# Patient Record
Sex: MALE | Race: WHITE | NOT HISPANIC OR LATINO | Employment: FULL TIME | URBAN - METROPOLITAN AREA
[De-identification: names, ages, dates, MRNs, and addresses within clinical notes are randomized per-mention and may not be internally consistent; named-entity substitution may affect disease eponyms.]

---

## 2019-05-29 ENCOUNTER — HOSPITAL ENCOUNTER (OUTPATIENT)
Facility: CLINIC | Age: 39
End: 2019-05-29
Attending: ORTHOPAEDIC SURGERY | Admitting: ORTHOPAEDIC SURGERY
Payer: COMMERCIAL

## 2019-07-09 RX ORDER — INSULIN GLARGINE 100 [IU]/ML
45-50 INJECTION, SOLUTION SUBCUTANEOUS DAILY
COMMUNITY
End: 2020-07-07

## 2019-07-09 RX ORDER — METFORMIN HCL 500 MG
2000 TABLET, EXTENDED RELEASE 24 HR ORAL AT BEDTIME
COMMUNITY

## 2019-07-09 RX ORDER — LISINOPRIL 20 MG/1
20 TABLET ORAL DAILY
COMMUNITY
End: 2021-03-20

## 2019-07-09 RX ORDER — CHLORAL HYDRATE 500 MG
2 CAPSULE ORAL 2 TIMES DAILY
COMMUNITY
End: 2021-03-20

## 2019-07-09 RX ORDER — IBUPROFEN 200 MG
800 TABLET ORAL EVERY 4 HOURS PRN
COMMUNITY
End: 2020-07-07

## 2019-07-09 RX ORDER — NAPROXEN 500 MG/1
500 TABLET ORAL 2 TIMES DAILY WITH MEALS
COMMUNITY
End: 2021-03-20

## 2019-07-11 ENCOUNTER — ANESTHESIA (OUTPATIENT)
Dept: SURGERY | Facility: CLINIC | Age: 39
End: 2019-07-11
Payer: COMMERCIAL

## 2019-07-11 ENCOUNTER — HOSPITAL ENCOUNTER (OUTPATIENT)
Facility: CLINIC | Age: 39
Discharge: HOME | End: 2019-07-11
Attending: ORTHOPAEDIC SURGERY | Admitting: ORTHOPAEDIC SURGERY
Payer: COMMERCIAL

## 2019-07-11 ENCOUNTER — ANESTHESIA EVENT (OUTPATIENT)
Dept: SURGERY | Facility: CLINIC | Age: 39
End: 2019-07-11
Payer: COMMERCIAL

## 2019-07-11 VITALS
HEART RATE: 85 BPM | HEIGHT: 71 IN | RESPIRATION RATE: 16 BRPM | DIASTOLIC BLOOD PRESSURE: 74 MMHG | WEIGHT: 281.1 LBS | BODY MASS INDEX: 39.35 KG/M2 | SYSTOLIC BLOOD PRESSURE: 111 MMHG | TEMPERATURE: 97 F | OXYGEN SATURATION: 92 %

## 2019-07-11 DIAGNOSIS — Z98.890 POSTOPERATIVE STATE: Primary | ICD-10-CM

## 2019-07-11 LAB
GLUCOSE BLDC GLUCOMTR-MCNC: 138 MG/DL (ref 70–99)
GLUCOSE BLDC GLUCOMTR-MCNC: 159 MG/DL (ref 70–99)

## 2019-07-11 PROCEDURE — 71000012 ZZH RECOVERY PHASE 1 LEVEL 1 FIRST HR: Performed by: ORTHOPAEDIC SURGERY

## 2019-07-11 PROCEDURE — 25800030 ZZH RX IP 258 OP 636: Performed by: NURSE ANESTHETIST, CERTIFIED REGISTERED

## 2019-07-11 PROCEDURE — 25000125 ZZHC RX 250: Performed by: NURSE ANESTHETIST, CERTIFIED REGISTERED

## 2019-07-11 PROCEDURE — 25000125 ZZHC RX 250: Performed by: ORTHOPAEDIC SURGERY

## 2019-07-11 PROCEDURE — 27210794 ZZH OR GENERAL SUPPLY STERILE: Performed by: ORTHOPAEDIC SURGERY

## 2019-07-11 PROCEDURE — 25000128 H RX IP 250 OP 636: Performed by: NURSE ANESTHETIST, CERTIFIED REGISTERED

## 2019-07-11 PROCEDURE — 25000128 H RX IP 250 OP 636: Performed by: ANESTHESIOLOGY

## 2019-07-11 PROCEDURE — 82962 GLUCOSE BLOOD TEST: CPT

## 2019-07-11 PROCEDURE — 36000056 ZZH SURGERY LEVEL 3 1ST 30 MIN: Performed by: ORTHOPAEDIC SURGERY

## 2019-07-11 PROCEDURE — 25000128 H RX IP 250 OP 636: Performed by: PHYSICIAN ASSISTANT

## 2019-07-11 PROCEDURE — 25800030 ZZH RX IP 258 OP 636: Performed by: ANESTHESIOLOGY

## 2019-07-11 PROCEDURE — 36000058 ZZH SURGERY LEVEL 3 EA 15 ADDTL MIN: Performed by: ORTHOPAEDIC SURGERY

## 2019-07-11 PROCEDURE — 37000008 ZZH ANESTHESIA TECHNICAL FEE, 1ST 30 MIN: Performed by: ORTHOPAEDIC SURGERY

## 2019-07-11 PROCEDURE — 40000170 ZZH STATISTIC PRE-PROCEDURE ASSESSMENT II: Performed by: ORTHOPAEDIC SURGERY

## 2019-07-11 PROCEDURE — C1713 ANCHOR/SCREW BN/BN,TIS/BN: HCPCS | Performed by: ORTHOPAEDIC SURGERY

## 2019-07-11 PROCEDURE — 37000009 ZZH ANESTHESIA TECHNICAL FEE, EACH ADDTL 15 MIN: Performed by: ORTHOPAEDIC SURGERY

## 2019-07-11 DEVICE — IMPLANTABLE DEVICE: Type: IMPLANTABLE DEVICE | Site: ELBOW | Status: FUNCTIONAL

## 2019-07-11 RX ORDER — ONDANSETRON 2 MG/ML
INJECTION INTRAMUSCULAR; INTRAVENOUS PRN
Status: DISCONTINUED | OUTPATIENT
Start: 2019-07-11 | End: 2019-07-11

## 2019-07-11 RX ORDER — HYDROMORPHONE HYDROCHLORIDE 1 MG/ML
.3-.5 INJECTION, SOLUTION INTRAMUSCULAR; INTRAVENOUS; SUBCUTANEOUS EVERY 10 MIN PRN
Status: DISCONTINUED | OUTPATIENT
Start: 2019-07-11 | End: 2019-07-11 | Stop reason: HOSPADM

## 2019-07-11 RX ORDER — ONDANSETRON 2 MG/ML
4 INJECTION INTRAMUSCULAR; INTRAVENOUS EVERY 30 MIN PRN
Status: DISCONTINUED | OUTPATIENT
Start: 2019-07-11 | End: 2019-07-11 | Stop reason: HOSPADM

## 2019-07-11 RX ORDER — FENTANYL CITRATE 50 UG/ML
25-50 INJECTION, SOLUTION INTRAMUSCULAR; INTRAVENOUS
Status: DISCONTINUED | OUTPATIENT
Start: 2019-07-11 | End: 2019-07-11 | Stop reason: HOSPADM

## 2019-07-11 RX ORDER — ROPIVACAINE HYDROCHLORIDE 5 MG/ML
INJECTION, SOLUTION EPIDURAL; INFILTRATION; PERINEURAL PRN
Status: DISCONTINUED | OUTPATIENT
Start: 2019-07-11 | End: 2019-07-11

## 2019-07-11 RX ORDER — PROPOFOL 10 MG/ML
INJECTION, EMULSION INTRAVENOUS PRN
Status: DISCONTINUED | OUTPATIENT
Start: 2019-07-11 | End: 2019-07-11

## 2019-07-11 RX ORDER — LIDOCAINE HYDROCHLORIDE AND EPINEPHRINE 10; 10 MG/ML; UG/ML
INJECTION, SOLUTION INFILTRATION; PERINEURAL PRN
Status: DISCONTINUED | OUTPATIENT
Start: 2019-07-11 | End: 2019-07-11 | Stop reason: HOSPADM

## 2019-07-11 RX ORDER — PROPOFOL 10 MG/ML
INJECTION, EMULSION INTRAVENOUS CONTINUOUS PRN
Status: DISCONTINUED | OUTPATIENT
Start: 2019-07-11 | End: 2019-07-11

## 2019-07-11 RX ORDER — ONDANSETRON 4 MG/1
4 TABLET, ORALLY DISINTEGRATING ORAL EVERY 30 MIN PRN
Status: DISCONTINUED | OUTPATIENT
Start: 2019-07-11 | End: 2019-07-11 | Stop reason: HOSPADM

## 2019-07-11 RX ORDER — FENTANYL CITRATE 50 UG/ML
100 INJECTION, SOLUTION INTRAMUSCULAR; INTRAVENOUS
Status: COMPLETED | OUTPATIENT
Start: 2019-07-11 | End: 2019-07-11

## 2019-07-11 RX ORDER — ACETAMINOPHEN 325 MG/1
650 TABLET ORAL EVERY 4 HOURS PRN
Qty: 50 TABLET | Refills: 0 | Status: SHIPPED | OUTPATIENT
Start: 2019-07-11 | End: 2020-07-07

## 2019-07-11 RX ORDER — ACETAMINOPHEN 325 MG/1
650 TABLET ORAL
Status: DISCONTINUED | OUTPATIENT
Start: 2019-07-11 | End: 2019-07-11 | Stop reason: HOSPADM

## 2019-07-11 RX ORDER — OXYCODONE HYDROCHLORIDE 5 MG/1
5 TABLET ORAL
Status: DISCONTINUED | OUTPATIENT
Start: 2019-07-11 | End: 2019-07-11 | Stop reason: HOSPADM

## 2019-07-11 RX ORDER — NALOXONE HYDROCHLORIDE 0.4 MG/ML
.1-.4 INJECTION, SOLUTION INTRAMUSCULAR; INTRAVENOUS; SUBCUTANEOUS
Status: DISCONTINUED | OUTPATIENT
Start: 2019-07-11 | End: 2019-07-11 | Stop reason: HOSPADM

## 2019-07-11 RX ORDER — OXYCODONE HYDROCHLORIDE 5 MG/1
5-10 TABLET ORAL EVERY 4 HOURS PRN
Qty: 20 TABLET | Refills: 0 | Status: SHIPPED | OUTPATIENT
Start: 2019-07-11

## 2019-07-11 RX ORDER — CEFAZOLIN SODIUM IN 0.9 % NACL 3 G/100 ML
3 INTRAVENOUS SOLUTION, PIGGYBACK (ML) INTRAVENOUS
Status: COMPLETED | OUTPATIENT
Start: 2019-07-11 | End: 2019-07-11

## 2019-07-11 RX ORDER — SODIUM CHLORIDE, SODIUM LACTATE, POTASSIUM CHLORIDE, CALCIUM CHLORIDE 600; 310; 30; 20 MG/100ML; MG/100ML; MG/100ML; MG/100ML
INJECTION, SOLUTION INTRAVENOUS CONTINUOUS
Status: DISCONTINUED | OUTPATIENT
Start: 2019-07-11 | End: 2019-07-11 | Stop reason: HOSPADM

## 2019-07-11 RX ORDER — CEFAZOLIN SODIUM 1 G/3ML
1 INJECTION, POWDER, FOR SOLUTION INTRAMUSCULAR; INTRAVENOUS SEE ADMIN INSTRUCTIONS
Status: DISCONTINUED | OUTPATIENT
Start: 2019-07-11 | End: 2019-07-11 | Stop reason: HOSPADM

## 2019-07-11 RX ORDER — LIDOCAINE HYDROCHLORIDE 20 MG/ML
INJECTION, SOLUTION INFILTRATION; PERINEURAL PRN
Status: DISCONTINUED | OUTPATIENT
Start: 2019-07-11 | End: 2019-07-11

## 2019-07-11 RX ORDER — MEPERIDINE HYDROCHLORIDE 25 MG/ML
12.5 INJECTION INTRAMUSCULAR; INTRAVENOUS; SUBCUTANEOUS
Status: DISCONTINUED | OUTPATIENT
Start: 2019-07-11 | End: 2019-07-11 | Stop reason: HOSPADM

## 2019-07-11 RX ORDER — AMOXICILLIN 250 MG
1-2 CAPSULE ORAL 2 TIMES DAILY
Qty: 30 TABLET | Refills: 0 | Status: SHIPPED | OUTPATIENT
Start: 2019-07-11 | End: 2021-03-20

## 2019-07-11 RX ADMIN — PROPOFOL 30 MG: 10 INJECTION, EMULSION INTRAVENOUS at 16:21

## 2019-07-11 RX ADMIN — SODIUM CHLORIDE, POTASSIUM CHLORIDE, SODIUM LACTATE AND CALCIUM CHLORIDE: 600; 310; 30; 20 INJECTION, SOLUTION INTRAVENOUS at 15:46

## 2019-07-11 RX ADMIN — PROPOFOL 20 MG: 10 INJECTION, EMULSION INTRAVENOUS at 15:55

## 2019-07-11 RX ADMIN — PROPOFOL 20 MG: 10 INJECTION, EMULSION INTRAVENOUS at 15:51

## 2019-07-11 RX ADMIN — PHENYLEPHRINE HYDROCHLORIDE 100 MCG: 10 INJECTION INTRAVENOUS at 16:39

## 2019-07-11 RX ADMIN — PHENYLEPHRINE HYDROCHLORIDE 100 MCG: 10 INJECTION INTRAVENOUS at 16:28

## 2019-07-11 RX ADMIN — DEXMEDETOMIDINE HYDROCHLORIDE 12 MCG: 100 INJECTION, SOLUTION INTRAVENOUS at 15:51

## 2019-07-11 RX ADMIN — BUPIVACAINE HYDROCHLORIDE 40 ML GIVEN: 5 INJECTION, SOLUTION EPIDURAL; INTRACAUDAL; PERINEURAL at 15:08

## 2019-07-11 RX ADMIN — PROPOFOL 20 MG: 10 INJECTION, EMULSION INTRAVENOUS at 16:02

## 2019-07-11 RX ADMIN — Medication 3 G: at 15:55

## 2019-07-11 RX ADMIN — PROPOFOL 20 MG: 10 INJECTION, EMULSION INTRAVENOUS at 16:05

## 2019-07-11 RX ADMIN — LIDOCAINE HYDROCHLORIDE 60 MG: 20 INJECTION, SOLUTION INFILTRATION; PERINEURAL at 15:50

## 2019-07-11 RX ADMIN — PROPOFOL 20 MG: 10 INJECTION, EMULSION INTRAVENOUS at 15:59

## 2019-07-11 RX ADMIN — PROPOFOL 30 MG: 10 INJECTION, EMULSION INTRAVENOUS at 16:10

## 2019-07-11 RX ADMIN — ROPIVACAINE HYDROCHLORIDE 5 ML: 5 INJECTION, SOLUTION EPIDURAL; INFILTRATION; PERINEURAL at 15:08

## 2019-07-11 RX ADMIN — FENTANYL CITRATE 100 MCG: 50 INJECTION INTRAMUSCULAR; INTRAVENOUS at 15:18

## 2019-07-11 RX ADMIN — PROPOFOL 100 MCG/KG/MIN: 10 INJECTION, EMULSION INTRAVENOUS at 15:48

## 2019-07-11 RX ADMIN — MIDAZOLAM HYDROCHLORIDE 2 MG: 1 INJECTION, SOLUTION INTRAMUSCULAR; INTRAVENOUS at 15:18

## 2019-07-11 RX ADMIN — PROPOFOL 30 MG: 10 INJECTION, EMULSION INTRAVENOUS at 16:08

## 2019-07-11 RX ADMIN — DEXMEDETOMIDINE HYDROCHLORIDE 8 MCG: 100 INJECTION, SOLUTION INTRAVENOUS at 15:55

## 2019-07-11 RX ADMIN — ONDANSETRON 4 MG: 2 INJECTION INTRAMUSCULAR; INTRAVENOUS at 16:44

## 2019-07-11 RX ADMIN — DEXMEDETOMIDINE HYDROCHLORIDE 0.7 MCG/KG/HR: 100 INJECTION, SOLUTION INTRAVENOUS at 15:48

## 2019-07-11 RX ADMIN — PHENYLEPHRINE HYDROCHLORIDE 100 MCG: 10 INJECTION INTRAVENOUS at 16:18

## 2019-07-11 RX ADMIN — PROPOFOL 20 MG: 10 INJECTION, EMULSION INTRAVENOUS at 15:57

## 2019-07-11 RX ADMIN — PHENYLEPHRINE HYDROCHLORIDE 100 MCG: 10 INJECTION INTRAVENOUS at 16:20

## 2019-07-11 RX ADMIN — PHENYLEPHRINE HYDROCHLORIDE 50 MCG: 10 INJECTION INTRAVENOUS at 16:15

## 2019-07-11 ASSESSMENT — LIFESTYLE VARIABLES: TOBACCO_USE: 1

## 2019-07-11 ASSESSMENT — MIFFLIN-ST. JEOR: SCORE: 2227.19

## 2019-07-11 NOTE — ANESTHESIA CARE TRANSFER NOTE
Patient: Ozzie Olmedo    Procedure(s):  RIGHT ELBOW LATERAL COLLATERAL LIGAMENT REPAIR, RIGHT ELBOW EXTENSOR SUPINATOR MASS REPAIR    Diagnosis: right elbow lateral collateral ligament rupture and extenson supernator mass tear  Diagnosis Additional Information: No value filed.    Anesthesia Type:   General, ETT, Periph. Nerve Block for postop pain     Note:  Airway :Face Mask  Patient transferred to:PACU  Comments: At end of procedure, spontaneous respirations, patient alert to voice, able to follow commands. Oxygen via facemask at 8 liters per minute to PACU. Oxygen tubing connected to wall O2 in PACU, SpO2, NiBP, and EKG monitors and alarms on and functioning, Flynn Hugger warmer connected to patient gown, report on patient's clinical status given to PACU RN, RN questions answered.Handoff Report: Identifed the Patient, Identified the Reponsible Provider, Reviewed the pertinent medical history, Discussed the surgical course, Reviewed Intra-OP anesthesia mangement and issues during anesthesia, Set expectations for post-procedure period and Allowed opportunity for questions and acknowledgement of understanding      Vitals: (Last set prior to Anesthesia Care Transfer)    CRNA VITALS  7/11/2019 1624 - 7/11/2019 1657      7/11/2019             Resp Rate (set):  10                Electronically Signed By: RUSSELL Van CRNA  July 11, 2019  4:57 PM

## 2019-07-11 NOTE — ANESTHESIA POSTPROCEDURE EVALUATION
Patient: Ozzie Olmedo    Procedure(s):  RIGHT ELBOW LATERAL COLLATERAL LIGAMENT REPAIR, RIGHT ELBOW EXTENSOR SUPINATOR MASS REPAIR    Diagnosis:right elbow lateral collateral ligament rupture and extenson supernator mass tear  Diagnosis Additional Information: No value filed.    Anesthesia Type:  General, ETT, Periph. Nerve Block for postop pain    Note:  Anesthesia Post Evaluation    Patient location during evaluation: PACU  Patient participation: Able to fully participate in evaluation  Level of consciousness: awake and awake and alert  Pain management: adequate  Airway patency: patent  Cardiovascular status: acceptable  Respiratory status: acceptable  Hydration status: acceptable  PONV: none     Anesthetic complications: None          Last vitals:  Vitals:    07/11/19 1715 07/11/19 1720 07/11/19 1730   BP: (!) 84/55 97/64 111/74   Pulse:  89 85   Resp: 22 13 16   Temp: 36.3  C (97.3  F) 36.2  C (97.2  F) 36.1  C (97  F)   SpO2: 95% 96% 92%         Electronically Signed By: Edilia Aguilar  July 11, 2019  6:57 PM

## 2019-07-11 NOTE — DISCHARGE INSTRUCTIONS
Same Day Surgery Discharge Instructions for  Sedation and General Anesthesia       It's not unusual to feel dizzy, light-headed or faint for up to 24 hours after surgery or while taking pain medication.  If you have these symptoms: sit for a few minutes before standing and have someone assist you when you get up to walk or use the bathroom.      You should rest and relax for the next 24 hours. We recommend you make arrangements to have an adult stay with you for at least 24 hours after your discharge.  Avoid hazardous and strenuous activity.      DO NOT DRIVE any vehicle or operate mechanical equipment for 24 hours following the end of your surgery.  Even though you may feel normal, your reactions may be affected by the medication you have received.      Do not drink alcoholic beverages for 24 hours following surgery.       Slowly progress to your regular diet as you feel able. It's not unusual to feel nauseated and/or vomit after receiving anesthesia.  If you develop these symptoms, drink clear liquids (apple juice, ginger ale, broth, 7-up, etc. ) until you feel better.  If your nausea and vomiting persists for 24 hours, please notify your surgeon.        All narcotic pain medications, along with inactivity and anesthesia, can cause constipation. Drinking plenty of liquids and increasing fiber intake will help.      For any questions of a medical nature, call your surgeon.      Do not make important decisions for 24 hours.      If you had general anesthesia, you may have a sore throat for a couple of days related to the breathing tube used during surgery.  You may use Cepacol lozenges to help with this discomfort.  If it worsens or if you develop a fever, contact your surgeon.       If you feel your pain is not well managed with the pain medications prescribed by your surgeon, please contact your surgeon's office to let them know so they can address your concerns.           General:    Elevate extremity to prevent  swelling.  For 2-3 days post-operatively elevate the extremity above the level of your heart to reduce swelling.    Apply ice to the extremity 3-4 times a day (20 minutes on/20 minutes off) for 3-4 days or until the swelling subsides.  If you are in a splint, apply ice on the top of the splint .    If you are experiencing any of the following call our office or the doctor on call immediately.      Fever greater than 101 degrees Fahrenheit, chills    Excessive redness, swelling, or drainage from incisions    Bruising and discoloration is common following surgery.    Medications:    As a reminder, do not wait until you are almost out of medication to contact the clinic for a refill.  Additionally, narcotic prescriptions cannot be faxed or electronically sent to your pharmacy, these must be picked up from the clinic.     You will be discharged with a prescription for pain medication.  Let us know in advance if you are running low.  Refills are NOT handled on the weekend or after hours.      You may use Tylenol instead of the pain medication for milder pain, or you can supplement the prescribed pain medication with Ibuprofen 800 mg every 8 hours.    Pain medication can cause constipation.  You may use an over-the-counter laxative or stool softener (Colace, Metamucil, etc.)  As needed until your bowel function returns to normal.      You may resume your preoperative medications.    Wound Care:    Keep your incision dry in the shower.  You may bathe and keep your extremity out of water or shower.  If you shower, protect it with a plastic bag.  Place bag over cast and tape it closed, cover the tape with a towel, and cover the first bag with a towel or another bag and tape it closed above the towel.        DO NOT change your dressing.  This will be done at the office or you will have specific orders on your discharge paperwork you receive after surgery.  Keep your cast or dressing dry.       Do not use any ointments or  lotions on your incision.      Follow Up appointment:  Please make sure your post-op appointment is made according to your discharge orders from surgery.  If you have questions please call my office.    Joseph Fernandez MD  [phone]

## 2019-07-11 NOTE — BRIEF OP NOTE
[facility]    Brief Operative Note    Pre-operative diagnosis: right elbow lateral collateral ligament rupture and extenson supernator mass tear  Post-operative diagnosis Same  Procedure: Procedure(s):  RIGHT ELBOW LATERAL COLLATERAL LIGAMENT REPAIR, RIGHT ELBOW EXTENSOR SUPINATOR MASS REPAIR  Surgeon: Surgeon(s) and Role:     * Joseph Fernandez MD - Primary     * Светлана Rothman PA-C - Assisting  Anesthesia: Combined MAC with Interscalene Block   Estimated blood loss: Minimal  Drains: None  Specimens: * No specimens in log *  Findings:   None.  Complications: None.  Implants:    Implant Name Type Inv. Item Serial No.  Lot No. LRB No. Used   IMP JUGGERKNOT SOFT ANCHOR SHORT RIGID SIZE 2 1.45MM    BIOMET [id number] Right 1   IMP JUGGERKNOT SOFT ANCHOR SHORT RIGID SIZE 1 1.45MM    BIOMET [id number] Right 1
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
## 2019-07-11 NOTE — ANESTHESIA PROCEDURE NOTES
Peripheral nerve/Neuraxial procedure note : Brachial plexus (Axillary approach to the brachial plexus)  Pre-Procedure  Performed by Malcolm Alicia DO  Location: pre-op    Procedure Times:7/11/2019 3:01 PM and 7/11/2019 3:19 PM  Pre-Anesthestic Checklist: patient identified, IV checked, site marked, risks and benefits discussed, informed consent, monitors and equipment checked, pre-op evaluation, at physician/surgeon's request and post-op pain management    Timeout  Correct Patient: Yes   Correct Procedure: Yes   Correct Site: Yes   Correct Laterality: Yes   Correct Position: Yes   Site Marked: Yes   .   Procedure Documentation    .    Procedure:    Brachial plexus (Axillary approach to the brachial plexus).  Local skin infiltrated with 3 mL of 1% lidocaine.     Ultrasound used to identify targeted nerve, plexus, or vascular marker and placed a needle adjacent to it., Ultrasound was used to visualize the spread of the anesthetic in close proximity to the above stated nerve. A permanent image is entered into the patient's record.  Patient Prep;mask, sterile gloves, chlorhexidine gluconate and isopropyl alcohol, patient draped.  .  Needle: insulated, short bevel Needle Gauge: 21.    Needle Length (Inches) 4  Insertion Method: Single Shot.       Assessment/Narrative  Paresthesias: No.  .  The placement was negative for: blood aspirated, painful injection and site bleeding.  Bolus given via needle..   Secured via.   Complications: none. Test dose of mL at. Test dose negative for signs of intravascular, subdural or intrathecal injection. Comments:  The surgeon has given a verbal order transferring care of this patient to me for the performance of a regional analgesia block for post-op pain control. It is requested of me because I am uniquely trained and qualified to perform this block and the surgeon is neither trained nor qualified to perform this procedure    15:01-15:19

## 2019-07-12 NOTE — OP NOTE
Procedure Date: 07/11/2019      PREOPERATIVE DIAGNOSIS:     1.  Right elbow lateral collateral ligament rupture proximally.   2.  Right elbow lateral extensor supinator mass rupture.      PROCEDURE:     1.  Right elbow lateral collateral ligament repair.   2.  Right elbow lateral extensor supinator mass repair.        FIRST ASSISTANT:  SOHAIL Westfall.      INDICATION:  Persistent instability, pain, status post traumatic injury that has progressively gotten worse, decreased function and mobility with MRI showing a complete disruption of the left lateral side.      COMPLICATIONS:  None.      IMPLANTS:  There was 1.45 mm Juggernaught suture anchors x 2.      SPECIMENS:  None.      ANTIBIOTICS:  Ancef.      ANESTHESIA:  Regional block plus sedation.      BLOOD LOSS:  Less than 5 mL.      Consent was for right elbow lateral collateral ligament repair, lateral extensor supinator mass repair.  Risk factors have been discussed, infection, bleeding, nerve damage, heart attack, stroke, death, blood clots,  no relief of symptoms, worsening symptoms, or revision surgery.  Consent was signed voluntarily.  Surgical site has marked and a time out occurred for surgery.      DESCRIPTION OF PROCEDURE:  The patient was properly identified in preop and after a sterile prep and drape,  local anesthetic was injected into the field.  The tourniquet was inflated, given through a modified Kocher incision.  The joint was entered.  A significant amount of synovial fluid was encountered.  Prior to that he had palpable joint subluxation now it is reduced with elevation and a translation.  A drain was entered.  He had disruption, complete lateral collateral ligament and extensor mass.  The pseudotendon and fibers of the tear was debrided sharply with a #15 blade and then back down to healthy tissues.  The joint was evacuated of all the excess synovial fluid.  A rongeur was used to get bleeding bone.  The lateral extensor supinator mass was  debrided of fibrous material.      Wounds were irrigated and then Juggernaught 1.45 mm suture anchors were placed x 2  modified horizontal mattress was placed.  The lateral collateral ligament and supinator extensor mass was placed back to its home position.  The elbow is much more stable to varus stress and the joint was reduced.  The subluxation was corrected, 3-0 Vicryl was used to bury the nonabsorbable sutures within the mass.  Hemostasis achieved.  The wounds were irrigated, further and closed with 3-0 Vicryl and 4-0 nylon.  All sponge and instrument counts were correct.  Sterile dressings were then placed in a splint.         EDWIN GONZALEZ MD             D: 2019   T: 2019   MT: MINA      Name:     NIGEL HILARIO   MRN:      [id number]        Account:        [id number]   :      1980           Procedure Date: 2019      Document: [id number]

## 2019-07-22 NOTE — ANESTHESIA POSTPROCEDURE EVALUATION
Patient: Ozzie Olmedo    Procedure(s):  RIGHT ELBOW LATERAL COLLATERAL LIGAMENT REPAIR, RIGHT ELBOW EXTENSOR SUPINATOR MASS REPAIR    Diagnosis:right elbow lateral collateral ligament rupture and extenson supernator mass tear  Diagnosis Additional Information: No value filed.    Anesthesia Type:  Peripheral nerve block    Note:  Anesthesia Post Evaluation    Last vitals:  Vitals:    07/11/19 1715 07/11/19 1720 07/11/19 1730   BP: (!) 84/55 97/64 111/74   Pulse:  89 85   Resp: 22 13 16   Temp: 36.3  C (97.3  F) 36.2  C (97.2  F) 36.1  C (97  F)   SpO2: 95% 96% 92%         Electronically Signed By: Edilia Aguilar  July 22, 2019  8:17 AM

## 2019-07-22 NOTE — ADDENDUM NOTE
Addendum  created 07/22/19 0829 by Edilia Aguilar    Intraprocedure Event edited, Sign clinical note

## 2020-07-07 ENCOUNTER — HOSPITAL ENCOUNTER (INPATIENT)
Facility: CLINIC | Age: 40
LOS: 2 days | Discharge: HOME | DRG: 440 | End: 2020-07-09
Attending: EMERGENCY MEDICINE | Admitting: HOSPITALIST
Payer: COMMERCIAL

## 2020-07-07 ENCOUNTER — APPOINTMENT (OUTPATIENT)
Dept: CT IMAGING | Facility: CLINIC | Age: 40
DRG: 440 | End: 2020-07-07
Attending: EMERGENCY MEDICINE
Payer: COMMERCIAL

## 2020-07-07 DIAGNOSIS — K85.90 ACUTE PANCREATITIS, UNSPECIFIED COMPLICATION STATUS, UNSPECIFIED PANCREATITIS TYPE: ICD-10-CM

## 2020-07-07 LAB
ALBUMIN SERPL-MCNC: 3.9 G/DL (ref 3.4–5)
ALBUMIN UR-MCNC: NEGATIVE MG/DL
ALP SERPL-CCNC: 92 U/L (ref 40–150)
ALT SERPL W P-5'-P-CCNC: 44 U/L (ref 0–70)
ANION GAP SERPL CALCULATED.3IONS-SCNC: 7 MMOL/L (ref 3–14)
APPEARANCE UR: CLEAR
AST SERPL W P-5'-P-CCNC: 21 U/L (ref 0–45)
BASE EXCESS BLDV CALC-SCNC: 6.8 MMOL/L
BASOPHILS # BLD AUTO: 0 10E9/L (ref 0–0.2)
BASOPHILS NFR BLD AUTO: 0.2 %
BILIRUB SERPL-MCNC: 0.9 MG/DL (ref 0.2–1.3)
BILIRUB UR QL STRIP: NEGATIVE
BUN SERPL-MCNC: 14 MG/DL (ref 7–30)
CALCIUM SERPL-MCNC: 9 MG/DL (ref 8.5–10.1)
CHLORIDE SERPL-SCNC: 94 MMOL/L (ref 94–109)
CO2 SERPL-SCNC: 30 MMOL/L (ref 20–32)
COLOR UR AUTO: ABNORMAL
CREAT BLD-MCNC: 1 MG/DL (ref 0.66–1.25)
CREAT SERPL-MCNC: 0.91 MG/DL (ref 0.66–1.25)
DIFFERENTIAL METHOD BLD: ABNORMAL
EOSINOPHIL # BLD AUTO: 0.2 10E9/L (ref 0–0.7)
EOSINOPHIL NFR BLD AUTO: 1.3 %
ERYTHROCYTE [DISTWIDTH] IN BLOOD BY AUTOMATED COUNT: 11.9 % (ref 10–15)
GFR SERPL CREATININE-BSD FRML MDRD: 84 ML/MIN/{1.73_M2}
GFR SERPL CREATININE-BSD FRML MDRD: >90 ML/MIN/{1.73_M2}
GLUCOSE BLDC GLUCOMTR-MCNC: 135 MG/DL (ref 70–99)
GLUCOSE BLDC GLUCOMTR-MCNC: 135 MG/DL (ref 70–99)
GLUCOSE BLDC GLUCOMTR-MCNC: 163 MG/DL (ref 70–99)
GLUCOSE BLDC GLUCOMTR-MCNC: 182 MG/DL (ref 70–99)
GLUCOSE SERPL-MCNC: 172 MG/DL (ref 70–99)
GLUCOSE UR STRIP-MCNC: NEGATIVE MG/DL
HBA1C MFR BLD: 8.1 % (ref 0–5.6)
HBA1C MFR BLD: 8.3 % (ref 0–5.6)
HCO3 BLDV-SCNC: 32 MMOL/L (ref 21–28)
HCT VFR BLD AUTO: 45.2 % (ref 40–53)
HGB BLD-MCNC: 15.6 G/DL (ref 13.3–17.7)
HGB UR QL STRIP: NEGATIVE
IMM GRANULOCYTES # BLD: 0.1 10E9/L (ref 0–0.4)
IMM GRANULOCYTES NFR BLD: 0.5 %
INTERPRETATION ECG - MUSE: NORMAL
KETONES BLD-SCNC: 0.3 MMOL/L (ref 0–0.6)
KETONES UR STRIP-MCNC: NEGATIVE MG/DL
LACTATE BLD-SCNC: 0.9 MMOL/L (ref 0.7–2)
LEUKOCYTE ESTERASE UR QL STRIP: NEGATIVE
LIPASE SERPL-CCNC: 1145 U/L (ref 73–393)
LYMPHOCYTES # BLD AUTO: 1.7 10E9/L (ref 0.8–5.3)
LYMPHOCYTES NFR BLD AUTO: 10.2 %
MAGNESIUM SERPL-MCNC: 1.8 MG/DL (ref 1.6–2.3)
MCH RBC QN AUTO: 32.4 PG (ref 26.5–33)
MCHC RBC AUTO-ENTMCNC: 34.5 G/DL (ref 31.5–36.5)
MCV RBC AUTO: 94 FL (ref 78–100)
MONOCYTES # BLD AUTO: 1 10E9/L (ref 0–1.3)
MONOCYTES NFR BLD AUTO: 5.9 %
NEUTROPHILS # BLD AUTO: 13.3 10E9/L (ref 1.6–8.3)
NEUTROPHILS NFR BLD AUTO: 81.9 %
NITRATE UR QL: NEGATIVE
NRBC # BLD AUTO: 0 10*3/UL
NRBC BLD AUTO-RTO: 0 /100
O2/TOTAL GAS SETTING VFR VENT: ABNORMAL %
OXYHGB MFR BLDV: 83 %
PCO2 BLDV: 45 MM HG (ref 40–50)
PH BLDV: 7.46 PH (ref 7.32–7.43)
PH UR STRIP: 7.5 PH (ref 5–7)
PLATELET # BLD AUTO: 232 10E9/L (ref 150–450)
PO2 BLDV: 49 MM HG (ref 25–47)
POTASSIUM SERPL-SCNC: 3.6 MMOL/L (ref 3.4–5.3)
PROT SERPL-MCNC: 7 G/DL (ref 6.8–8.8)
RBC # BLD AUTO: 4.82 10E12/L (ref 4.4–5.9)
RBC #/AREA URNS AUTO: <1 /HPF (ref 0–2)
SODIUM SERPL-SCNC: 131 MMOL/L (ref 133–144)
SOURCE: ABNORMAL
SP GR UR STRIP: 1.02 (ref 1–1.03)
SQUAMOUS #/AREA URNS AUTO: <1 /HPF (ref 0–1)
TRIGL SERPL-MCNC: 131 MG/DL
TRIGL SERPL-MCNC: 172 MG/DL
TROPONIN I SERPL-MCNC: <0.015 UG/L (ref 0–0.04)
UROBILINOGEN UR STRIP-MCNC: NORMAL MG/DL (ref 0–2)
WBC # BLD AUTO: 16.2 10E9/L (ref 4–11)
WBC #/AREA URNS AUTO: 0 /HPF (ref 0–5)

## 2020-07-07 PROCEDURE — 80053 COMPREHEN METABOLIC PANEL: CPT | Performed by: EMERGENCY MEDICINE

## 2020-07-07 PROCEDURE — 25000132 ZZH RX MED GY IP 250 OP 250 PS 637: Performed by: HOSPITALIST

## 2020-07-07 PROCEDURE — 81001 URINALYSIS AUTO W/SCOPE: CPT | Performed by: EMERGENCY MEDICINE

## 2020-07-07 PROCEDURE — 83735 ASSAY OF MAGNESIUM: CPT | Performed by: EMERGENCY MEDICINE

## 2020-07-07 PROCEDURE — 36415 COLL VENOUS BLD VENIPUNCTURE: CPT | Performed by: PHYSICIAN ASSISTANT

## 2020-07-07 PROCEDURE — 25000128 H RX IP 250 OP 636: Performed by: EMERGENCY MEDICINE

## 2020-07-07 PROCEDURE — 83605 ASSAY OF LACTIC ACID: CPT | Performed by: HOSPITALIST

## 2020-07-07 PROCEDURE — 99207 ZZC CDG-CHARGE REQUIRED MANUAL ENTRY: CPT | Performed by: HOSPITALIST

## 2020-07-07 PROCEDURE — 83036 HEMOGLOBIN GLYCOSYLATED A1C: CPT | Performed by: PHYSICIAN ASSISTANT

## 2020-07-07 PROCEDURE — 96375 TX/PRO/DX INJ NEW DRUG ADDON: CPT

## 2020-07-07 PROCEDURE — 82565 ASSAY OF CREATININE: CPT

## 2020-07-07 PROCEDURE — 99285 EMERGENCY DEPT VISIT HI MDM: CPT | Mod: 25

## 2020-07-07 PROCEDURE — 93005 ELECTROCARDIOGRAM TRACING: CPT

## 2020-07-07 PROCEDURE — 00000146 ZZHCL STATISTIC GLUCOSE BY METER IP

## 2020-07-07 PROCEDURE — 74177 CT ABD & PELVIS W/CONTRAST: CPT

## 2020-07-07 PROCEDURE — 96374 THER/PROPH/DIAG INJ IV PUSH: CPT

## 2020-07-07 PROCEDURE — 99207 ZZC APP CREDIT; MD BILLING SHARED VISIT: CPT | Performed by: PHYSICIAN ASSISTANT

## 2020-07-07 PROCEDURE — U0003 INFECTIOUS AGENT DETECTION BY NUCLEIC ACID (DNA OR RNA); SEVERE ACUTE RESPIRATORY SYNDROME CORONAVIRUS 2 (SARS-COV-2) (CORONAVIRUS DISEASE [COVID-19]), AMPLIFIED PROBE TECHNIQUE, MAKING USE OF HIGH THROUGHPUT TECHNOLOGIES AS DESCRIBED BY CMS-2020-01-R: HCPCS | Performed by: EMERGENCY MEDICINE

## 2020-07-07 PROCEDURE — 25800030 ZZH RX IP 258 OP 636: Performed by: EMERGENCY MEDICINE

## 2020-07-07 PROCEDURE — 83605 ASSAY OF LACTIC ACID: CPT | Performed by: PHYSICIAN ASSISTANT

## 2020-07-07 PROCEDURE — 82805 BLOOD GASES W/O2 SATURATION: CPT | Performed by: EMERGENCY MEDICINE

## 2020-07-07 PROCEDURE — 84478 ASSAY OF TRIGLYCERIDES: CPT | Performed by: EMERGENCY MEDICINE

## 2020-07-07 PROCEDURE — 96376 TX/PRO/DX INJ SAME DRUG ADON: CPT

## 2020-07-07 PROCEDURE — 25000131 ZZH RX MED GY IP 250 OP 636 PS 637: Performed by: PHYSICIAN ASSISTANT

## 2020-07-07 PROCEDURE — 12000000 ZZH R&B MED SURG/OB

## 2020-07-07 PROCEDURE — 25000132 ZZH RX MED GY IP 250 OP 250 PS 637: Performed by: INTERNAL MEDICINE

## 2020-07-07 PROCEDURE — C9803 HOPD COVID-19 SPEC COLLECT: HCPCS

## 2020-07-07 PROCEDURE — 99223 1ST HOSP IP/OBS HIGH 75: CPT | Mod: AI | Performed by: HOSPITALIST

## 2020-07-07 PROCEDURE — 82010 KETONE BODYS QUAN: CPT | Performed by: EMERGENCY MEDICINE

## 2020-07-07 PROCEDURE — 25000125 ZZHC RX 250: Performed by: PHYSICIAN ASSISTANT

## 2020-07-07 PROCEDURE — 25000132 ZZH RX MED GY IP 250 OP 250 PS 637: Performed by: PHYSICIAN ASSISTANT

## 2020-07-07 PROCEDURE — 85025 COMPLETE CBC W/AUTO DIFF WBC: CPT | Performed by: EMERGENCY MEDICINE

## 2020-07-07 PROCEDURE — 25000125 ZZHC RX 250: Performed by: EMERGENCY MEDICINE

## 2020-07-07 PROCEDURE — 25800030 ZZH RX IP 258 OP 636: Performed by: HOSPITALIST

## 2020-07-07 PROCEDURE — 83036 HEMOGLOBIN GLYCOSYLATED A1C: CPT | Performed by: EMERGENCY MEDICINE

## 2020-07-07 PROCEDURE — 25000128 H RX IP 250 OP 636: Performed by: PHYSICIAN ASSISTANT

## 2020-07-07 PROCEDURE — 83690 ASSAY OF LIPASE: CPT | Performed by: EMERGENCY MEDICINE

## 2020-07-07 PROCEDURE — 25800030 ZZH RX IP 258 OP 636: Performed by: PHYSICIAN ASSISTANT

## 2020-07-07 PROCEDURE — 84484 ASSAY OF TROPONIN QUANT: CPT | Performed by: EMERGENCY MEDICINE

## 2020-07-07 PROCEDURE — 84478 ASSAY OF TRIGLYCERIDES: CPT | Performed by: PHYSICIAN ASSISTANT

## 2020-07-07 RX ORDER — ONDANSETRON 2 MG/ML
4 INJECTION INTRAMUSCULAR; INTRAVENOUS ONCE
Status: COMPLETED | OUTPATIENT
Start: 2020-07-07 | End: 2020-07-07

## 2020-07-07 RX ORDER — LORAZEPAM 1 MG/1
1-2 TABLET ORAL EVERY 30 MIN PRN
Status: DISCONTINUED | OUTPATIENT
Start: 2020-07-07 | End: 2020-07-08

## 2020-07-07 RX ORDER — ACETAMINOPHEN 325 MG/1
650 TABLET ORAL EVERY 4 HOURS PRN
Status: DISCONTINUED | OUTPATIENT
Start: 2020-07-07 | End: 2020-07-09 | Stop reason: HOSPADM

## 2020-07-07 RX ORDER — DEXTROSE MONOHYDRATE 25 G/50ML
25-50 INJECTION, SOLUTION INTRAVENOUS
Status: DISCONTINUED | OUTPATIENT
Start: 2020-07-07 | End: 2020-07-09 | Stop reason: HOSPADM

## 2020-07-07 RX ORDER — KETOROLAC TROMETHAMINE 15 MG/ML
15 INJECTION, SOLUTION INTRAMUSCULAR; INTRAVENOUS ONCE
Status: COMPLETED | OUTPATIENT
Start: 2020-07-07 | End: 2020-07-07

## 2020-07-07 RX ORDER — LISINOPRIL 40 MG/1
40 TABLET ORAL DAILY
Status: DISCONTINUED | OUTPATIENT
Start: 2020-07-08 | End: 2020-07-09 | Stop reason: HOSPADM

## 2020-07-07 RX ORDER — HYDROMORPHONE HYDROCHLORIDE 1 MG/ML
.3-.5 INJECTION, SOLUTION INTRAMUSCULAR; INTRAVENOUS; SUBCUTANEOUS
Status: DISCONTINUED | OUTPATIENT
Start: 2020-07-07 | End: 2020-07-08

## 2020-07-07 RX ORDER — LISINOPRIL AND HYDROCHLOROTHIAZIDE 20; 25 MG/1; MG/1
1 TABLET ORAL DAILY
COMMUNITY
End: 2021-03-20

## 2020-07-07 RX ORDER — HYDROMORPHONE HYDROCHLORIDE 1 MG/ML
0.5 INJECTION, SOLUTION INTRAMUSCULAR; INTRAVENOUS; SUBCUTANEOUS ONCE
Status: COMPLETED | OUTPATIENT
Start: 2020-07-07 | End: 2020-07-07

## 2020-07-07 RX ORDER — LIDOCAINE 40 MG/G
CREAM TOPICAL
Status: DISCONTINUED | OUTPATIENT
Start: 2020-07-07 | End: 2020-07-09 | Stop reason: HOSPADM

## 2020-07-07 RX ORDER — MAGNESIUM SULFATE HEPTAHYDRATE 40 MG/ML
4 INJECTION, SOLUTION INTRAVENOUS EVERY 4 HOURS PRN
Status: DISCONTINUED | OUTPATIENT
Start: 2020-07-07 | End: 2020-07-09 | Stop reason: HOSPADM

## 2020-07-07 RX ORDER — ONDANSETRON 2 MG/ML
4 INJECTION INTRAMUSCULAR; INTRAVENOUS EVERY 6 HOURS PRN
Status: DISCONTINUED | OUTPATIENT
Start: 2020-07-07 | End: 2020-07-09 | Stop reason: HOSPADM

## 2020-07-07 RX ORDER — POTASSIUM CHLORIDE 7.45 MG/ML
10 INJECTION INTRAVENOUS
Status: DISCONTINUED | OUTPATIENT
Start: 2020-07-07 | End: 2020-07-09 | Stop reason: HOSPADM

## 2020-07-07 RX ORDER — CYCLOBENZAPRINE HCL 10 MG
10 TABLET ORAL 3 TIMES DAILY PRN
COMMUNITY
End: 2020-07-07

## 2020-07-07 RX ORDER — LANOLIN ALCOHOL/MO/W.PET/CERES
100 CREAM (GRAM) TOPICAL DAILY
Status: DISCONTINUED | OUTPATIENT
Start: 2020-07-08 | End: 2020-07-09 | Stop reason: HOSPADM

## 2020-07-07 RX ORDER — POTASSIUM CHLORIDE 29.8 MG/ML
20 INJECTION INTRAVENOUS
Status: DISCONTINUED | OUTPATIENT
Start: 2020-07-07 | End: 2020-07-09 | Stop reason: HOSPADM

## 2020-07-07 RX ORDER — IOPAMIDOL 755 MG/ML
500 INJECTION, SOLUTION INTRAVASCULAR ONCE
Status: COMPLETED | OUTPATIENT
Start: 2020-07-07 | End: 2020-07-07

## 2020-07-07 RX ORDER — POTASSIUM CHLORIDE 1.5 G/1.58G
20-40 POWDER, FOR SOLUTION ORAL
Status: DISCONTINUED | OUTPATIENT
Start: 2020-07-07 | End: 2020-07-09 | Stop reason: HOSPADM

## 2020-07-07 RX ORDER — HYDROXYZINE HYDROCHLORIDE 25 MG/1
25 TABLET, FILM COATED ORAL EVERY 6 HOURS PRN
Status: DISCONTINUED | OUTPATIENT
Start: 2020-07-07 | End: 2020-07-09 | Stop reason: HOSPADM

## 2020-07-07 RX ORDER — CYCLOBENZAPRINE HCL 5 MG
10 TABLET ORAL 3 TIMES DAILY
Status: DISCONTINUED | OUTPATIENT
Start: 2020-07-07 | End: 2020-07-09 | Stop reason: HOSPADM

## 2020-07-07 RX ORDER — SODIUM CHLORIDE, SODIUM LACTATE, POTASSIUM CHLORIDE, CALCIUM CHLORIDE 600; 310; 30; 20 MG/100ML; MG/100ML; MG/100ML; MG/100ML
INJECTION, SOLUTION INTRAVENOUS CONTINUOUS
Status: DISCONTINUED | OUTPATIENT
Start: 2020-07-07 | End: 2020-07-09 | Stop reason: HOSPADM

## 2020-07-07 RX ORDER — ONDANSETRON 4 MG/1
4 TABLET, ORALLY DISINTEGRATING ORAL EVERY 6 HOURS PRN
Status: DISCONTINUED | OUTPATIENT
Start: 2020-07-07 | End: 2020-07-09 | Stop reason: HOSPADM

## 2020-07-07 RX ORDER — SEMAGLUTIDE 1.34 MG/ML
0.25 INJECTION, SOLUTION SUBCUTANEOUS
Status: ON HOLD | COMMUNITY
End: 2020-07-09

## 2020-07-07 RX ORDER — OXYCODONE HYDROCHLORIDE 5 MG/1
10 TABLET ORAL EVERY 4 HOURS PRN
Status: DISCONTINUED | OUTPATIENT
Start: 2020-07-07 | End: 2020-07-07

## 2020-07-07 RX ORDER — OXYCODONE HYDROCHLORIDE 5 MG/1
5-10 TABLET ORAL
Status: DISCONTINUED | OUTPATIENT
Start: 2020-07-07 | End: 2020-07-07

## 2020-07-07 RX ORDER — CYCLOBENZAPRINE HCL 10 MG
10 TABLET ORAL 3 TIMES DAILY PRN
COMMUNITY
End: 2021-03-20

## 2020-07-07 RX ORDER — POTASSIUM CHLORIDE 1500 MG/1
20-40 TABLET, EXTENDED RELEASE ORAL
Status: DISCONTINUED | OUTPATIENT
Start: 2020-07-07 | End: 2020-07-09 | Stop reason: HOSPADM

## 2020-07-07 RX ORDER — NALOXONE HYDROCHLORIDE 0.4 MG/ML
.1-.4 INJECTION, SOLUTION INTRAMUSCULAR; INTRAVENOUS; SUBCUTANEOUS
Status: DISCONTINUED | OUTPATIENT
Start: 2020-07-07 | End: 2020-07-09 | Stop reason: HOSPADM

## 2020-07-07 RX ORDER — FOLIC ACID 1 MG/1
1 TABLET ORAL DAILY
Status: DISCONTINUED | OUTPATIENT
Start: 2020-07-08 | End: 2020-07-09 | Stop reason: HOSPADM

## 2020-07-07 RX ORDER — MULTIPLE VITAMINS W/ MINERALS TAB 9MG-400MCG
1 TAB ORAL DAILY
Status: DISCONTINUED | OUTPATIENT
Start: 2020-07-08 | End: 2020-07-09 | Stop reason: HOSPADM

## 2020-07-07 RX ORDER — AMOXICILLIN 250 MG
1-2 CAPSULE ORAL 2 TIMES DAILY
Status: DISCONTINUED | OUTPATIENT
Start: 2020-07-07 | End: 2020-07-09 | Stop reason: HOSPADM

## 2020-07-07 RX ORDER — NICOTINE POLACRILEX 4 MG
15-30 LOZENGE BUCCAL
Status: DISCONTINUED | OUTPATIENT
Start: 2020-07-07 | End: 2020-07-09 | Stop reason: HOSPADM

## 2020-07-07 RX ORDER — POTASSIUM CL/LIDO/0.9 % NACL 10MEQ/0.1L
10 INTRAVENOUS SOLUTION, PIGGYBACK (ML) INTRAVENOUS
Status: DISCONTINUED | OUTPATIENT
Start: 2020-07-07 | End: 2020-07-09 | Stop reason: HOSPADM

## 2020-07-07 RX ORDER — ACETAMINOPHEN 500 MG
1000 TABLET ORAL 2 TIMES DAILY
COMMUNITY

## 2020-07-07 RX ORDER — OXYCODONE HYDROCHLORIDE 5 MG/1
5-10 TABLET ORAL EVERY 4 HOURS PRN
Status: DISCONTINUED | OUTPATIENT
Start: 2020-07-07 | End: 2020-07-09 | Stop reason: HOSPADM

## 2020-07-07 RX ORDER — LORAZEPAM 2 MG/ML
1-2 INJECTION INTRAMUSCULAR EVERY 30 MIN PRN
Status: DISCONTINUED | OUTPATIENT
Start: 2020-07-07 | End: 2020-07-08

## 2020-07-07 RX ADMIN — HYDROMORPHONE HYDROCHLORIDE 0.5 MG: 1 INJECTION, SOLUTION INTRAMUSCULAR; INTRAVENOUS; SUBCUTANEOUS at 13:08

## 2020-07-07 RX ADMIN — INSULIN GLARGINE 25 UNITS: 100 INJECTION, SOLUTION SUBCUTANEOUS at 21:29

## 2020-07-07 RX ADMIN — HYDROMORPHONE HYDROCHLORIDE 0.5 MG: 1 INJECTION, SOLUTION INTRAMUSCULAR; INTRAVENOUS; SUBCUTANEOUS at 19:38

## 2020-07-07 RX ADMIN — KETOROLAC TROMETHAMINE 15 MG: 15 INJECTION, SOLUTION INTRAMUSCULAR; INTRAVENOUS at 10:02

## 2020-07-07 RX ADMIN — OXYCODONE HYDROCHLORIDE 10 MG: 5 TABLET ORAL at 21:01

## 2020-07-07 RX ADMIN — HYDROMORPHONE HYDROCHLORIDE 0.5 MG: 1 INJECTION, SOLUTION INTRAMUSCULAR; INTRAVENOUS; SUBCUTANEOUS at 10:02

## 2020-07-07 RX ADMIN — ONDANSETRON 4 MG: 2 INJECTION INTRAMUSCULAR; INTRAVENOUS at 08:56

## 2020-07-07 RX ADMIN — SODIUM CHLORIDE 1000 ML: 9 INJECTION, SOLUTION INTRAVENOUS at 08:41

## 2020-07-07 RX ADMIN — SODIUM CHLORIDE, POTASSIUM CHLORIDE, SODIUM LACTATE AND CALCIUM CHLORIDE: 600; 310; 30; 20 INJECTION, SOLUTION INTRAVENOUS at 12:52

## 2020-07-07 RX ADMIN — CYCLOBENZAPRINE HYDROCHLORIDE 10 MG: 5 TABLET, FILM COATED ORAL at 18:11

## 2020-07-07 RX ADMIN — NICOTINE POLACRILEX 2 MG: 2 GUM, CHEWING ORAL at 18:25

## 2020-07-07 RX ADMIN — SODIUM CHLORIDE 1000 ML: 9 INJECTION, SOLUTION INTRAVENOUS at 10:07

## 2020-07-07 RX ADMIN — HYDROMORPHONE HYDROCHLORIDE 0.5 MG: 1 INJECTION, SOLUTION INTRAMUSCULAR; INTRAVENOUS; SUBCUTANEOUS at 23:57

## 2020-07-07 RX ADMIN — FAMOTIDINE 20 MG: 10 INJECTION, SOLUTION INTRAVENOUS at 23:57

## 2020-07-07 RX ADMIN — HYDROMORPHONE HYDROCHLORIDE 0.5 MG: 1 INJECTION, SOLUTION INTRAMUSCULAR; INTRAVENOUS; SUBCUTANEOUS at 15:29

## 2020-07-07 RX ADMIN — OXYCODONE HYDROCHLORIDE 10 MG: 5 TABLET ORAL at 18:10

## 2020-07-07 RX ADMIN — OXYCODONE HYDROCHLORIDE 5 MG: 5 TABLET ORAL at 14:03

## 2020-07-07 RX ADMIN — HYDROMORPHONE HYDROCHLORIDE 0.5 MG: 1 INJECTION, SOLUTION INTRAMUSCULAR; INTRAVENOUS; SUBCUTANEOUS at 21:52

## 2020-07-07 RX ADMIN — IOPAMIDOL 100 ML: 755 INJECTION, SOLUTION INTRAVENOUS at 09:10

## 2020-07-07 RX ADMIN — HYDROMORPHONE HYDROCHLORIDE 1 MG: 1 INJECTION, SOLUTION INTRAMUSCULAR; INTRAVENOUS; SUBCUTANEOUS at 08:56

## 2020-07-07 RX ADMIN — HYDROMORPHONE HYDROCHLORIDE 0.5 MG: 1 INJECTION, SOLUTION INTRAMUSCULAR; INTRAVENOUS; SUBCUTANEOUS at 17:30

## 2020-07-07 RX ADMIN — SODIUM CHLORIDE, POTASSIUM CHLORIDE, SODIUM LACTATE AND CALCIUM CHLORIDE: 600; 310; 30; 20 INJECTION, SOLUTION INTRAVENOUS at 19:44

## 2020-07-07 RX ADMIN — NICOTINE POLACRILEX 2 MG: 2 GUM, CHEWING ORAL at 17:26

## 2020-07-07 RX ADMIN — CYCLOBENZAPRINE HYDROCHLORIDE 10 MG: 5 TABLET, FILM COATED ORAL at 21:30

## 2020-07-07 RX ADMIN — FAMOTIDINE 20 MG: 10 INJECTION, SOLUTION INTRAVENOUS at 13:08

## 2020-07-07 RX ADMIN — SODIUM CHLORIDE 65 ML: 9 INJECTION, SOLUTION INTRAVENOUS at 09:10

## 2020-07-07 RX ADMIN — HYDROMORPHONE HYDROCHLORIDE 0.5 MG: 1 INJECTION, SOLUTION INTRAMUSCULAR; INTRAVENOUS; SUBCUTANEOUS at 12:09

## 2020-07-07 RX ADMIN — HYDROXYZINE HYDROCHLORIDE 25 MG: 25 TABLET ORAL at 23:57

## 2020-07-07 ASSESSMENT — ACTIVITIES OF DAILY LIVING (ADL)
ADLS_ACUITY_SCORE: 10
ADLS_ACUITY_SCORE: 10

## 2020-07-07 ASSESSMENT — ENCOUNTER SYMPTOMS
DIARRHEA: 0
ABDOMINAL PAIN: 1
CONSTIPATION: 1
COUGH: 0
RHINORRHEA: 0
NAUSEA: 0
DYSURIA: 0
FEVER: 0
VOMITING: 0
DIFFICULTY URINATING: 1

## 2020-07-07 ASSESSMENT — MIFFLIN-ST. JEOR: SCORE: 2282.06

## 2020-07-07 NOTE — ED TRIAGE NOTES
Patient presents with complaints of abdomen pain which started on Sunday and has been increasing. Patient states he is diabetic and his sugars have been running high as well. ABC intact without need for intervention at this time.

## 2020-07-07 NOTE — PHARMACY-ADMISSION MEDICATION HISTORY
Admission medication history interview status for this patient is complete. See [facility] admission navigator for allergy information, prior to admission medications and immunization status.     Medication history interview done via telephone during Covid-19 pandemic, indicate source(s): Patient  Medication history resources (including written lists, pill bottles, clinic record):epic list    Changes made to PTA medication list:  Added: ozempic, naproxen, lantus (insurance reasons)  Deleted: ibuprofen, basaglar  Changed: tylenol, atorvastatin    Actions taken by pharmacist (provider contacted, etc):None     Additional medication history information:Pt's wife mentioned that pt was started Ozempic 1 week ago and since then, pt experienced several episodes of hypoglycemia over last week. Lantus dose was not adjusted with the addition of ozempic by PCP    Medication reconciliation/reorder completed by provider prior to medication history?  N   (Y/N)     For patients on insulin therapy: Y  (Y/N)  Do you eat three meals a day:  Yes  How many times do you check your blood glucose per day:  Has DEX[employer] CGM  How many episodes of hypoglycemia do you have per week: 4-5 recently since addition of ozempic to diabetes regimen  Do you have a Continuous glucose monitor (CGM) : Y (remind pt that not approved for hospital use)  Any specific barriers to therapy? N  (cost, comfortable with injections, confident with current diabetes regimen?)      Prior to Admission medications    Medication Sig Last Dose Taking? Auth Provider   acetaminophen (TYLENOL) 500 MG tablet Take 1,000 mg by mouth 2 times daily 7/6/2020 at Unknown time Yes Unknown, Entered By History   ATORVASTATIN CALCIUM PO Take 80 mg by mouth At Bedtime  7/6/2020 at Unknown time Yes Reported, Patient   cyclobenzaprine (FLEXERIL) 10 MG tablet Take 10 mg by mouth 3 times daily as needed for muscle spasms Past Week at Unknown time Yes Unknown, Entered By History   insulin glargine  (LANTUS PEN) 100 UNIT/ML pen Inject 45-50 Units Subcutaneous At Bedtime lantus 45 if NPO  If eat, 50 7/6/2020 at Unknown time Yes Unknown, Entered By History   lisinopril (PRINIVIL/ZESTRIL) 20 MG tablet Take 20 mg by mouth daily 7/6/2020 at Unknown time Yes Reported, Patient   lisinopril-hydrochlorothiazide (ZESTORETIC) 20-25 MG tablet Take 1 tablet by mouth daily 7/6/2020 at Unknown time Yes Unknown, Entered By History       Reported, Patient   metFORMIN (GLUCOPHAGE-XR) 500 MG 24 hr tablet Take 2,000 mg by mouth daily (with dinner)  7/6/2020 at Unknown time Yes Reported, Patient   naloxone (NARCAN) 1 mg/mL for intranasal kit (2 syringes with 2 mucosal atomizer device) Apply 1 kit into one nostril as directed as needed In opioid overdose put cone in nostril and push 1/2 of contents into each nostril.  Repeat every 3 min if no response until help arrives.  Yes Reported, Patient   naproxen (NAPROSYN) 500 MG tablet Take 500 mg by mouth 2 times daily (with meals) 7/6/2020 at Unknown time Yes Reported, Patient   oxyCODONE (ROXICODONE) 5 MG tablet Take 1-2 tablets (5-10 mg) by mouth every 4 hours as needed for moderate to severe pain 7/6/2020 at Unknown time Yes Светлана Rothman PA-C   Semaglutide,0.25 or 0.5MG/DOS, (OZEMPIC, 0.25 OR 0.5 MG/DOSE,) 2 MG/1.5ML SOPN Inject 0.25 mg Subcutaneous every 7 days On Wednesdays 7/6/2020 at Unknown time Yes Unknown, Entered By History   senna-docusate (SENOKOT-S/PERICOLACE) 8.6-50 MG tablet Take 1-2 tablets by mouth 2 times daily 7/6/2020 at Unknown time Yes Светлана Rothman PA-C   fish oil-omega-3 fatty acids 1000 MG capsule Take 2 g by mouth 2 times daily  at held for surgery  Reported, Patient

## 2020-07-07 NOTE — PLAN OF CARE
BP (!) 153/91   Pulse 80   Temp 97.2  F (36.2  C) (Oral)   Resp 24   Ht 1.829 m (6')   Wt 131.9 kg (290 lb 12.8 oz)   SpO2 93%   BMI 39.44 kg/m      A&O. Up ad ash. NPO w/ ice chips. Pt c/o 8/10 abdominal pain. PRN Dilaudid given x2. Pt states he only gets minimal relief from Dilaudid. LR running at 150 ml/hr. Plan for abdominal ultra sound today. K 3.6. Mg 1.8, ok per protocols. Pt educated on plan of care.

## 2020-07-07 NOTE — ED PROVIDER NOTES
History     Chief Complaint:  Abdominal Pain       HPI   Ozzie Olmedo is a 39 year old male with a history of diabetes, hypertension, hyperlipidemia, and chronic pain who presents to the ED for evaluation of abdominal pain. The patient reports that he first developed the onset of abdominal pain 2 days ago while on a boat. This was initially mild but has been progressing since then, and yesterday he was unable to get out of bed secondary to the severity of his discomfort. The patient describes this as a generalized abdominal pain that has been moving around in location, although it is more severe in the RUQ and RLQ. His pain reportedly radiates into the back and is associated with constipation, with the patient noting his last bowel movement was 3 days ago. He tried taking Percocet, antacids, and senna last night without any relief, and thus he presented to the ED today for evaluation. Here in the ED, the patient additionally states that his sugars have been elevated despite no PO intake for the past 36 hours. He has continued to take his insulin and diabetic medications, although he did start a new medication, Ozempic, last Wednesday. The patient denies any nausea, vomiting, diarrhea, fevers, cough, rhinorrhea, scrotal changes, or dysuria, although he has been having difficulties maintaining a urine stream. He denies any recent sick exposures and he has no history of abdominal surgery.  He states he was drinking ETOH 2 days ago.     Allergies:  NKDA      Medications:    Atorvastatin  Cyclobenzaprine  Basaglar  Lisinopril-hydrochlorothiazide  Metformin  Narcan  Percocet  Senokot-S     Past Medical History:    Diabetes  Hypertension  Hyperlipidemia  Chronic pain  Overweight  Juvenile osteochondrosis of spine  Lumbago    Past Surgical History:    L1-L2, L4-5, and L5-S1 surgery  Cardiac ablation  Laminectomy lumbar 2 levels  Repair hammer toe  Repair ligament ulnar collateral, right    Family History:    No past  pertinent family history.    Social History:  Smoking Status: Current every day smoker   Alcohol Use: Yes  Drug Use: Yes (marijuana)  Presents to the ED with his wife.  Marital Status:   [2]     Review of Systems   Constitutional: Negative for fever.   HENT: Negative for rhinorrhea.    Respiratory: Negative for cough.    Gastrointestinal: Positive for abdominal pain and constipation. Negative for diarrhea, nausea and vomiting.   Genitourinary: Positive for difficulty urinating. Negative for dysuria and scrotal swelling.   All other systems reviewed and are negative.      Physical Exam     Patient Vitals for the past 24 hrs:   BP Temp Temp src Pulse Resp SpO2   07/07/20 0850 -- -- -- -- -- 96 %   07/07/20 0845 -- -- -- 80 -- 94 %   07/07/20 0804 (!) 144/104 98.1  F (36.7  C) Oral 115 18 93 %        Physical Exam  Nursing note and vitals reviewed.  Constitutional: Well nourished. Appears uncomfortable  Eyes: Conjunctiva normal.  Pupils are equal, round, and reactive to light.   ENT: Nose normal. Mucous membranes pink and moist.    Neck: Normal range of motion.  CVS: Sinus tachycardia.  Normal heart sounds.  No murmur.  Pulmonary: Lungs clear to auscultation bilaterally. No wheezes/rales/rhonchi.  GI: Abdomen soft. Generalized abdominal tenderness.  No rigidity or guarding.  No CVA tenderness. LLQ blood sugar monitor with mild ecchymosis surrounding though no induration/erythema/warmth  MSK: No calf tenderness or swelling.  No c/t/l bony tenderness  Neuro: Alert. Follows simple commands.   Skin: Skin is warm and dry. No rash noted.   Psychiatric: Normal affect.       Emergency Department Course   ECG:  Indication: Abdominal pain  Time: 0832  Vent. Rate 94 bpm. HI interval 112. QRS duration 104. QT/QTc 358/447. P-R-T axis 50 63 32.  Normal sinus rhythm. Normal ECG. Read time: 0832     Imaging:  Radiographic findings were communicated with the patient who voiced understanding of the findings.  CT Abdomen/Pelvis  with IV contrast TRAUMA/AAA:   1. Peripancreatic fat stranding most prominent around the pancreatic   head, worrisome for acute pancreatitis. Recommend correlation with   serum lipase. No significant pancreatic or peripancreatic loculated   collection.   2. Significant diffuse hepatic steatosis with areas of focal fatty   sparing.   3. Small amount of free fluid in the upper abdomen predominantly   around the pancreas and in the pelvis, likely reactive, as per radiology.     Laboratory:  CBC: WBC: 16.2 (H), HGB: 15.6, PLT: 232  CMP: Glucose 172 (H),  (L), o/w WNL (Creatinine: 0.91)    Creatinine POCT: Creatinine 1.0, GFR 84    0842 Troponin: <0.015     Lipase: 1,145 (H)    0823 Glucose by meter: 182 (H)    Ketone Beta-Hydroxybutyrate Quantitative: 0.3    VBG: pH 7.46 (H), pO2 49 (H), Bicarb 32 (H), o/w WNL    UA with Microscopic: pH 7.5 (H), o/w WNL     Interventions:  0841 NS 1L IV  0856 Zofran, 4 mg, IV injection   Dilaudid, 1 mg, IV injection  1002 Dilaudid, 0.5 mg, IV injection    Toradol 15 mg IV  1007 NS 1L IV     Emergency Department Course:  Past medical records, nursing notes, and vitals reviewed.     0823 I performed an exam of the patient as documented above.     IV inserted. Medicine administered as documented above. Blood drawn. This was sent to the lab for further testing, results above.    EKG obtained in the ED, see results above.     The patient was sent for a CT of the abdomen and pelvis while in the emergency department, findings above.     I rechecked the patient and discussed the results of his workup thus far. We discussed the likely plan for admission, and he felt comfortable with this.     1049  I consulted with Brittany Fenton PA-C of the hospitalist services. They are in agreement to accept the patient for admission to Dr. Hall.    Findings and plan explained to the Patient who consents to admission. Discussed the patient with Brittany Fenton for Dr. Hall, who will admit the patient to  an adult med/surg bed for further monitoring, evaluation, and treatment.    Impression & Plan      Medical Decision Making:  Ozzie Olmedo is a 39 year old male who presents with generalized abdominal pain.  The differential diagnosis would include GERD, GIB, esophageal spasm, atypical cardiac sx's, pancreatitis, biliary colic or gallstone disease, AAA, gastroenteritis, gastritis, large vs small bowel disease, etc.  Based on history, PE and labs, the most likely explanation is pancreatitis.  His symptoms are not consistent with cholecystitis. Nonspecific leukocytosis though I suspect this is more reactive in setting of vomiting.   No evidence to suggest sepsis.  No DKA today. Possible pancreatitis is medication induced from Ozempic.     Patient continued to have intractable pain during his time in the ED.  He already is on outpatient percocet.  Patient accepted for admission.  Patients questions answered.      Diagnosis:    ICD-10-CM    1. Acute pancreatitis, unspecified complication status, unspecified pancreatitis type  K85.90 Lipase     Lipase     CANCELED: Lipase       Disposition:  Admitted to Dr. Rafael Sandersibmariaa Disclosure:  I, Ruthann Gaines, am serving as a scribe on 7/7/2020 at 8:26 AM to personally document services performed by Minnie Tucker DO based on my observations and the provider's statements to me.      Ruthann Gaines  7/7/2020   [facility] EMERGENCY DEPARTMENT       Minnie Tucker DO  07/07/20 [id number]

## 2020-07-07 NOTE — PHARMACY-CONSULT NOTE
Consult for review of meds for pancreatitis:    Ozempic- from [facility]  Gastrointestinal: Acute and chronic pancreatitis has been reported; monitoring recommended and discontinue use if suspected [3]. If confirmed, do not restart therapy [2]    Naproxen has listed as possible  (less than 1%)    hydrochlorothiazide- has listed as possible serious adverse effect- does not list %

## 2020-07-07 NOTE — PLAN OF CARE
"16:30  Text page sent to MD, \"Pt is requesting nicotine replacement.  He prefers gum over patches if available.  Thanks\"    17:40 Text page sent to MD, \"Pt is requesting muscle relaxers.  (He usually takes 2 flexaril at night.) He also wants restoril for sleep.  (He says melatonin does not work for him) Thanks\"    21:10 Text page sent to MD, \"Pt wondering about prior to admission meds: lisinopril, lisinopril/hydrochlorothiazide and atvorstatin.  Can those be re-ordered? Thanks\"    21:20 Text page sent to MD, \"Pt showing signs of anxiety and possible ETOH W/D.  Do you think he could benefit from CIWA protocol? Also, the admit doctor note mentions obtaining an abdominal ultrasound, but no order found.  Could you write orders if you think its indicated? Thanks\"    21:55 Pt alert and oriented.  He is agitated / restless and can not get comfortable.  He is c/o back, abdomen and shoulder pain.  PRN dilaudid and PRN oxycodone and scheduled flexaril given without much relief.  This shift he urgently wanted to take a shower, then walk in the hallway.  He is very uncomfortable in his hospital bed.  The recliner chair is equally uncomfortable and unacceptable for him.  A specialty bed was ordered but has not arrived yet.  Pt decided to rest on the floor with his head on a pillow and his feet on the recliner chair while he waits for his new bed.  He did not want any mattress or blanket under him. Will continue to treat with IV fluids, pain meds, nicotine gum, per orders.  NPO except meds and ice chips.    23:00 Text page sent to MD, \" pt is not getting pain relief from current medications.  Could we try a larger dose of dilaudid or something else? (He is nearly 300 pounds)  Thank you\"  "

## 2020-07-07 NOTE — ED NOTES
[facility]  ED Nurse Handoff Report    Ozzie Olmedo is a 39 year old male   ED Chief complaint: Abdominal Pain and Hyperglycemia  . ED Diagnosis:   Final diagnoses:   Acute pancreatitis, unspecified complication status, unspecified pancreatitis type     Allergies:   Allergies   Allergen Reactions     No Known Drug Allergies        Code Status: Full Code  Activity level - Baseline/Home:  Independent. Activity Level - Current:   Stand by Assist. Lift room needed: No. Bariatric: No   Needed: No   Isolation: No. Infection: Not Applicable.     Vital Signs:   Vitals:    07/07/20 0850 07/07/20 0930 07/07/20 1001 07/07/20 1010   BP:  (!) 174/96  (!) 158/89   Pulse:       Resp:  21 11 18   Temp:       TempSrc:       SpO2: 96% 95% 97% 93%       Cardiac Rhythm:  ,      Pain level: 0-10 Pain Scale: 6  Patient confused: No. Patient Falls Risk: Yes.   Elimination Status: Has voided   Patient Report - Initial Complaint: Patient presents with complaints of abdomen pain which started on Sunday and has been increasing. Patient states he is diabetic and his sugars have been running high as well. ABC intact without need for intervention at this time.    . Focused Assessment: Generalized abd pain   Tests Performed: labs, CT. Abnormal Results:   Labs Ordered and Resulted from Time of ED Arrival Up to the Time of Departure from the ED   CBC WITH PLATELETS DIFFERENTIAL - Abnormal; Notable for the following components:       Result Value    WBC 16.2 (*)     Absolute Neutrophil 13.3 (*)     All other components within normal limits   COMPREHENSIVE METABOLIC PANEL - Abnormal; Notable for the following components:    Sodium 131 (*)     Glucose 172 (*)     All other components within normal limits   ROUTINE UA WITH MICROSCOPIC - Abnormal; Notable for the following components:    pH Urine 7.5 (*)     All other components within normal limits   BLOOD GAS VENOUS AND OXYHGB - Abnormal; Notable for the following components:     Ph Venous 7.46 (*)     PO2 Venous 49 (*)     Bicarbonate Venous 32 (*)     All other components within normal limits   GLUCOSE BY METER - Abnormal; Notable for the following components:    Glucose 182 (*)     All other components within normal limits   GLUCOSE MONITOR NURSING POCT   KETONE BETA-HYDROXYBUTYRATE QUANTITATIVE   CREATININE POCT   TROPONIN I   LIPASE   ISTAT CREATININE NURSING POCT     Abd/pelvis CT,  IV  contrast only TRAUMA / AAA   Preliminary Result   IMPRESSION:   1. Peripancreatic fat stranding most prominent around the pancreatic   head, worrisome for acute pancreatitis. Recommend correlation with   serum lipase. No significant pancreatic or peripancreatic loculated   collection.   2. Significant diffuse hepatic steatosis with areas of focal fatty   sparing.   3. Small amount of free fluid in the upper abdomen predominantly   around the pancreas and in the pelvis, likely reactive.              .   Treatments provided: See MAR  Family Comments: Spouse  OBS brochure/video discussed/provided to patient:  N/A  ED Medications:   Medications   0.9% sodium chloride BOLUS (1,000 mLs Intravenous New Bag 7/7/20 1007)   0.9% sodium chloride BOLUS (0 mLs Intravenous Stopped 7/7/20 1001)   ondansetron (ZOFRAN) injection 4 mg (4 mg Intravenous Given 7/7/20 0856)   HYDROmorphone (DILAUDID) injection 1 mg (1 mg Intravenous Given 7/7/20 0856)   iopamidol (ISOVUE-370) solution 500 mL (100 mLs Intravenous Given 7/7/20 0910)   sodium chloride 0.9 % bag 500mL for CT scan flush use (65 mLs Intravenous Given 7/7/20 0910)   HYDROmorphone (PF) (DILAUDID) injection 0.5 mg (0.5 mg Intravenous Given 7/7/20 1002)   ketorolac (TORADOL) injection 15 mg (15 mg Intravenous Given 7/7/20 1002)     Drips infusing:  No  For the majority of the shift, the patient's behavior Green. Interventions performed were none.    Sepsis treatment initiated: No       ED Nurse Name/Phone Number: Alejandra Bowen RN,   10:11 AM    RECEIVING  UNIT ED HANDOFF REVIEW    Above ED Nurse Handoff Report was reviewed: Yes  Reviewed by: Nell Hardin RN on July 7, 2020 at 12:18 PM
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
## 2020-07-08 ENCOUNTER — APPOINTMENT (OUTPATIENT)
Dept: ULTRASOUND IMAGING | Facility: CLINIC | Age: 40
DRG: 440 | End: 2020-07-08
Attending: PHYSICIAN ASSISTANT
Payer: COMMERCIAL

## 2020-07-08 LAB
ALBUMIN SERPL-MCNC: 2.8 G/DL (ref 3.4–5)
ALP SERPL-CCNC: 71 U/L (ref 40–150)
ALT SERPL W P-5'-P-CCNC: 29 U/L (ref 0–70)
ANION GAP SERPL CALCULATED.3IONS-SCNC: 5 MMOL/L (ref 3–14)
AST SERPL W P-5'-P-CCNC: 22 U/L (ref 0–45)
BILIRUB SERPL-MCNC: 0.6 MG/DL (ref 0.2–1.3)
BUN SERPL-MCNC: 12 MG/DL (ref 7–30)
CALCIUM SERPL-MCNC: 8.3 MG/DL (ref 8.5–10.1)
CHLORIDE SERPL-SCNC: 100 MMOL/L (ref 94–109)
CO2 SERPL-SCNC: 29 MMOL/L (ref 20–32)
CREAT SERPL-MCNC: 0.81 MG/DL (ref 0.66–1.25)
ERYTHROCYTE [DISTWIDTH] IN BLOOD BY AUTOMATED COUNT: 11.9 % (ref 10–15)
GFR SERPL CREATININE-BSD FRML MDRD: >90 ML/MIN/{1.73_M2}
GLUCOSE BLDC GLUCOMTR-MCNC: 103 MG/DL (ref 70–99)
GLUCOSE BLDC GLUCOMTR-MCNC: 113 MG/DL (ref 70–99)
GLUCOSE BLDC GLUCOMTR-MCNC: 122 MG/DL (ref 70–99)
GLUCOSE BLDC GLUCOMTR-MCNC: 123 MG/DL (ref 70–99)
GLUCOSE BLDC GLUCOMTR-MCNC: 139 MG/DL (ref 70–99)
GLUCOSE BLDC GLUCOMTR-MCNC: 140 MG/DL (ref 70–99)
GLUCOSE SERPL-MCNC: 123 MG/DL (ref 70–99)
HCT VFR BLD AUTO: 38.6 % (ref 40–53)
HGB BLD-MCNC: 12.8 G/DL (ref 13.3–17.7)
LIPASE SERPL-CCNC: 318 U/L (ref 73–393)
MCH RBC QN AUTO: 31.8 PG (ref 26.5–33)
MCHC RBC AUTO-ENTMCNC: 33.2 G/DL (ref 31.5–36.5)
MCV RBC AUTO: 96 FL (ref 78–100)
PLATELET # BLD AUTO: 178 10E9/L (ref 150–450)
POTASSIUM SERPL-SCNC: 3.7 MMOL/L (ref 3.4–5.3)
PROT SERPL-MCNC: 5.9 G/DL (ref 6.8–8.8)
RBC # BLD AUTO: 4.03 10E12/L (ref 4.4–5.9)
SARS-COV-2 PCR COMMENT: NORMAL
SARS-COV-2 RNA SPEC QL NAA+PROBE: NEGATIVE
SARS-COV-2 RNA SPEC QL NAA+PROBE: NORMAL
SODIUM SERPL-SCNC: 134 MMOL/L (ref 133–144)
SPECIMEN SOURCE: NORMAL
SPECIMEN SOURCE: NORMAL
WBC # BLD AUTO: 13.8 10E9/L (ref 4–11)

## 2020-07-08 PROCEDURE — 25000125 ZZHC RX 250: Performed by: PHYSICIAN ASSISTANT

## 2020-07-08 PROCEDURE — 80053 COMPREHEN METABOLIC PANEL: CPT | Performed by: PHYSICIAN ASSISTANT

## 2020-07-08 PROCEDURE — 25000131 ZZH RX MED GY IP 250 OP 636 PS 637: Performed by: PHYSICIAN ASSISTANT

## 2020-07-08 PROCEDURE — 76705 ECHO EXAM OF ABDOMEN: CPT

## 2020-07-08 PROCEDURE — 83690 ASSAY OF LIPASE: CPT | Performed by: PHYSICIAN ASSISTANT

## 2020-07-08 PROCEDURE — 99232 SBSQ HOSP IP/OBS MODERATE 35: CPT | Performed by: HOSPITALIST

## 2020-07-08 PROCEDURE — 25000128 H RX IP 250 OP 636: Performed by: HOSPITALIST

## 2020-07-08 PROCEDURE — 36415 COLL VENOUS BLD VENIPUNCTURE: CPT | Performed by: PHYSICIAN ASSISTANT

## 2020-07-08 PROCEDURE — 25000132 ZZH RX MED GY IP 250 OP 250 PS 637: Performed by: INTERNAL MEDICINE

## 2020-07-08 PROCEDURE — 25000132 ZZH RX MED GY IP 250 OP 250 PS 637: Performed by: PHYSICIAN ASSISTANT

## 2020-07-08 PROCEDURE — 00000146 ZZHCL STATISTIC GLUCOSE BY METER IP

## 2020-07-08 PROCEDURE — 25000132 ZZH RX MED GY IP 250 OP 250 PS 637: Performed by: HOSPITALIST

## 2020-07-08 PROCEDURE — 25800030 ZZH RX IP 258 OP 636: Performed by: HOSPITALIST

## 2020-07-08 PROCEDURE — 12000000 ZZH R&B MED SURG/OB

## 2020-07-08 PROCEDURE — 25000128 H RX IP 250 OP 636: Performed by: PHYSICIAN ASSISTANT

## 2020-07-08 PROCEDURE — 85027 COMPLETE CBC AUTOMATED: CPT | Performed by: PHYSICIAN ASSISTANT

## 2020-07-08 RX ORDER — KETOROLAC TROMETHAMINE 15 MG/ML
15 INJECTION, SOLUTION INTRAMUSCULAR; INTRAVENOUS EVERY 6 HOURS PRN
Status: DISCONTINUED | OUTPATIENT
Start: 2020-07-08 | End: 2020-07-09 | Stop reason: HOSPADM

## 2020-07-08 RX ORDER — LIDOCAINE 4 G/G
1 PATCH TOPICAL
Status: DISCONTINUED | OUTPATIENT
Start: 2020-07-08 | End: 2020-07-08

## 2020-07-08 RX ORDER — HYDRALAZINE HYDROCHLORIDE 20 MG/ML
10 INJECTION INTRAMUSCULAR; INTRAVENOUS EVERY 4 HOURS PRN
Status: DISCONTINUED | OUTPATIENT
Start: 2020-07-08 | End: 2020-07-09 | Stop reason: HOSPADM

## 2020-07-08 RX ORDER — LIDOCAINE 4 G/G
2 PATCH TOPICAL
Status: DISCONTINUED | OUTPATIENT
Start: 2020-07-09 | End: 2020-07-08

## 2020-07-08 RX ORDER — MORPHINE SULFATE 2 MG/ML
2-4 INJECTION, SOLUTION INTRAMUSCULAR; INTRAVENOUS EVERY 4 HOURS PRN
Status: DISCONTINUED | OUTPATIENT
Start: 2020-07-08 | End: 2020-07-09 | Stop reason: HOSPADM

## 2020-07-08 RX ORDER — LIDOCAINE 4 G/G
2 PATCH TOPICAL
Status: DISCONTINUED | OUTPATIENT
Start: 2020-07-08 | End: 2020-07-09 | Stop reason: HOSPADM

## 2020-07-08 RX ADMIN — MORPHINE SULFATE 4 MG: 2 INJECTION, SOLUTION INTRAMUSCULAR; INTRAVENOUS at 21:14

## 2020-07-08 RX ADMIN — FAMOTIDINE 20 MG: 10 INJECTION, SOLUTION INTRAVENOUS at 12:15

## 2020-07-08 RX ADMIN — CYCLOBENZAPRINE HYDROCHLORIDE 10 MG: 5 TABLET, FILM COATED ORAL at 16:13

## 2020-07-08 RX ADMIN — OXYCODONE HYDROCHLORIDE 10 MG: 5 TABLET ORAL at 16:13

## 2020-07-08 RX ADMIN — CYCLOBENZAPRINE HYDROCHLORIDE 10 MG: 5 TABLET, FILM COATED ORAL at 09:05

## 2020-07-08 RX ADMIN — LORAZEPAM 1 MG: 1 TABLET ORAL at 00:49

## 2020-07-08 RX ADMIN — LIDOCAINE 1 PATCH: 560 PATCH PERCUTANEOUS; TOPICAL; TRANSDERMAL at 09:04

## 2020-07-08 RX ADMIN — HYDROMORPHONE HYDROCHLORIDE 0.5 MG: 1 INJECTION, SOLUTION INTRAMUSCULAR; INTRAVENOUS; SUBCUTANEOUS at 03:30

## 2020-07-08 RX ADMIN — OXYCODONE HYDROCHLORIDE 10 MG: 5 TABLET ORAL at 05:15

## 2020-07-08 RX ADMIN — HYDROMORPHONE HYDROCHLORIDE 0.5 MG: 1 INJECTION, SOLUTION INTRAMUSCULAR; INTRAVENOUS; SUBCUTANEOUS at 06:03

## 2020-07-08 RX ADMIN — MORPHINE SULFATE 4 MG: 2 INJECTION, SOLUTION INTRAMUSCULAR; INTRAVENOUS at 17:08

## 2020-07-08 RX ADMIN — HYDROXYZINE HYDROCHLORIDE 25 MG: 25 TABLET ORAL at 06:04

## 2020-07-08 RX ADMIN — KETOROLAC TROMETHAMINE 15 MG: 15 INJECTION, SOLUTION INTRAMUSCULAR; INTRAVENOUS at 14:39

## 2020-07-08 RX ADMIN — MORPHINE SULFATE 2 MG: 2 INJECTION, SOLUTION INTRAMUSCULAR; INTRAVENOUS at 12:27

## 2020-07-08 RX ADMIN — DOCUSATE SODIUM AND SENNOSIDES 1 TABLET: 8.6; 5 TABLET, FILM COATED ORAL at 09:05

## 2020-07-08 RX ADMIN — FOLIC ACID 1 MG: 1 TABLET ORAL at 09:05

## 2020-07-08 RX ADMIN — CYCLOBENZAPRINE HYDROCHLORIDE 10 MG: 5 TABLET, FILM COATED ORAL at 21:15

## 2020-07-08 RX ADMIN — OXYCODONE HYDROCHLORIDE 10 MG: 5 TABLET ORAL at 20:05

## 2020-07-08 RX ADMIN — LISINOPRIL 40 MG: 40 TABLET ORAL at 09:05

## 2020-07-08 RX ADMIN — ACETAMINOPHEN 650 MG: 325 TABLET, FILM COATED ORAL at 17:08

## 2020-07-08 RX ADMIN — MULTIPLE VITAMINS W/ MINERALS TAB 1 TABLET: TAB at 09:03

## 2020-07-08 RX ADMIN — INSULIN GLARGINE 25 UNITS: 100 INJECTION, SOLUTION SUBCUTANEOUS at 21:18

## 2020-07-08 RX ADMIN — OXYCODONE HYDROCHLORIDE 10 MG: 5 TABLET ORAL at 00:49

## 2020-07-08 RX ADMIN — HYDROMORPHONE HYDROCHLORIDE 0.5 MG: 1 INJECTION, SOLUTION INTRAMUSCULAR; INTRAVENOUS; SUBCUTANEOUS at 01:39

## 2020-07-08 RX ADMIN — SODIUM CHLORIDE, POTASSIUM CHLORIDE, SODIUM LACTATE AND CALCIUM CHLORIDE: 600; 310; 30; 20 INJECTION, SOLUTION INTRAVENOUS at 13:29

## 2020-07-08 RX ADMIN — THIAMINE HCL TAB 100 MG 100 MG: 100 TAB at 09:05

## 2020-07-08 RX ADMIN — LIDOCAINE 2 PATCH: 560 PATCH PERCUTANEOUS; TOPICAL; TRANSDERMAL at 12:12

## 2020-07-08 RX ADMIN — SODIUM CHLORIDE, POTASSIUM CHLORIDE, SODIUM LACTATE AND CALCIUM CHLORIDE: 600; 310; 30; 20 INJECTION, SOLUTION INTRAVENOUS at 03:05

## 2020-07-08 RX ADMIN — OXYCODONE HYDROCHLORIDE 10 MG: 5 TABLET ORAL at 11:38

## 2020-07-08 ASSESSMENT — ACTIVITIES OF DAILY LIVING (ADL)
ADLS_ACUITY_SCORE: 11
ADLS_ACUITY_SCORE: 10
ADLS_ACUITY_SCORE: 11
ADLS_ACUITY_SCORE: 11

## 2020-07-08 NOTE — PROVIDER NOTIFICATION
Pt and wife would like to speak with you about treatment plan. Pt is contemplating leaving AMA, wife is not in agreement. When you have a chance could you come speak with them? Thank you!
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
## 2020-07-08 NOTE — PLAN OF CARE
Vitals: VSS, BP elevated. C/o back pain. Lidocaine patch, oxycodone, dilaudid, toradol, and morphine given. Pt removed mattress from bed and has been resting on mattress on the floor.   Cardiac: WNL. Denies SOB.   Respiratory: Lungs diminished. Infrequent nonproductive cough.   Neuro: A&Ox4. CIWA of 0 and 2. Cooperative.   GI/: Denies GI s/sx currently.   Skin: [place] to BLE.   LDAs: PIV to right arm, infusing with LR at 150.  Diet: NPO except for ice chips  Activity: Ind  Teaching: POC reviewed with pt. Questions asked and answered.  Plan: Pain management. CIWAs. Continue wit POC.

## 2020-07-08 NOTE — PROGRESS NOTES
X-cover    Called for inadequately controlled pain.  Admit for pancreatitis with mildly elevated lipase 1150, has hydromorphone 0.3-0.5 q 2 hours prn.  Looks to be taking oral pills ok.      Will add in oral oxy prn with hydroxyzine to augment narcotic effect.

## 2020-07-08 NOTE — PROGRESS NOTES
Notified by RN that patient is exhibiting some signs of anxiety and possible EtOH withdrawal.  Will order CIWA protocol

## 2020-07-08 NOTE — PLAN OF CARE
VS stable. Diminished LS. Distended abdomen with hypoactive BS. CIWA scores of 9, 5, and 2 and ativan given as  prescribed. Complained of back pain and pain med's given. Blood sugar levels of 140 and 103. Slept on and off throughout the night.

## 2020-07-09 VITALS
OXYGEN SATURATION: 92 % | WEIGHT: 290.8 LBS | DIASTOLIC BLOOD PRESSURE: 103 MMHG | BODY MASS INDEX: 39.39 KG/M2 | HEART RATE: 80 BPM | SYSTOLIC BLOOD PRESSURE: 164 MMHG | TEMPERATURE: 98.2 F | RESPIRATION RATE: 16 BRPM | HEIGHT: 72 IN

## 2020-07-09 LAB
ALBUMIN SERPL-MCNC: 3 G/DL (ref 3.4–5)
ALP SERPL-CCNC: 79 U/L (ref 40–150)
ALT SERPL W P-5'-P-CCNC: 29 U/L (ref 0–70)
ANION GAP SERPL CALCULATED.3IONS-SCNC: 4 MMOL/L (ref 3–14)
AST SERPL W P-5'-P-CCNC: 19 U/L (ref 0–45)
BILIRUB SERPL-MCNC: 0.7 MG/DL (ref 0.2–1.3)
BUN SERPL-MCNC: 12 MG/DL (ref 7–30)
CALCIUM SERPL-MCNC: 8.4 MG/DL (ref 8.5–10.1)
CHLORIDE SERPL-SCNC: 102 MMOL/L (ref 94–109)
CO2 SERPL-SCNC: 29 MMOL/L (ref 20–32)
CREAT SERPL-MCNC: 0.82 MG/DL (ref 0.66–1.25)
ERYTHROCYTE [DISTWIDTH] IN BLOOD BY AUTOMATED COUNT: 12.1 % (ref 10–15)
GFR SERPL CREATININE-BSD FRML MDRD: >90 ML/MIN/{1.73_M2}
GLUCOSE BLDC GLUCOMTR-MCNC: 110 MG/DL (ref 70–99)
GLUCOSE BLDC GLUCOMTR-MCNC: 111 MG/DL (ref 70–99)
GLUCOSE BLDC GLUCOMTR-MCNC: 133 MG/DL (ref 70–99)
GLUCOSE BLDC GLUCOMTR-MCNC: 136 MG/DL (ref 70–99)
GLUCOSE SERPL-MCNC: 109 MG/DL (ref 70–99)
HCT VFR BLD AUTO: 38.7 % (ref 40–53)
HGB BLD-MCNC: 13 G/DL (ref 13.3–17.7)
MCH RBC QN AUTO: 32.1 PG (ref 26.5–33)
MCHC RBC AUTO-ENTMCNC: 33.6 G/DL (ref 31.5–36.5)
MCV RBC AUTO: 96 FL (ref 78–100)
PLATELET # BLD AUTO: 204 10E9/L (ref 150–450)
POTASSIUM SERPL-SCNC: 3.4 MMOL/L (ref 3.4–5.3)
PROT SERPL-MCNC: 6.6 G/DL (ref 6.8–8.8)
RBC # BLD AUTO: 4.05 10E12/L (ref 4.4–5.9)
SODIUM SERPL-SCNC: 135 MMOL/L (ref 133–144)
WBC # BLD AUTO: 13.2 10E9/L (ref 4–11)

## 2020-07-09 PROCEDURE — 25000125 ZZHC RX 250: Performed by: PHYSICIAN ASSISTANT

## 2020-07-09 PROCEDURE — 25000128 H RX IP 250 OP 636: Performed by: HOSPITALIST

## 2020-07-09 PROCEDURE — 25000132 ZZH RX MED GY IP 250 OP 250 PS 637: Performed by: HOSPITALIST

## 2020-07-09 PROCEDURE — 80053 COMPREHEN METABOLIC PANEL: CPT | Performed by: HOSPITALIST

## 2020-07-09 PROCEDURE — 25800030 ZZH RX IP 258 OP 636: Performed by: HOSPITALIST

## 2020-07-09 PROCEDURE — 36415 COLL VENOUS BLD VENIPUNCTURE: CPT | Performed by: HOSPITALIST

## 2020-07-09 PROCEDURE — 25000132 ZZH RX MED GY IP 250 OP 250 PS 637: Performed by: PHYSICIAN ASSISTANT

## 2020-07-09 PROCEDURE — 25000132 ZZH RX MED GY IP 250 OP 250 PS 637: Performed by: INTERNAL MEDICINE

## 2020-07-09 PROCEDURE — 85027 COMPLETE CBC AUTOMATED: CPT | Performed by: HOSPITALIST

## 2020-07-09 PROCEDURE — 99239 HOSP IP/OBS DSCHRG MGMT >30: CPT | Performed by: HOSPITALIST

## 2020-07-09 PROCEDURE — 00000146 ZZHCL STATISTIC GLUCOSE BY METER IP

## 2020-07-09 RX ADMIN — MULTIPLE VITAMINS W/ MINERALS TAB 1 TABLET: TAB at 08:41

## 2020-07-09 RX ADMIN — THIAMINE HCL TAB 100 MG 100 MG: 100 TAB at 08:41

## 2020-07-09 RX ADMIN — OXYCODONE HYDROCHLORIDE 10 MG: 5 TABLET ORAL at 08:41

## 2020-07-09 RX ADMIN — LIDOCAINE 2 PATCH: 560 PATCH PERCUTANEOUS; TOPICAL; TRANSDERMAL at 08:40

## 2020-07-09 RX ADMIN — CYCLOBENZAPRINE HYDROCHLORIDE 10 MG: 5 TABLET, FILM COATED ORAL at 08:41

## 2020-07-09 RX ADMIN — HYDROXYZINE HYDROCHLORIDE 25 MG: 25 TABLET ORAL at 02:00

## 2020-07-09 RX ADMIN — OXYCODONE HYDROCHLORIDE 10 MG: 5 TABLET ORAL at 00:09

## 2020-07-09 RX ADMIN — DOCUSATE SODIUM AND SENNOSIDES 1 TABLET: 8.6; 5 TABLET, FILM COATED ORAL at 08:42

## 2020-07-09 RX ADMIN — ACETAMINOPHEN 650 MG: 325 TABLET, FILM COATED ORAL at 00:09

## 2020-07-09 RX ADMIN — ACETAMINOPHEN 650 MG: 325 TABLET, FILM COATED ORAL at 12:08

## 2020-07-09 RX ADMIN — FOLIC ACID 1 MG: 1 TABLET ORAL at 08:42

## 2020-07-09 RX ADMIN — LISINOPRIL 40 MG: 40 TABLET ORAL at 08:42

## 2020-07-09 RX ADMIN — SODIUM CHLORIDE, POTASSIUM CHLORIDE, SODIUM LACTATE AND CALCIUM CHLORIDE: 600; 310; 30; 20 INJECTION, SOLUTION INTRAVENOUS at 01:56

## 2020-07-09 RX ADMIN — KETOROLAC TROMETHAMINE 15 MG: 15 INJECTION, SOLUTION INTRAMUSCULAR; INTRAVENOUS at 01:59

## 2020-07-09 RX ADMIN — FAMOTIDINE 20 MG: 10 INJECTION, SOLUTION INTRAVENOUS at 00:30

## 2020-07-09 RX ADMIN — OXYCODONE HYDROCHLORIDE 10 MG: 5 TABLET ORAL at 04:28

## 2020-07-09 RX ADMIN — OXYCODONE HYDROCHLORIDE 10 MG: 5 TABLET ORAL at 13:05

## 2020-07-09 RX ADMIN — FAMOTIDINE 20 MG: 10 INJECTION, SOLUTION INTRAVENOUS at 12:12

## 2020-07-09 RX ADMIN — SODIUM CHLORIDE, POTASSIUM CHLORIDE, SODIUM LACTATE AND CALCIUM CHLORIDE: 600; 310; 30; 20 INJECTION, SOLUTION INTRAVENOUS at 11:11

## 2020-07-09 RX ADMIN — MORPHINE SULFATE 4 MG: 2 INJECTION, SOLUTION INTRAMUSCULAR; INTRAVENOUS at 01:17

## 2020-07-09 RX ADMIN — MORPHINE SULFATE 4 MG: 2 INJECTION, SOLUTION INTRAMUSCULAR; INTRAVENOUS at 05:51

## 2020-07-09 ASSESSMENT — ACTIVITIES OF DAILY LIVING (ADL)
ADLS_ACUITY_SCORE: 10
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
## 2020-07-09 NOTE — PLAN OF CARE
VSS except HTN at 169/93, on RA, A/OX4, LS dim, hypoactive BS, no abd pain, severe back pain per pt, oxy, morphine, and tylenol given, CIWA-1,2, CLD tolerated well, discharge possible in 1-2 days.

## 2020-07-09 NOTE — PLAN OF CARE
"Vitals: VSS. BP elevated. Afebrile. C/o chronic back pain. PO oxycodone and tylenol given and effective in decreasing pain.   Cardiac: WNL. Denies SOB.   Respiratory: Lungs clear. Infrequent nonproductive cough.   Neuro: A&Ox4. CIWA of 1 and 1. Cooperative.   GI/: Denies GI s/sx currently.   Skin: [place] to BLE.   LDAs: PIV to left arm, infusing with LR at 100. Pt disconnected self from IV fluids to \"go for a walk.\" Keypad on IV pump is now locked.  Diet: Low fat  Activity: Ind  Teaching: POC reviewed with pt. Questions asked and answered.  Plan: Pain management. CIWAs. Continue wit POC.     "

## 2020-07-09 NOTE — PLAN OF CARE
VS stable. Diminished LS with non productive cough. Hypoactive BS. Complained of pain to back and pain med's given. CIWA scores of 2. Blood sugar levels of 136 and 111 .Slept on and off throughout the night.

## 2020-07-09 NOTE — DISCHARGE SUMMARY
Pt discharged to home. Transported via wife. IV removed. AVS reviewed with pt. Questions asked and answered. All belongings and AVS returned and sent with pt. Pt escorted to main entrance.

## 2021-03-19 ENCOUNTER — HOSPITAL ENCOUNTER (OUTPATIENT)
Facility: CLINIC | Age: 41
Setting detail: OBSERVATION
Discharge: HOME | End: 2021-03-20
Attending: EMERGENCY MEDICINE | Admitting: INTERNAL MEDICINE
Payer: COMMERCIAL

## 2021-03-19 DIAGNOSIS — K92.2 UPPER GI BLEED: ICD-10-CM

## 2021-03-19 DIAGNOSIS — K29.80 DUODENITIS: Primary | ICD-10-CM

## 2021-03-19 LAB
ALBUMIN SERPL-MCNC: 4.3 G/DL (ref 3.4–5)
ALP SERPL-CCNC: 101 U/L (ref 40–150)
ALT SERPL W P-5'-P-CCNC: 32 U/L (ref 0–70)
ANION GAP SERPL CALCULATED.3IONS-SCNC: 6 MMOL/L (ref 3–14)
AST SERPL W P-5'-P-CCNC: 25 U/L (ref 0–45)
BASOPHILS # BLD AUTO: 0.1 10E9/L (ref 0–0.2)
BASOPHILS NFR BLD AUTO: 0.6 %
BILIRUB DIRECT SERPL-MCNC: 0.2 MG/DL (ref 0–0.2)
BILIRUB SERPL-MCNC: 0.7 MG/DL (ref 0.2–1.3)
BUN SERPL-MCNC: 9 MG/DL (ref 7–30)
CALCIUM SERPL-MCNC: 9.3 MG/DL (ref 8.5–10.1)
CHLORIDE SERPL-SCNC: 104 MMOL/L (ref 94–109)
CO2 SERPL-SCNC: 30 MMOL/L (ref 20–32)
CREAT SERPL-MCNC: 1 MG/DL (ref 0.66–1.25)
DIFFERENTIAL METHOD BLD: NORMAL
EOSINOPHIL # BLD AUTO: 0.2 10E9/L (ref 0–0.7)
EOSINOPHIL NFR BLD AUTO: 2.1 %
ERYTHROCYTE [DISTWIDTH] IN BLOOD BY AUTOMATED COUNT: 13.8 % (ref 10–15)
GFR SERPL CREATININE-BSD FRML MDRD: >90 ML/MIN/{1.73_M2}
GLUCOSE SERPL-MCNC: 78 MG/DL (ref 70–99)
HCT VFR BLD AUTO: 47.6 % (ref 40–53)
HGB BLD-MCNC: 15.4 G/DL (ref 13.3–17.7)
IMM GRANULOCYTES # BLD: 0 10E9/L (ref 0–0.4)
IMM GRANULOCYTES NFR BLD: 0.1 %
INR PPP: 0.98 (ref 0.86–1.14)
LABORATORY COMMENT REPORT: NORMAL
LYMPHOCYTES # BLD AUTO: 4.2 10E9/L (ref 0.8–5.3)
LYMPHOCYTES NFR BLD AUTO: 42.9 %
MCH RBC QN AUTO: 30.7 PG (ref 26.5–33)
MCHC RBC AUTO-ENTMCNC: 32.4 G/DL (ref 31.5–36.5)
MCV RBC AUTO: 95 FL (ref 78–100)
MONOCYTES # BLD AUTO: 0.6 10E9/L (ref 0–1.3)
MONOCYTES NFR BLD AUTO: 6.3 %
NEUTROPHILS # BLD AUTO: 4.7 10E9/L (ref 1.6–8.3)
NEUTROPHILS NFR BLD AUTO: 48 %
NRBC # BLD AUTO: 0 10*3/UL
NRBC BLD AUTO-RTO: 0 /100
PLATELET # BLD AUTO: 296 10E9/L (ref 150–450)
POTASSIUM SERPL-SCNC: 4.1 MMOL/L (ref 3.4–5.3)
PROT SERPL-MCNC: 7.3 G/DL (ref 6.8–8.8)
RBC # BLD AUTO: 5.01 10E12/L (ref 4.4–5.9)
SARS-COV-2 RNA RESP QL NAA+PROBE: NEGATIVE
SODIUM SERPL-SCNC: 140 MMOL/L (ref 133–144)
SPECIMEN SOURCE: NORMAL
WBC # BLD AUTO: 9.7 10E9/L (ref 4–11)

## 2021-03-19 PROCEDURE — C9803 HOPD COVID-19 SPEC COLLECT: HCPCS

## 2021-03-19 PROCEDURE — 99285 EMERGENCY DEPT VISIT HI MDM: CPT | Mod: 25

## 2021-03-19 PROCEDURE — 85025 COMPLETE CBC W/AUTO DIFF WBC: CPT | Performed by: EMERGENCY MEDICINE

## 2021-03-19 PROCEDURE — 87635 SARS-COV-2 COVID-19 AMP PRB: CPT | Performed by: EMERGENCY MEDICINE

## 2021-03-19 PROCEDURE — 80048 BASIC METABOLIC PNL TOTAL CA: CPT | Performed by: EMERGENCY MEDICINE

## 2021-03-19 PROCEDURE — 96365 THER/PROPH/DIAG IV INF INIT: CPT

## 2021-03-19 PROCEDURE — 99220 PR INITIAL OBSERVATION CARE,LEVEL III: CPT | Performed by: PHYSICIAN ASSISTANT

## 2021-03-19 PROCEDURE — 258N000003 HC RX IP 258 OP 636: Performed by: EMERGENCY MEDICINE

## 2021-03-19 PROCEDURE — G0378 HOSPITAL OBSERVATION PER HR: HCPCS

## 2021-03-19 PROCEDURE — 80076 HEPATIC FUNCTION PANEL: CPT | Performed by: EMERGENCY MEDICINE

## 2021-03-19 PROCEDURE — 96366 THER/PROPH/DIAG IV INF ADDON: CPT

## 2021-03-19 PROCEDURE — 250N000011 HC RX IP 250 OP 636: Performed by: EMERGENCY MEDICINE

## 2021-03-19 PROCEDURE — 96375 TX/PRO/DX INJ NEW DRUG ADDON: CPT

## 2021-03-19 PROCEDURE — 250N000013 HC RX MED GY IP 250 OP 250 PS 637: Performed by: EMERGENCY MEDICINE

## 2021-03-19 PROCEDURE — 85610 PROTHROMBIN TIME: CPT | Performed by: EMERGENCY MEDICINE

## 2021-03-19 PROCEDURE — C9113 INJ PANTOPRAZOLE SODIUM, VIA: HCPCS | Performed by: EMERGENCY MEDICINE

## 2021-03-19 PROCEDURE — 96376 TX/PRO/DX INJ SAME DRUG ADON: CPT

## 2021-03-19 PROCEDURE — 250N000009 HC RX 250: Performed by: EMERGENCY MEDICINE

## 2021-03-19 RX ORDER — MORPHINE SULFATE 4 MG/ML
4 INJECTION, SOLUTION INTRAMUSCULAR; INTRAVENOUS ONCE
Status: COMPLETED | OUTPATIENT
Start: 2021-03-19 | End: 2021-03-19

## 2021-03-19 RX ADMIN — PANTOPRAZOLE SODIUM 80 MG: 40 INJECTION, POWDER, FOR SOLUTION INTRAVENOUS at 20:44

## 2021-03-19 RX ADMIN — LIDOCAINE HYDROCHLORIDE 30 ML: 20 SOLUTION ORAL; TOPICAL at 22:35

## 2021-03-19 RX ADMIN — MORPHINE SULFATE 4 MG: 4 INJECTION INTRAVENOUS at 22:32

## 2021-03-19 RX ADMIN — SODIUM CHLORIDE 8 MG/HR: 9 INJECTION, SOLUTION INTRAVENOUS at 21:17

## 2021-03-19 ASSESSMENT — MIFFLIN-ST. JEOR: SCORE: 2100.16

## 2021-03-19 ASSESSMENT — ENCOUNTER SYMPTOMS
BLOOD IN STOOL: 1
ABDOMINAL PAIN: 1

## 2021-03-19 NOTE — ED TRIAGE NOTES
Patient presents with tarry, black stools for the past 1 week with intermittent epigastric abdominal pain. Patient went to  and was positive for blood in stool so was sent here.

## 2021-03-20 VITALS
HEART RATE: 68 BPM | OXYGEN SATURATION: 96 % | TEMPERATURE: 97.2 F | SYSTOLIC BLOOD PRESSURE: 140 MMHG | RESPIRATION RATE: 16 BRPM | DIASTOLIC BLOOD PRESSURE: 82 MMHG | BODY MASS INDEX: 35.74 KG/M2 | HEIGHT: 71 IN | WEIGHT: 255.3 LBS

## 2021-03-20 LAB
ABO + RH BLD: NORMAL
ABO + RH BLD: NORMAL
BLD GP AB SCN SERPL QL: NORMAL
BLOOD BANK CMNT PATIENT-IMP: NORMAL
GLUCOSE BLDC GLUCOMTR-MCNC: 85 MG/DL (ref 70–99)
GLUCOSE BLDC GLUCOMTR-MCNC: 86 MG/DL (ref 70–99)
GLUCOSE BLDC GLUCOMTR-MCNC: 91 MG/DL (ref 70–99)
GLUCOSE BLDC GLUCOMTR-MCNC: 97 MG/DL (ref 70–99)
GLUCOSE SERPL-MCNC: 78 MG/DL (ref 70–99)
HGB BLD-MCNC: 15.1 G/DL (ref 13.3–17.7)
HGB BLD-MCNC: 15.6 G/DL (ref 13.3–17.7)
SPECIMEN EXP DATE BLD: NORMAL
UPPER GI ENDOSCOPY: NORMAL

## 2021-03-20 PROCEDURE — 43235 EGD DIAGNOSTIC BRUSH WASH: CPT | Performed by: INTERNAL MEDICINE

## 2021-03-20 PROCEDURE — 250N000011 HC RX IP 250 OP 636: Performed by: EMERGENCY MEDICINE

## 2021-03-20 PROCEDURE — 250N000009 HC RX 250: Performed by: INTERNAL MEDICINE

## 2021-03-20 PROCEDURE — 36415 COLL VENOUS BLD VENIPUNCTURE: CPT | Performed by: PHYSICIAN ASSISTANT

## 2021-03-20 PROCEDURE — 96376 TX/PRO/DX INJ SAME DRUG ADON: CPT | Mod: 59

## 2021-03-20 PROCEDURE — 86850 RBC ANTIBODY SCREEN: CPT | Performed by: PHYSICIAN ASSISTANT

## 2021-03-20 PROCEDURE — 86901 BLOOD TYPING SEROLOGIC RH(D): CPT | Performed by: PHYSICIAN ASSISTANT

## 2021-03-20 PROCEDURE — 82947 ASSAY GLUCOSE BLOOD QUANT: CPT | Performed by: HOSPITALIST

## 2021-03-20 PROCEDURE — 258N000003 HC RX IP 258 OP 636: Performed by: EMERGENCY MEDICINE

## 2021-03-20 PROCEDURE — C9113 INJ PANTOPRAZOLE SODIUM, VIA: HCPCS | Performed by: EMERGENCY MEDICINE

## 2021-03-20 PROCEDURE — G0500 MOD SEDAT ENDO SERVICE >5YRS: HCPCS | Performed by: INTERNAL MEDICINE

## 2021-03-20 PROCEDURE — G0378 HOSPITAL OBSERVATION PER HR: HCPCS

## 2021-03-20 PROCEDURE — 82947 ASSAY GLUCOSE BLOOD QUANT: CPT | Performed by: PHYSICIAN ASSISTANT

## 2021-03-20 PROCEDURE — 88305 TISSUE EXAM BY PATHOLOGIST: CPT | Mod: 26 | Performed by: PATHOLOGY

## 2021-03-20 PROCEDURE — 99153 MOD SED SAME PHYS/QHP EA: CPT | Performed by: INTERNAL MEDICINE

## 2021-03-20 PROCEDURE — 250N000013 HC RX MED GY IP 250 OP 250 PS 637: Performed by: PHYSICIAN ASSISTANT

## 2021-03-20 PROCEDURE — 99217 PR OBSERVATION CARE DISCHARGE: CPT | Performed by: PHYSICIAN ASSISTANT

## 2021-03-20 PROCEDURE — 258N000003 HC RX IP 258 OP 636: Performed by: PHYSICIAN ASSISTANT

## 2021-03-20 PROCEDURE — 86900 BLOOD TYPING SEROLOGIC ABO: CPT | Performed by: PHYSICIAN ASSISTANT

## 2021-03-20 PROCEDURE — 250N000011 HC RX IP 250 OP 636: Performed by: PHYSICIAN ASSISTANT

## 2021-03-20 PROCEDURE — 96375 TX/PRO/DX INJ NEW DRUG ADDON: CPT

## 2021-03-20 PROCEDURE — 88305 TISSUE EXAM BY PATHOLOGIST: CPT | Mod: TC | Performed by: INTERNAL MEDICINE

## 2021-03-20 PROCEDURE — 999N001017 HC STATISTIC GLUCOSE BY METER IP

## 2021-03-20 PROCEDURE — 250N000011 HC RX IP 250 OP 636: Performed by: INTERNAL MEDICINE

## 2021-03-20 PROCEDURE — 85018 HEMOGLOBIN: CPT | Performed by: PHYSICIAN ASSISTANT

## 2021-03-20 RX ORDER — FLUTICASONE PROPIONATE 110 UG/1
1 AEROSOL, METERED RESPIRATORY (INHALATION) 2 TIMES DAILY
COMMUNITY
End: 2024-09-25

## 2021-03-20 RX ORDER — OXYCODONE HYDROCHLORIDE 5 MG/1
5-10 TABLET ORAL EVERY 4 HOURS PRN
Status: DISCONTINUED | OUTPATIENT
Start: 2021-03-20 | End: 2021-03-20 | Stop reason: HOSPADM

## 2021-03-20 RX ORDER — NICOTINE POLACRILEX 4 MG
15-30 LOZENGE BUCCAL
Status: DISCONTINUED | OUTPATIENT
Start: 2021-03-20 | End: 2021-03-20 | Stop reason: HOSPADM

## 2021-03-20 RX ORDER — PROCHLORPERAZINE 25 MG
25 SUPPOSITORY, RECTAL RECTAL EVERY 12 HOURS PRN
Status: DISCONTINUED | OUTPATIENT
Start: 2021-03-20 | End: 2021-03-20 | Stop reason: HOSPADM

## 2021-03-20 RX ORDER — ONDANSETRON 2 MG/ML
4 INJECTION INTRAMUSCULAR; INTRAVENOUS EVERY 6 HOURS PRN
Status: DISCONTINUED | OUTPATIENT
Start: 2021-03-20 | End: 2021-03-20 | Stop reason: HOSPADM

## 2021-03-20 RX ORDER — LIDOCAINE 40 MG/G
CREAM TOPICAL
Status: DISCONTINUED | OUTPATIENT
Start: 2021-03-20 | End: 2021-03-20 | Stop reason: HOSPADM

## 2021-03-20 RX ORDER — NALOXONE HYDROCHLORIDE 0.4 MG/ML
0.2 INJECTION, SOLUTION INTRAMUSCULAR; INTRAVENOUS; SUBCUTANEOUS
Status: DISCONTINUED | OUTPATIENT
Start: 2021-03-20 | End: 2021-03-20 | Stop reason: HOSPADM

## 2021-03-20 RX ORDER — DIAZEPAM 5 MG
5 TABLET ORAL EVERY 8 HOURS PRN
COMMUNITY
End: 2024-09-25

## 2021-03-20 RX ORDER — TIZANIDINE 2 MG/1
4-6 TABLET ORAL AT BEDTIME
COMMUNITY

## 2021-03-20 RX ORDER — ALBUTEROL SULFATE 90 UG/1
1-2 AEROSOL, METERED RESPIRATORY (INHALATION) EVERY 6 HOURS PRN
COMMUNITY

## 2021-03-20 RX ORDER — ACETAMINOPHEN 325 MG/1
650 TABLET ORAL EVERY 4 HOURS PRN
Status: DISCONTINUED | OUTPATIENT
Start: 2021-03-20 | End: 2021-03-20 | Stop reason: HOSPADM

## 2021-03-20 RX ORDER — PROCHLORPERAZINE MALEATE 5 MG
10 TABLET ORAL EVERY 6 HOURS PRN
Status: DISCONTINUED | OUTPATIENT
Start: 2021-03-20 | End: 2021-03-20 | Stop reason: HOSPADM

## 2021-03-20 RX ORDER — HYDROMORPHONE HYDROCHLORIDE 1 MG/ML
.3-.5 INJECTION, SOLUTION INTRAMUSCULAR; INTRAVENOUS; SUBCUTANEOUS
Status: DISCONTINUED | OUTPATIENT
Start: 2021-03-20 | End: 2021-03-20 | Stop reason: HOSPADM

## 2021-03-20 RX ORDER — NALOXONE HYDROCHLORIDE 0.4 MG/ML
0.4 INJECTION, SOLUTION INTRAMUSCULAR; INTRAVENOUS; SUBCUTANEOUS
Status: DISCONTINUED | OUTPATIENT
Start: 2021-03-20 | End: 2021-03-20 | Stop reason: HOSPADM

## 2021-03-20 RX ORDER — SODIUM CHLORIDE 9 MG/ML
INJECTION, SOLUTION INTRAVENOUS CONTINUOUS
Status: DISCONTINUED | OUTPATIENT
Start: 2021-03-20 | End: 2021-03-20 | Stop reason: HOSPADM

## 2021-03-20 RX ORDER — PANTOPRAZOLE SODIUM 40 MG/1
40 TABLET, DELAYED RELEASE ORAL DAILY
Qty: 30 TABLET | Refills: 0 | Status: SHIPPED | OUTPATIENT
Start: 2021-03-20 | End: 2021-04-19

## 2021-03-20 RX ORDER — GABAPENTIN 300 MG/1
1200 CAPSULE ORAL 3 TIMES DAILY
COMMUNITY

## 2021-03-20 RX ORDER — DEXTROSE MONOHYDRATE 25 G/50ML
25-50 INJECTION, SOLUTION INTRAVENOUS
Status: DISCONTINUED | OUTPATIENT
Start: 2021-03-20 | End: 2021-03-20 | Stop reason: HOSPADM

## 2021-03-20 RX ORDER — ONDANSETRON 4 MG/1
4 TABLET, ORALLY DISINTEGRATING ORAL EVERY 6 HOURS PRN
Status: DISCONTINUED | OUTPATIENT
Start: 2021-03-20 | End: 2021-03-20 | Stop reason: HOSPADM

## 2021-03-20 RX ORDER — FENTANYL CITRATE 50 UG/ML
INJECTION, SOLUTION INTRAMUSCULAR; INTRAVENOUS PRN
Status: DISCONTINUED | OUTPATIENT
Start: 2021-03-20 | End: 2021-03-20 | Stop reason: HOSPADM

## 2021-03-20 RX ORDER — FLUMAZENIL 0.1 MG/ML
0.2 INJECTION, SOLUTION INTRAVENOUS
Status: DISCONTINUED | OUTPATIENT
Start: 2021-03-20 | End: 2021-03-20 | Stop reason: HOSPADM

## 2021-03-20 RX ADMIN — OXYCODONE HYDROCHLORIDE 10 MG: 5 TABLET ORAL at 00:43

## 2021-03-20 RX ADMIN — MIDAZOLAM 1 MG: 1 INJECTION INTRAMUSCULAR; INTRAVENOUS at 10:13

## 2021-03-20 RX ADMIN — SODIUM CHLORIDE: 9 INJECTION, SOLUTION INTRAVENOUS at 01:29

## 2021-03-20 RX ADMIN — TOPICAL ANESTHETIC 1 SPRAY: 200 SPRAY DENTAL; PERIODONTAL at 09:59

## 2021-03-20 RX ADMIN — FENTANYL CITRATE 50 MCG: 50 INJECTION, SOLUTION INTRAMUSCULAR; INTRAVENOUS at 10:06

## 2021-03-20 RX ADMIN — ACETAMINOPHEN 650 MG: 325 TABLET, FILM COATED ORAL at 00:43

## 2021-03-20 RX ADMIN — MIDAZOLAM 1 MG: 1 INJECTION INTRAMUSCULAR; INTRAVENOUS at 10:02

## 2021-03-20 RX ADMIN — FENTANYL CITRATE 50 MCG: 50 INJECTION, SOLUTION INTRAMUSCULAR; INTRAVENOUS at 10:10

## 2021-03-20 RX ADMIN — FENTANYL CITRATE 50 MCG: 50 INJECTION, SOLUTION INTRAMUSCULAR; INTRAVENOUS at 10:01

## 2021-03-20 RX ADMIN — FENTANYL CITRATE 50 MCG: 50 INJECTION, SOLUTION INTRAMUSCULAR; INTRAVENOUS at 09:59

## 2021-03-20 RX ADMIN — OXYCODONE HYDROCHLORIDE 10 MG: 5 TABLET ORAL at 06:13

## 2021-03-20 RX ADMIN — ACETAMINOPHEN 650 MG: 325 TABLET, FILM COATED ORAL at 06:13

## 2021-03-20 RX ADMIN — SIMETHICONE 2 ML: 63.3; 3.7 SOLUTION/ DROPS ORAL at 10:20

## 2021-03-20 RX ADMIN — MIDAZOLAM 1 MG: 1 INJECTION INTRAMUSCULAR; INTRAVENOUS at 10:05

## 2021-03-20 RX ADMIN — MIDAZOLAM 2 MG: 1 INJECTION INTRAMUSCULAR; INTRAVENOUS at 09:59

## 2021-03-20 RX ADMIN — ONDANSETRON 4 MG: 2 INJECTION INTRAMUSCULAR; INTRAVENOUS at 06:54

## 2021-03-20 RX ADMIN — HYDROMORPHONE HYDROCHLORIDE 0.3 MG: 1 INJECTION, SOLUTION INTRAMUSCULAR; INTRAVENOUS; SUBCUTANEOUS at 03:06

## 2021-03-20 RX ADMIN — SODIUM CHLORIDE 8 MG/HR: 9 INJECTION, SOLUTION INTRAVENOUS at 07:10
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
## 2021-03-20 NOTE — PLAN OF CARE
Patient's After Visit Summary was reviewed with patient.  Patient verbalized understanding of After Visit Summary, recommended follow up and was given an opportunity to ask questions.   Discharge medications sent home with family. Pt to  medications at preferred pharmacy.   Discharged with family, picking up at front door.
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
## 2021-03-20 NOTE — PLAN OF CARE
PRIMARY DIAGNOSIS: UPPER GI BLEED    OUTPATIENT/OBSERVATION GOALS TO BE MET BEFORE DISCHARGE  Orthostatic performed: No    Stable Hgb Yes.   Recent Labs   Lab Test 03/19/21 2001 07/09/20  0708 07/08/20  0645   HGB 15.4 13.0* 12.8*       Resolved or declined bleeding episodes: Yes Last episode: Urgent Care 3/19/21    Appropriate testing complete: Yes    Cleared for discharge by consultants (if involved): No    Safe discharge environment identified: Yes    Vitals are Temp: 97.7  F (36.5  C) Temp src: Oral BP: (!) 145/83 Pulse: 84   Resp: 16 SpO2: 98 %.  Patient is Alert and Oriented x4. He is independent with no assistive devices .  Pt is a NPO diet. He is complaining of 6/10 pain in his RUQ. Tylenol and Oxycodone given for pain. Medications decreased pain. Patient has Normal Saline 0.9% running at 100 mL per hour. Will Continue to monitor.    Discharge Planner Nurse   Safe discharge environment identified: Yes  Barriers to discharge: Yes       Entered by: Manuela Ruiz 03/20/2021 2:04 AM     Please review provider order for any additional goals.   Nurse to notify provider when observation goals have been met and patient is ready for discharge.

## 2021-03-20 NOTE — ED NOTES
[facility]  ED Nurse Handoff Report    Ozzie Olmedo is a 40 year old male   ED Chief complaint: Melena  . ED Diagnosis:   Final diagnoses:   Upper GI bleed     Allergies:   Allergies   Allergen Reactions     No Known Drug Allergies      Semaglutide Other (See Comments)     Caused Pancreatitis        Code Status: Full Code  Activity level - Baseline/Home:  Independent. Activity Level - Current:   Independent. Lift room needed: No. Bariatric: No   Needed: No   Isolation: No. Infection: Not Applicable.   Vital Signs:   Vitals:    03/19/21 1743   BP: 131/87   Pulse: 76   Resp: 18   Temp: 97.9  F (36.6  C)   TempSrc: Temporal   SpO2: 99%     Cardiac Rhythm:  ,    NONE  Pain level:    Patient confused: No. Patient Falls Risk: No.   Elimination Status: Has voided   Patient Report / Focused Assessment:    Gastrointestinal - Gastrointestinal Comment: PT COMES IN WITH + GUIAC FROM  AFTER 1 WEEK OF BLACK AND TARRY STOOL. BILAT SHOULDER PAIN AND RIGHT UPPER QUAD PAIN   Tests Performed / Abnormal Results:    No orders to display     Labs Ordered and Resulted from Time of ED Arrival Up to the Time of Departure from the ED   CBC WITH PLATELETS DIFFERENTIAL   BASIC METABOLIC PANEL   HEPATIC PANEL   INR   SARS-COV-2 (COVID-19) VIRUS RT-PCR   PERIPHERAL IV CATHETER   Treatments provided: piv, labs, MEDS, COVID, MEDS  Family Comments: none  OBS brochure/video discussed/provided to patient:  YES  ED Medications:   Medications   pantoprazole (PROTONIX) 80 mg in sodium chloride 0.9 % 100 mL infusion (0 mg/hr Intravenous ED Infusing on Admission/transfer 3/19/21 2208)   pantoprazole (PROTONIX) IV push injection 80 mg (80 mg Intravenous Given 3/19/21 2044)   morphine (PF) injection 4 mg (4 mg Intravenous Given 3/19/21 2232)   lidocaine (XYLOCAINE) 2 % 15 mL, alum & mag hydroxide-simethicone (MAALOX) 15 mL GI Cocktail (30 mLs Oral Given 3/19/21 2235)     Drips infusing:  Yes  For the majority of the shift, the  patient's behavior Green. Interventions performed were none.    Sepsis treatment initiated: No     Patient tested for COVID 19 prior to admission: YES    ED Nurse Name/Phone Number: Nancy Galvez RN, [phone]  10:03 PM    RECEIVING UNIT ED HANDOFF REVIEW    Above ED Nurse Handoff Report was reviewed: Yes  Reviewed by: Manuela Ruiz RN on March 19, 2021 at 10:35 PM

## 2021-03-20 NOTE — ED PROVIDER NOTES
History   Chief Complaint:  Melena     HPI   Ozzie Olmedo is a 40 year old male with history of diabetes mellitus, hyperlipidemia, and hypertension who presents with melena. Patient reports he has been having black, tarry stools for about a week with intermittent nonrad moderate epigastric abdominal pain. The abdominal pain occurred after eating. He also experienced shortness of breath with the abdominal pain episode. He has had about 1 episode of dark stool daily. He was seen at  and was positive for blood in stool along with workup below. Denies chest pain, cough or fever. Denies taking iron supplements or pepto bismol.     Laboratory (3/18/21):  Hgb A1C: 6.1 (H)  Lipase: 48  BMP: WNL (creatinine 0.98)  Liver panel: WNL  CBC: WBC 7.9, HGB 15.0,     Review of Systems   Gastrointestinal: Positive for abdominal pain and blood in stool.   All other systems reviewed and are negative.    Allergies:  Semaglutide    Medications:  Atorvastatin  Flexeril  Metformin  Naprosyn  Gabapentin  Voltaren  Roxicodone  Valium  Zanaflex    Past Medical History:    Diabetes mellitus  Hyperlipidemia   Hypertension   Osteochondrosis of spine  Lumbago    Past Surgical History:    Cardiac ablation  Laminectomy lumbar two levels  Hammer toe repair  Ulnar collateral ligament repair     Family History:    Father- heart disease    Social History:  Presents with wife  Smokes 1 pack a day      Physical Exam     Patient Vitals for the past 24 hrs:   BP Temp Temp src Pulse Resp SpO2   03/19/21 1743 131/87 97.9  F (36.6  C) Temporal 76 18 99 %       Physical Exam  Constitutional: Well developed, nontox appearance  Head: Atraumatic.   Neck:  no stridor  Eyes: no scleral icterus  Cardiovascular: RRR, 2+ bilat radial pulses  Pulmonary/Chest: nml resp effort  Abdominal: ND, soft, epigastric tenderness, no rebound or guarding   Ext: Warm, well perfused, no edema  Neurological: A&O, symmetric facies, moves ext x4  Skin: Skin is warm  and dry.   Psychiatric: Behavior is normal. Thought content normal.   Nursing note and vitals reviewed.    Emergency Department Course     Laboratory:   CBC: WBC 9.7, HGB 15.4,   BMP: WNL (Creatinine 1.00)       Hepatic panel: WNL    INR: 0.98    Asymptomatic COVID19 Virus PCR by nasopharyngeal swab pending    Emergency Department Course:    Reviewed:  I reviewed nursing notes, vitals, past medical history and care everywhere    Assessments:   I obtained history and examined the patient as noted above. Findings and plan were discussed with the patient at this time.     Consults:    I spoke with Brittany Fenton of the Hospitalist service for Dr. Hall.     Interventions:  : Protonix 80 mg IV Infusion   : Protonix 80 mg in NaCl 100 mL IV Infusion     Disposition:  The patient was admitted to the hospital under the care of Dr. Hall.     Impression & Plan       Medical Decision Makin year old male presenting w/ black stool     DDx includes gastritis, gastric ulcer, duodenal ulcer, acute anemia, coagulopathy,upper GI bleed NOS.  Doubt brisk upper GI bleed or lower GI bleed given history and physical exam.  Labs significant for no acute abnormality.  Imaging deferred given no vascular catastrophe or evidence of brisk GI bleed.  PPI bolus and infusion ordered as noted above for treatment.  Given persistent tarry stool and guaiac positive on outpatient labs, the patient was admitted to the hospital service under observation for further evaluation and management.  Pt counseled on all results, disposition and diagnosis.  They are understanding and agreeable to plan. Patient admitted in guarde condition.     Covid-19  Ozzie Olmedo was evaluated during a global COVID-19 pandemic, which necessitated consideration that the patient might be at risk for infection with the SARS-CoV-2 virus that causes COVID-19.   Applicable protocols for evaluation were followed during the patient's care.   COVID-19 was  considered as part of the patient's evaluation. The plan for testing is:  a test was obtained during this visit.    Diagnosis:    ICD-10-CM    1. Upper GI bleed  K92.2        Scribe Disclosure:  Rosalind GONZALES, am serving as a scribe at 8:14 PM on 3/19/2021 to document services personally performed by Jerome Randle MD based on my observations and the provider's statements to me.             Jerome Randle MD  03/20/21 1152

## 2021-03-20 NOTE — PLAN OF CARE
PRIMARY DIAGNOSIS: UPPER GI BLEED    OUTPATIENT/OBSERVATION GOALS TO BE MET BEFORE DISCHARGE  1. Orthostatic performed: No    2. Stable Hgb Yes.   Recent Labs   Lab Test 03/19/21 2001 07/09/20  0708 07/08/20  0645   HGB 15.4 13.0* 12.8*       3. Resolved or declined bleeding episodes: Yes Last episode: Urgent Care 3/19/21    4. Appropriate testing complete: Yes    5. Cleared for discharge by consultants (if involved): No    6. Safe discharge environment identified: Yes    Vitals are Temp: 97.6  F (36.4  C) Temp src: Oral BP: (!) 144/82 Pulse: 66   Resp: 16 SpO2: 97 %.    Patient is Alert and Oriented x4. He is independent with no assistive devices. Pt is a NPO diet. He is complaining of 6/10 pain in his RUQ. IV dilaudid given for pain. Medications decreased pain. Patient has Normal Saline 0.9% running at 100 mL per hour. Plan is for GI consult in the morning.     Discharge Planner Nurse   Safe discharge environment identified: Yes  Barriers to discharge: Yes       Entered by: Manuela Ruiz 03/20/2021 4:30 AM     Please review provider order for any additional goals.   Nurse to notify provider when observation goals have been met and patient is ready for discharge.

## 2021-03-20 NOTE — PROGRESS NOTES
ROOM # 220    Living Situation (if not independent, order SW consult): home with wife   Facility name:  : [facility] [phone]    Activity level at baseline: Indpt  Activity level on admit: Indpt.      Patient registered to observation; given Patient Bill of Rights; given the opportunity to ask questions about observation status and their plan of care.  Patient has been oriented to the observation room, bathroom and call light is in place.    Discussed discharge goals and expectations with patient/family.
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
## 2021-03-20 NOTE — DISCHARGE SUMMARY
[facility] Outpatient / Observation Unit  Discharge Summary        Ozzie Olmedo MRN# [id number]   YOB: 1980 Age: 40 year old     Date of Admission:  3/19/2021  Date of Discharge:  3/20/2021  Admitting Physician:  Terry Hall MD  Discharge Physician: Katie Turner PA-C, [place]  Discharging Service: Hospitalist      Primary Provider: Bridget Lange  Primary Care Physician Phone Number: [phone]         Primary Discharge Diagnoses:    Ozzie Olmedo was admitted on 3/19/2021 for concerns of nausea, vomiting and diarrhea. Ozzie Olmedo is a 40 year old male with a history of Pancreatitis thought due to Ozempic vs Etoh, HTN, HLD, DM Type 2, Tobacco Use who presented to the ED 3/19/21 due to melena and abdominal pain. He reports about 1 week for epigastric/RUQ abdominal pain and black/tarry stool. He had been seen by his PCP earlier this week and had labs and RUQ US that were both unremarkable. He then presented to  on the day of admission and was noted to have occult positive stool, so he was sent to the ER. He was monitored overnight with no signs of overt bleeding.      Abdominal Pain  GI Bleed - hgb has remained stable this admission (15.4 on day of discharge.   - GI was consulted and patient underwent an EGD that showed mild duodenitis and hiatal hernia. No ulceration noted.   - GI recommends PPI daily  - outpatient follow up with PCP and GI  - patient advised to avoid NSAIDs     HTN - patient to resume PTA Lisinopril on discharge.     HLD - resume PTA Lipitor on discharge      DM Type 2 - resume PTA metformin and lantus on discharge      Tobacco Abuse - smokes 1ppd.  No hx of COPD        Secondary Discharge Diagnoses:     Past Medical History:   Diagnosis Date     Diabetes (H)     type II     Dyslipidemia      Hyperlipemia      Hypertension      Juvenile osteochondrosis of spine 1997     Other chronic pain     recurrent low back pain     Overweight      Right shoulder pain             Code  Status:      Full Code        Brief Hospital Summary:        Reason for your hospital stay      You were registered to the observation unit for melena (black stools) and   abdominal discomfort for 1 week. Your lab evaluation was unremarkable and   your hemoglobin was stable. You were started on IV protonix and   gastroenterology was consulted. You underwent an upper endoscopy (EGD) and   were found to have mild duodenitis and a hiatal hernia. You will be   discharged on Protonix for a least the next 30 days.         Please refer to initial admission history and physical for further details.   Briefly, Ozzie Olmedo was admitted on 3/19/2021 for concerns of abdominal pain and melena. Work up in ED did not show any evidence of overt bleeding or severe anemia. Pt was registered to the Observation Unit for continued supportive therapy and further evaluation.     Pt was resuscitated with IV fluids and continued on supportive measures including anti-emetics and pain control as needed. Serial hgb was obtained with no change. GI was consulted and an EGD was performed. Labs were reviewed and significant results addressed.  On the day of discharge, symptoms were resolving and pt was able to tolerate PO intake. Vitals were stable, without evidence of orthostasis. Medications were reviewed and adjustments made as necessary. Pt is instructed to follow up as below.            Significant Lab During Hospitalization:      No results for input(s): PH, PHV, PO2, PO2V, SAT, PCO2, PCO2V, HCO3, HCO3V in the last 168 hours.  Recent Labs   Lab 03/20/21  1115 03/20/21  0603 03/19/21 2001   WBC  --   --  9.7   HGB 15.6 15.1 15.4   HCT  --   --  47.6   MCV  --   --  95   PLT  --   --  296          Lab Results   Component Value Date     03/19/2021     07/09/2020     07/08/2020    Lab Results   Component Value Date    CHLORIDE 104 03/19/2021    CHLORIDE 102 07/09/2020    CHLORIDE 100 07/08/2020    Lab Results   Component  Value Date    BUN 9 03/19/2021    BUN 12 07/09/2020    BUN 12 07/08/2020      Lab Results   Component Value Date    POTASSIUM 4.1 03/19/2021    POTASSIUM 3.4 07/09/2020    POTASSIUM 3.7 07/08/2020    Lab Results   Component Value Date    CO2 30 03/19/2021    CO2 29 07/09/2020    CO2 29 07/08/2020    Lab Results   Component Value Date    CR 1.00 03/19/2021    CR 0.82 07/09/2020    CR 0.81 07/08/2020        No results for input(s): CULT in the last 168 hours.  Recent Labs   Lab 03/20/21  0603 03/19/21 2001   NA  --  140   POTASSIUM  --  4.1   CHLORIDE  --  104   CO2  --  30   ANIONGAP  --  6   GLC 78 78   BUN  --  9   CR  --  1.00   GFRESTIMATED  --  >90   GFRESTBLACK  --  >90   SYLEVSTER  --  9.3   PROTTOTAL  --  7.3   ALBUMIN  --  4.3   BILITOTAL  --  0.7   ALKPHOS  --  101   AST  --  25   ALT  --  32     Recent Labs   Lab 03/19/21 2001   INR 0.98     No results for input(s): LACT in the last 168 hours.  No results for input(s): LIPASE in the last 168 hours.  No results for input(s): TSH in the last 168 hours.  No results for input(s): TROPONIN, TROPI, TROPR in the last 168 hours.    Invalid input(s): TROP, TROPONINIES  No results for input(s): COLOR, APPEARANCE, URINEGLC, URINEBILI, URINEKETONE, SG, UBLD, URINEPH, PROTEIN, UROBILINOGEN, NITRITE, LEUKEST, RBCU, WBCU in the last 168 hours.             Significant Imaging During Hospitalization:      No results found for this or any previous visit (from the past 48 hour(s)).           Pending Results:        Unresulted Labs Ordered in the Past 30 Days of this Admission     Date and Time Order Name Status Description    3/20/2021 1025 Surgical pathology exam In process               Consultations This Hospital Stay:      Consultation during this admission received from gastroenterology         Discharge Instructions and Follow-Up:      Follow-up Appointments     Follow-up and recommended labs and tests       -- Follow up with your family doctor in the next 1-2 weeks for  recheck.   -- Follow up with Gastroenterology in the next 2-4 weeks for recheck.  -- Avoid medications that can irritate your stomach - no aspirin or other   NSAIDs (like ibuprofen products), limit alcohol, limit caffeine, avoid   fried/fatty, spicy and acidic foods.         Pt instructed to follow up with PCP in 7 days and with Consultant if indicated.   Follow-up Labs CBC      Discharge Disposition:      Discharged to home         Discharge Medications:        Current Discharge Medication List      START taking these medications    Details   pantoprazole (PROTONIX) 40 MG EC tablet Take 1 tablet (40 mg) by mouth daily  Qty: 30 tablet, Refills: 0    Associated Diagnoses: Upper GI bleed; Duodenitis         CONTINUE these medications which have NOT CHANGED    Details   acetaminophen (TYLENOL) 500 MG tablet Take 1,000 mg by mouth 2 times daily      atorvastatin (LIPITOR) 80 MG tablet Take 80 mg by mouth At Bedtime       diazepam (VALIUM) 5 MG tablet Take 5 mg by mouth every 8 hours as needed for anxiety      fluticasone (FLOVENT HFA) 110 MCG/ACT inhaler Inhale 1 puff into the lungs 2 times daily      gabapentin (NEURONTIN) 300 MG capsule Take 1,200 mg by mouth 3 times daily      insulin glargine (LANTUS PEN) 100 UNIT/ML pen Inject 16 Units Subcutaneous At Bedtime Tapering down lantus and eventually discontinuing    Comments: If Lantus is not covered by insurance, may substitute Basaglar at same dose and frequency.        metFORMIN (GLUCOPHAGE-XR) 500 MG 24 hr tablet Take 2,000 mg by mouth daily (with dinner)       oxyCODONE (ROXICODONE) 5 MG tablet Take 1-2 tablets (5-10 mg) by mouth every 4 hours as needed for moderate to severe pain  Qty: 20 tablet, Refills: 0    Associated Diagnoses: Postoperative state      tiZANidine (ZANAFLEX) 2 MG tablet Take 4-6 mg by mouth nightly as needed for muscle spasms And left shoulder pain      albuterol (PROAIR HFA/PROVENTIL HFA/VENTOLIN HFA) 108 (90 Base) MCG/ACT inhaler Inhale 1-2  "puffs into the lungs every 6 hours as needed for shortness of breath / dyspnea or wheezing    Comments: Pharmacy may dispense brand covered by insurance (Proair, or proventil or ventolin or generic albuterol inhaler)                 Allergies:         Allergies   Allergen Reactions     No Known Drug Allergies      Semaglutide Other (See Comments)     Caused Pancreatitis            Condition and Physical on Discharge:      Discharge condition: Stable   Vitals: Blood pressure (!) 140/82, pulse 68, temperature 97.2  F (36.2  C), temperature source Oral, resp. rate 16, height 1.803 m (5' 11\"), weight 115.8 kg (255 lb 4.8 oz), SpO2 96 %.  255 lbs 4.8 oz      GENERAL:  Comfortable.  PSYCH: pleasant, oriented, No acute distress.  HEENT:  PERRLA. Normal conjunctiva, normal hearing, nasal mucosa and Oropharynx are normal.  NECK:  Supple, no neck vein distention, adenopathy or bruits, normal thyroid.  HEART:  Normal S1, S2 with no murmur, no pericardial rub, gallops or S3 or S4.  LUNGS:  Clear to auscultation, normal Respiratory effort. No wheezing, rales or ronchi.  ABDOMEN:  Soft, no hepatosplenomegaly, normal bowel sounds. Non-tender, non distended.   EXTREMITIES:  No pedal edema, +2 pulses bilateral and equal.  SKIN:  Dry to touch, No rash, wound or ulcerations.  NEUROLOGIC:  CN 2-12 intact, BL 5/5 symmetric upper and lower extremity strength, sensation is intact with no focal deficits.     "

## 2021-03-20 NOTE — PLAN OF CARE
PRIMARY DIAGNOSIS: UPPER GI BLEED    OUTPATIENT/OBSERVATION GOALS TO BE MET BEFORE DISCHARGE  1. Orthostatic performed: No    2. Stable Hgb Yes.   Recent Labs   Lab Test 03/20/21  0603 03/19/21 2001 07/09/20  0708   HGB 15.1 15.4 13.0*       3. Resolved or declined bleeding episodes: Yes Last episode: Reported dark/ tarry stools yesterday 3/19    4. Appropriate testing complete: No- GI consulted    5. Cleared for discharge by consultants (if involved): No    6. Safe discharge environment identified: Yes    Discharge Planner Nurse   Safe discharge environment identified: Yes  Barriers to discharge: Yes       Entered by: Allyson Shane 03/20/2021 0800     A&Ox4. VSS. Reports headache, see MAR. Ice provided for relief. Up independently w/ steady gait. Denies chest pain, SOB, nausea, numbness/ tingling. NPO. GI consulted. Care team to continue to monitor and provide cares.

## 2021-03-20 NOTE — ED NOTES
Gastrointestinal - Gastrointestinal Comment: PT COMES IN WITH + GUIAC FROM UC AFTER 1 WEEK OF BLACK AND TARRY STOOL. BILAT SHOULDER PAIN AND RIGHT UPPER QUAD PAIN

## 2021-03-20 NOTE — PHARMACY-ADMISSION MEDICATION HISTORY
Admission medication history interview status for this patient is complete. See [facility] admission navigator for allergy information, prior to admission medications and immunization status.     Medication history interview done, indicate source(s): Patient  Medication history resources (including written lists, pill bottles, clinic record): SureScripts and Care Everywhere  Pharmacy: [facility] Jasmina Medina    Changes made to PTA medication list:  Added: albuterol, Flovent, gabapentin, diazepam, tizanidine  Deleted: cyclobenzaprine, fish oil, lisinopril, lisinopril-hydrochlorothiazide, naloxone, naproxen, senna-docusate  Changed: Lantus 45-50 units at bedtime --> 16 units at bedtime and tapering down to discontinue soon.     Actions taken by pharmacist (provider contacted, etc): Spoke with patient about home med list     Additional medication history information: None    Medication reconciliation/reorder completed by provider prior to medication history?  N   (Y/N)     For patients on insulin therapy:   Do you typically eat three meals a day? Yes 3 meals + 2 snacks  How many times do you check your blood glucose per day? 10+ (has Dexcom)  How many episodes of hypoglycemia do you typically have per month? Last month had about 20 episodes of BG in the 60s, hence tapering down Lantus.   Do you have a Continuous Glucose Monitor (CGM)?  Yes    Prior to Admission medications    Medication Sig Last Dose Taking? Auth Provider   acetaminophen (TYLENOL) 500 MG tablet Take 1,000 mg by mouth 2 times daily 3/19/2021 at am Yes Unknown, Entered By History   atorvastatin (LIPITOR) 80 MG tablet Take 80 mg by mouth At Bedtime  3/18/2021 at pm Yes Reported, Patient   diazepam (VALIUM) 5 MG tablet Take 5 mg by mouth every 8 hours as needed for anxiety 3/19/2021 at am Yes Unknown, Entered By History   fluticasone (FLOVENT HFA) 110 MCG/ACT inhaler Inhale 1 puff into the lungs 2 times daily Past Month at Unknown time Yes  Unknown, Entered By History   gabapentin (NEURONTIN) 300 MG capsule Take 1,200 mg by mouth 3 times daily 3/19/2021 at am Yes Unknown, Entered By History   insulin glargine (LANTUS PEN) 100 UNIT/ML pen Inject 16 Units Subcutaneous At Bedtime Tapering down lantus and eventually discontinuing 3/17/2021 at pm Yes Unknown, Entered By History   metFORMIN (GLUCOPHAGE-XR) 500 MG 24 hr tablet Take 2,000 mg by mouth daily (with dinner)  3/18/2021 at pm Yes Reported, Patient   oxyCODONE (ROXICODONE) 5 MG tablet Take 1-2 tablets (5-10 mg) by mouth every 4 hours as needed for moderate to severe pain 3/18/2021 at am Yes Светлана Rothman PA-C   tiZANidine (ZANAFLEX) 2 MG tablet Take 4-6 mg by mouth nightly as needed for muscle spasms And left shoulder pain 3/18/2021 at pm Yes Unknown, Entered By History   albuterol (PROAIR HFA/PROVENTIL HFA/VENTOLIN HFA) 108 (90 Base) MCG/ACT inhaler Inhale 1-2 puffs into the lungs every 6 hours as needed for shortness of breath / dyspnea or wheezing More than a month at Unknown time  Unknown, Entered By History

## 2021-03-20 NOTE — PLAN OF CARE
PRIMARY DIAGNOSIS: UPPER GI BLEED    OUTPATIENT/OBSERVATION GOALS TO BE MET BEFORE DISCHARGE  1. Orthostatic performed: No    2. Stable Hgb Yes.   Recent Labs   Lab Test 03/20/21  0603 03/19/21 2001 07/09/20  0708   HGB 15.1 15.4 13.0*       3. Resolved or declined bleeding episodes: Yes Last episode: Reported dark/ tarry stools yesterday 3/19    4. Appropriate testing complete: Yes - EGD completed this afternoon.    5. Cleared for discharge by consultants (if involved): Yes    6. Safe discharge environment identified: Yes    Discharge Planner Nurse   Safe discharge environment identified: Yes  Barriers to discharge: No       Entered by: Allyson Shane 03/20/2021 1200     A&Ox4. VSS. Up independently w/ steady gait. Diet advanced to regular. EGD completed this afternoon, duodenitis  revealed during exam. Discharging on oral Protonix. Plan to discharge this afternoon.

## 2021-03-23 LAB — COPATH REPORT: NORMAL

## 2024-09-25 ENCOUNTER — APPOINTMENT (OUTPATIENT)
Dept: ULTRASOUND IMAGING | Facility: CLINIC | Age: 44
End: 2024-09-25
Attending: EMERGENCY MEDICINE
Payer: COMMERCIAL

## 2024-09-25 ENCOUNTER — APPOINTMENT (OUTPATIENT)
Dept: CT IMAGING | Facility: CLINIC | Age: 44
End: 2024-09-25
Attending: EMERGENCY MEDICINE
Payer: COMMERCIAL

## 2024-09-25 ENCOUNTER — HOSPITAL ENCOUNTER (INPATIENT)
Facility: CLINIC | Age: 44
LOS: 6 days | Discharge: HOME | End: 2024-10-01
Attending: EMERGENCY MEDICINE | Admitting: INTERNAL MEDICINE
Payer: COMMERCIAL

## 2024-09-25 DIAGNOSIS — K85.20 ALCOHOL-INDUCED ACUTE PANCREATITIS WITHOUT INFECTION OR NECROSIS: ICD-10-CM

## 2024-09-25 DIAGNOSIS — L25.8 CONTACT DERMATITIS DUE TO OTHER AGENT, UNSPECIFIED CONTACT DERMATITIS TYPE: Primary | ICD-10-CM

## 2024-09-25 LAB
ALBUMIN SERPL BCG-MCNC: 4.4 G/DL (ref 3.5–5.2)
ALP SERPL-CCNC: 98 U/L (ref 40–150)
ALT SERPL W P-5'-P-CCNC: 40 U/L (ref 0–70)
ANION GAP SERPL CALCULATED.3IONS-SCNC: 16 MMOL/L (ref 7–15)
AST SERPL W P-5'-P-CCNC: 26 U/L (ref 0–45)
BASOPHILS # BLD AUTO: 0 10E3/UL (ref 0–0.2)
BASOPHILS NFR BLD AUTO: 1 %
BILIRUB SERPL-MCNC: 0.8 MG/DL
BUN SERPL-MCNC: 11 MG/DL (ref 6–20)
CALCIUM SERPL-MCNC: 8.9 MG/DL (ref 8.8–10.4)
CHLORIDE SERPL-SCNC: 98 MMOL/L (ref 98–107)
CREAT SERPL-MCNC: 0.94 MG/DL (ref 0.67–1.17)
EGFRCR SERPLBLD CKD-EPI 2021: >90 ML/MIN/1.73M2
EOSINOPHIL # BLD AUTO: 0.1 10E3/UL (ref 0–0.7)
EOSINOPHIL NFR BLD AUTO: 1 %
ERYTHROCYTE [DISTWIDTH] IN BLOOD BY AUTOMATED COUNT: 12.4 % (ref 10–15)
GLUCOSE BLDC GLUCOMTR-MCNC: 125 MG/DL (ref 70–99)
GLUCOSE SERPL-MCNC: 138 MG/DL (ref 70–99)
HCO3 SERPL-SCNC: 23 MMOL/L (ref 22–29)
HCT VFR BLD AUTO: 43.7 % (ref 40–53)
HGB BLD-MCNC: 15.2 G/DL (ref 13.3–17.7)
HOLD SPECIMEN: NORMAL
HOLD SPECIMEN: NORMAL
IMM GRANULOCYTES # BLD: 0 10E3/UL
IMM GRANULOCYTES NFR BLD: 0 %
LIPASE SERPL-CCNC: 105 U/L (ref 13–60)
LYMPHOCYTES # BLD AUTO: 2.2 10E3/UL (ref 0.8–5.3)
LYMPHOCYTES NFR BLD AUTO: 28 %
MCH RBC QN AUTO: 32.3 PG (ref 26.5–33)
MCHC RBC AUTO-ENTMCNC: 34.8 G/DL (ref 31.5–36.5)
MCV RBC AUTO: 93 FL (ref 78–100)
MONOCYTES # BLD AUTO: 0.6 10E3/UL (ref 0–1.3)
MONOCYTES NFR BLD AUTO: 7 %
NEUTROPHILS # BLD AUTO: 5 10E3/UL (ref 1.6–8.3)
NEUTROPHILS NFR BLD AUTO: 63 %
NRBC # BLD AUTO: 0 10E3/UL
NRBC BLD AUTO-RTO: 0 /100
PLATELET # BLD AUTO: 253 10E3/UL (ref 150–450)
POTASSIUM SERPL-SCNC: 3.8 MMOL/L (ref 3.4–5.3)
PROT SERPL-MCNC: 6.8 G/DL (ref 6.4–8.3)
RBC # BLD AUTO: 4.7 10E6/UL (ref 4.4–5.9)
SODIUM SERPL-SCNC: 137 MMOL/L (ref 135–145)
WBC # BLD AUTO: 7.9 10E3/UL (ref 4–11)

## 2024-09-25 PROCEDURE — 250N000013 HC RX MED GY IP 250 OP 250 PS 637: Performed by: INTERNAL MEDICINE

## 2024-09-25 PROCEDURE — 99223 1ST HOSP IP/OBS HIGH 75: CPT | Performed by: INTERNAL MEDICINE

## 2024-09-25 PROCEDURE — 96374 THER/PROPH/DIAG INJ IV PUSH: CPT

## 2024-09-25 PROCEDURE — 99285 EMERGENCY DEPT VISIT HI MDM: CPT | Mod: 25

## 2024-09-25 PROCEDURE — 80053 COMPREHEN METABOLIC PANEL: CPT | Performed by: EMERGENCY MEDICINE

## 2024-09-25 PROCEDURE — 250N000011 HC RX IP 250 OP 636: Performed by: EMERGENCY MEDICINE

## 2024-09-25 PROCEDURE — 258N000003 HC RX IP 258 OP 636: Performed by: INTERNAL MEDICINE

## 2024-09-25 PROCEDURE — 120N000001 HC R&B MED SURG/OB

## 2024-09-25 PROCEDURE — 85025 COMPLETE CBC W/AUTO DIFF WBC: CPT | Performed by: EMERGENCY MEDICINE

## 2024-09-25 PROCEDURE — 258N000003 HC RX IP 258 OP 636: Performed by: EMERGENCY MEDICINE

## 2024-09-25 PROCEDURE — 96376 TX/PRO/DX INJ SAME DRUG ADON: CPT

## 2024-09-25 PROCEDURE — 76705 ECHO EXAM OF ABDOMEN: CPT

## 2024-09-25 PROCEDURE — 74177 CT ABD & PELVIS W/CONTRAST: CPT

## 2024-09-25 PROCEDURE — 250N000011 HC RX IP 250 OP 636: Performed by: INTERNAL MEDICINE

## 2024-09-25 PROCEDURE — 83690 ASSAY OF LIPASE: CPT | Performed by: EMERGENCY MEDICINE

## 2024-09-25 PROCEDURE — 36415 COLL VENOUS BLD VENIPUNCTURE: CPT | Performed by: EMERGENCY MEDICINE

## 2024-09-25 PROCEDURE — 96375 TX/PRO/DX INJ NEW DRUG ADDON: CPT

## 2024-09-25 PROCEDURE — 96361 HYDRATE IV INFUSION ADD-ON: CPT

## 2024-09-25 PROCEDURE — 250N000013 HC RX MED GY IP 250 OP 250 PS 637: Performed by: EMERGENCY MEDICINE

## 2024-09-25 RX ORDER — NALOXONE HYDROCHLORIDE 0.4 MG/ML
0.4 INJECTION, SOLUTION INTRAMUSCULAR; INTRAVENOUS; SUBCUTANEOUS
Status: DISCONTINUED | OUTPATIENT
Start: 2024-09-25 | End: 2024-10-01 | Stop reason: HOSPADM

## 2024-09-25 RX ORDER — AMOXICILLIN 250 MG
1 CAPSULE ORAL 2 TIMES DAILY PRN
Status: DISCONTINUED | OUTPATIENT
Start: 2024-09-25 | End: 2024-10-01 | Stop reason: HOSPADM

## 2024-09-25 RX ORDER — OXYCODONE HYDROCHLORIDE 5 MG/1
10 TABLET ORAL ONCE
Status: COMPLETED | OUTPATIENT
Start: 2024-09-25 | End: 2024-09-25

## 2024-09-25 RX ORDER — HYDROMORPHONE HCL IN WATER/PF 6 MG/30 ML
0.4 PATIENT CONTROLLED ANALGESIA SYRINGE INTRAVENOUS
Status: DISCONTINUED | OUTPATIENT
Start: 2024-09-25 | End: 2024-09-26

## 2024-09-25 RX ORDER — CYCLOBENZAPRINE HCL 10 MG
10 TABLET ORAL 3 TIMES DAILY PRN
COMMUNITY
Start: 2024-07-22

## 2024-09-25 RX ORDER — KETOROLAC TROMETHAMINE 30 MG/ML
30 INJECTION, SOLUTION INTRAMUSCULAR; INTRAVENOUS EVERY 6 HOURS PRN
Status: DISCONTINUED | OUTPATIENT
Start: 2024-09-25 | End: 2024-09-26

## 2024-09-25 RX ORDER — NALOXONE HYDROCHLORIDE 0.4 MG/ML
0.2 INJECTION, SOLUTION INTRAMUSCULAR; INTRAVENOUS; SUBCUTANEOUS
Status: DISCONTINUED | OUTPATIENT
Start: 2024-09-25 | End: 2024-10-01 | Stop reason: HOSPADM

## 2024-09-25 RX ORDER — LOSARTAN POTASSIUM 50 MG/1
50 TABLET ORAL DAILY
COMMUNITY
Start: 2023-06-26

## 2024-09-25 RX ORDER — INSULIN LISPRO 100 [IU]/ML
INJECTION, SOLUTION INTRAVENOUS; SUBCUTANEOUS
COMMUNITY
Start: 2024-06-26

## 2024-09-25 RX ORDER — NICOTINE 21 MG/24HR
1 PATCH, TRANSDERMAL 24 HOURS TRANSDERMAL DAILY
Status: DISCONTINUED | OUTPATIENT
Start: 2024-09-26 | End: 2024-09-26

## 2024-09-25 RX ORDER — IOPAMIDOL 755 MG/ML
500 INJECTION, SOLUTION INTRAVASCULAR ONCE
Status: COMPLETED | OUTPATIENT
Start: 2024-09-25 | End: 2024-09-25

## 2024-09-25 RX ORDER — LOSARTAN POTASSIUM 50 MG/1
50 TABLET ORAL DAILY
Status: DISCONTINUED | OUTPATIENT
Start: 2024-09-25 | End: 2024-10-01 | Stop reason: HOSPADM

## 2024-09-25 RX ORDER — ONDANSETRON 2 MG/ML
4 INJECTION INTRAMUSCULAR; INTRAVENOUS EVERY 6 HOURS PRN
Status: DISCONTINUED | OUTPATIENT
Start: 2024-09-25 | End: 2024-10-01 | Stop reason: HOSPADM

## 2024-09-25 RX ORDER — CALCIUM CARBONATE 500 MG/1
1000 TABLET, CHEWABLE ORAL 4 TIMES DAILY PRN
Status: DISCONTINUED | OUTPATIENT
Start: 2024-09-25 | End: 2024-10-01 | Stop reason: HOSPADM

## 2024-09-25 RX ORDER — HYDROMORPHONE HCL IN WATER/PF 6 MG/30 ML
0.2 PATIENT CONTROLLED ANALGESIA SYRINGE INTRAVENOUS
Status: DISCONTINUED | OUTPATIENT
Start: 2024-09-25 | End: 2024-09-26

## 2024-09-25 RX ORDER — HYDROXYZINE HYDROCHLORIDE 25 MG/1
50 TABLET, FILM COATED ORAL
COMMUNITY
Start: 2024-03-06

## 2024-09-25 RX ORDER — CELECOXIB 100 MG/1
100 CAPSULE ORAL 2 TIMES DAILY
COMMUNITY
Start: 2024-08-09

## 2024-09-25 RX ORDER — SODIUM CHLORIDE, SODIUM LACTATE, POTASSIUM CHLORIDE, CALCIUM CHLORIDE 600; 310; 30; 20 MG/100ML; MG/100ML; MG/100ML; MG/100ML
INJECTION, SOLUTION INTRAVENOUS CONTINUOUS
Status: DISCONTINUED | OUTPATIENT
Start: 2024-09-25 | End: 2024-09-25

## 2024-09-25 RX ORDER — LIDOCAINE 40 MG/G
CREAM TOPICAL
Status: DISCONTINUED | OUTPATIENT
Start: 2024-09-25 | End: 2024-10-01 | Stop reason: HOSPADM

## 2024-09-25 RX ORDER — GABAPENTIN 400 MG/1
1200 CAPSULE ORAL 3 TIMES DAILY
Status: DISCONTINUED | OUTPATIENT
Start: 2024-09-25 | End: 2024-10-01 | Stop reason: HOSPADM

## 2024-09-25 RX ORDER — ALBUTEROL SULFATE 90 UG/1
1-2 AEROSOL, METERED RESPIRATORY (INHALATION) EVERY 6 HOURS PRN
Status: DISCONTINUED | OUTPATIENT
Start: 2024-09-25 | End: 2024-10-01 | Stop reason: HOSPADM

## 2024-09-25 RX ORDER — SODIUM CHLORIDE 9 MG/ML
INJECTION, SOLUTION INTRAVENOUS CONTINUOUS
Status: DISCONTINUED | OUTPATIENT
Start: 2024-09-25 | End: 2024-09-26 | Stop reason: ALTCHOICE

## 2024-09-25 RX ORDER — AMOXICILLIN 250 MG
2 CAPSULE ORAL 2 TIMES DAILY PRN
Status: DISCONTINUED | OUTPATIENT
Start: 2024-09-25 | End: 2024-10-01 | Stop reason: HOSPADM

## 2024-09-25 RX ORDER — ROSUVASTATIN CALCIUM 40 MG/1
40 TABLET, COATED ORAL DAILY
COMMUNITY
Start: 2024-08-21

## 2024-09-25 RX ORDER — ONDANSETRON 4 MG/1
4 TABLET, ORALLY DISINTEGRATING ORAL EVERY 6 HOURS PRN
Status: DISCONTINUED | OUTPATIENT
Start: 2024-09-25 | End: 2024-10-01 | Stop reason: HOSPADM

## 2024-09-25 RX ORDER — ROSUVASTATIN CALCIUM 20 MG/1
40 TABLET, COATED ORAL DAILY
Status: DISCONTINUED | OUTPATIENT
Start: 2024-09-25 | End: 2024-10-01 | Stop reason: HOSPADM

## 2024-09-25 RX ORDER — OXYCODONE HYDROCHLORIDE 5 MG/1
5 TABLET ORAL EVERY 4 HOURS PRN
Status: DISCONTINUED | OUTPATIENT
Start: 2024-09-25 | End: 2024-09-26

## 2024-09-25 RX ADMIN — KETOROLAC TROMETHAMINE 30 MG: 30 INJECTION, SOLUTION INTRAMUSCULAR at 23:46

## 2024-09-25 RX ADMIN — GABAPENTIN 1200 MG: 400 CAPSULE ORAL at 22:37

## 2024-09-25 RX ADMIN — SODIUM CHLORIDE 1000 ML: 9 INJECTION, SOLUTION INTRAVENOUS at 17:26

## 2024-09-25 RX ADMIN — ROSUVASTATIN CALCIUM 40 MG: 20 TABLET, FILM COATED ORAL at 23:46

## 2024-09-25 RX ADMIN — LOSARTAN POTASSIUM 50 MG: 50 TABLET, FILM COATED ORAL at 22:36

## 2024-09-25 RX ADMIN — SODIUM CHLORIDE, POTASSIUM CHLORIDE, SODIUM LACTATE AND CALCIUM CHLORIDE: 600; 310; 30; 20 INJECTION, SOLUTION INTRAVENOUS at 20:36

## 2024-09-25 RX ADMIN — HYDROMORPHONE HYDROCHLORIDE 0.4 MG: 0.2 INJECTION, SOLUTION INTRAMUSCULAR; INTRAVENOUS; SUBCUTANEOUS at 22:37

## 2024-09-25 RX ADMIN — OXYCODONE HYDROCHLORIDE 10 MG: 5 TABLET ORAL at 17:21

## 2024-09-25 RX ADMIN — HYDROMORPHONE HYDROCHLORIDE 1 MG: 1 INJECTION, SOLUTION INTRAMUSCULAR; INTRAVENOUS; SUBCUTANEOUS at 20:37

## 2024-09-25 RX ADMIN — SODIUM CHLORIDE: 9 INJECTION, SOLUTION INTRAVENOUS at 21:53

## 2024-09-25 RX ADMIN — HYDROMORPHONE HYDROCHLORIDE 1 MG: 1 INJECTION, SOLUTION INTRAMUSCULAR; INTRAVENOUS; SUBCUTANEOUS at 19:06

## 2024-09-25 RX ADMIN — IOPAMIDOL 100 ML: 755 INJECTION, SOLUTION INTRAVENOUS at 19:22

## 2024-09-25 RX ADMIN — FAMOTIDINE 20 MG: 10 INJECTION, SOLUTION INTRAVENOUS at 17:26

## 2024-09-25 ASSESSMENT — ACTIVITIES OF DAILY LIVING (ADL)
ADLS_ACUITY_SCORE: 35
ADLS_ACUITY_SCORE: 21
ADLS_ACUITY_SCORE: 21
ADLS_ACUITY_SCORE: 33
ADLS_ACUITY_SCORE: 35

## 2024-09-25 ASSESSMENT — COLUMBIA-SUICIDE SEVERITY RATING SCALE - C-SSRS
1. IN THE PAST MONTH, HAVE YOU WISHED YOU WERE DEAD OR WISHED YOU COULD GO TO SLEEP AND NOT WAKE UP?: NO
2. HAVE YOU ACTUALLY HAD ANY THOUGHTS OF KILLING YOURSELF IN THE PAST MONTH?: NO
6. HAVE YOU EVER DONE ANYTHING, STARTED TO DO ANYTHING, OR PREPARED TO DO ANYTHING TO END YOUR LIFE?: NO

## 2024-09-25 NOTE — ED TRIAGE NOTES
Pt arrives with complaint of epigastric pain. History of pancreatitis, feels same. Pain began yesterday. Endorses N/V/D. A&Ox4.

## 2024-09-25 NOTE — ED PROVIDER NOTES
Emergency Department Note      History of Present Illness     Chief Complaint   Abdominal Pain    HPI   Ozzie Olmedo is a 43 year old male with a history of hypertension, hyperlipidemia, and type 2 diabetes who presents to the ER for upper intermittent 8/10 stabbing abdominal pain that radiates to his sides occasionally. Patient reports the pain starting yesterday morning with vomiting but denies vomiting since and has not eaten food or drank since. He states that he has had pancreatitis 4 years ago from weight loss medication but still has his gallbladder. Ozzie endorses vomiting yesterday, drinking alcohol a couple times a week, taking a tylenol today, and having more alcohol than usual last weekend. Patient denies new medication, weight loss, chest pain, trouble breathing, fever, chills, lower abdominal pain, or use of pain medication.     Independent Historian   None    Review of External Notes   No    Past Medical History     Medical History and Problem List   Diabetes type 2  Hyperlipemia  Hypertension  Juvenile osteochondrosis of spine  Curtis's esophagus   Bicepts tendinopathy  DJD of AC  Osteoarthritis  Hernia     Medications   Albuterol   Atorvastatin   Diazepam   Oxycodone   Tizanidine  Cyclobenzaprine   Hydroxyzine   Losartan   Ondansetron   Prednisone   Rosuvastatin     Surgical History   Cardiac ablation  EGD  Lumbar laminectomy   Repair hammer toe  Repair ulnar ligament  Physical Exam     Patient Vitals for the past 24 hrs:   BP Temp Pulse Resp SpO2 Height Weight   09/25/24 2105 (!) 158/94 -- 70 -- -- -- --   09/25/24 2017 (!) 152/106 -- 87 -- 95 % -- --   09/25/24 2012 -- -- -- -- 96 % -- --   09/25/24 1908 (!) 172/103 -- 62 -- 98 % -- --   09/25/24 1536 (!) 166/105 -- -- -- -- -- --   09/25/24 1535 -- 98.6  F (37  C) 85 18 98 % -- --   09/25/24 1534 -- -- -- -- -- 1.829 m (6') 117.1 kg (258 lb 2.5 oz)     Physical Exam  Eyes:               Sclera white; Pupils are equal and round  ENT:                 External ears and nares normal  CV:                  Rate as above with regular rhythm   Resp:               Breath sounds clear and equal bilaterally                          Non-labored, no retractions or accessory muscle use  GI:                   Abdomen is epigastric tenderness, no lower abdominal tenderness                          No rebound tenderness or peritoneal features  MS:                  Moves all extremities  Skin:                Warm and dry  Neuro:             Speech is normal and fluent. No apparent deficit.    Diagnostics     Lab Results   Labs Ordered and Resulted from Time of ED Arrival to Time of ED Departure   COMPREHENSIVE METABOLIC PANEL - Abnormal       Result Value    Sodium 137      Potassium 3.8      Carbon Dioxide (CO2) 23      Anion Gap 16 (*)     Urea Nitrogen 11.0      Creatinine 0.94      GFR Estimate >90      Calcium 8.9      Chloride 98      Glucose 138 (*)     Alkaline Phosphatase 98      AST 26      ALT 40      Protein Total 6.8      Albumin 4.4      Bilirubin Total 0.8     LIPASE - Abnormal    Lipase 105 (*)    CBC WITH PLATELETS AND DIFFERENTIAL    WBC Count 7.9      RBC Count 4.70      Hemoglobin 15.2      Hematocrit 43.7      MCV 93      MCH 32.3      MCHC 34.8      RDW 12.4      Platelet Count 253      % Neutrophils 63      % Lymphocytes 28      % Monocytes 7      % Eosinophils 1      % Basophils 1      % Immature Granulocytes 0      NRBCs per 100 WBC 0      Absolute Neutrophils 5.0      Absolute Lymphocytes 2.2      Absolute Monocytes 0.6      Absolute Eosinophils 0.1      Absolute Basophils 0.0      Absolute Immature Granulocytes 0.0      Absolute NRBCs 0.0         Imaging   CT Abdomen Pelvis w Contrast   Final Result   IMPRESSION:    1.  Acute interstitial edematous focal pancreatitis without fluid collections.      US Abdomen Limited   Final Result   IMPRESSION:   1.  Advanced fatty infiltration of the liver.      2.  Mild gallbladder wall thickening at 3.4 mm,  the gallbladder is otherwise normal in appearance.      3.  Normal right kidney and bile ducts.      4.  The majority of the pancreas is not well seen given overlying bowel gas.      5.  Since 7/8/2020, there has been no significant changes.              Independent Interpretation   None    ED Course      Medications Administered   Medications   lactated ringers infusion ( Intravenous $New Bag 9/25/24 2036)   lidocaine 1 % 0.1-1 mL (has no administration in time range)   lidocaine (LMX4) cream (has no administration in time range)   sodium chloride (PF) 0.9% PF flush 3 mL (has no administration in time range)   sodium chloride (PF) 0.9% PF flush 3 mL (has no administration in time range)   senna-docusate (SENOKOT-S/PERICOLACE) 8.6-50 MG per tablet 1 tablet (has no administration in time range)     Or   senna-docusate (SENOKOT-S/PERICOLACE) 8.6-50 MG per tablet 2 tablet (has no administration in time range)   calcium carbonate (TUMS) chewable tablet 1,000 mg (has no administration in time range)   sodium chloride 0.9 % infusion (has no administration in time range)   HYDROmorphone (DILAUDID) injection 0.2 mg (has no administration in time range)   HYDROmorphone (DILAUDID) injection 0.4 mg (has no administration in time range)   melatonin tablet 5 mg (has no administration in time range)   ondansetron (ZOFRAN ODT) ODT tab 4 mg (has no administration in time range)     Or   ondansetron (ZOFRAN) injection 4 mg (has no administration in time range)   naloxone (NARCAN) injection 0.2 mg (has no administration in time range)     Or   naloxone (NARCAN) injection 0.4 mg (has no administration in time range)     Or   naloxone (NARCAN) injection 0.2 mg (has no administration in time range)     Or   naloxone (NARCAN) injection 0.4 mg (has no administration in time range)   sodium chloride 0.9% BOLUS 1,000 mL (0 mLs Intravenous Stopped 9/25/24 0365)   oxyCODONE (ROXICODONE) tablet 10 mg (10 mg Oral $Given 9/25/24 1721)    famotidine (PEPCID) injection 20 mg (20 mg Intravenous $Given 9/25/24 1726)   HYDROmorphone (DILAUDID) injection 1 mg (1 mg Intravenous $Given 9/25/24 1906)   sodium chloride (PF) 0.9% PF flush 100 mL (65 mLs Intravenous $Given 9/25/24 1922)   iopamidol (ISOVUE-370) solution 500 mL (100 mLs Intravenous $Given 9/25/24 1922)   HYDROmorphone (DILAUDID) injection 1 mg (1 mg Intravenous $Given 9/25/24 2037)       Discussion of Management   Admitting Hospitalist, Dr. Michelle    ED Course   ED Course as of 09/25/24 2123   Wed Sep 25, 2024   1703 I obtained the history and examined the patient as noted above.    1852 I rechecked patient and explained findings and plan of care.     2044 I spoke with Dr. Ray, hospitalist PA, who accepts the patient for Dr. Michelle.        Additional Documentation  None    Medical Decision Making / Diagnosis     CMS Diagnoses: None    MIPS       None    MDM   Differential is broad and includes gastritis, peptic ulcer disease, biliary colic, cholecystitis, hepatitis, pancreatitis.  Lipase is elevated but not the typical 4-5 times normal frequently associated with pancreatitis.  US without biliary pathology as source of pain.  On recheck oral medications in the intake area did not result in much improvement and he is lying calmly on the ground rather than sitting because it feels better.  This is not compatible with an outpatient management plan.  CT obtained due to severe ongoing pain.  Pancreatitis, suspect alcohol induced given the history.  Admission arranged.    Disposition   The patient was admitted to the hospital.     Diagnosis     ICD-10-CM    1. Alcohol-induced acute pancreatitis without infection or necrosis  K85.20          Scribe Disclosure:  I, Tino Turcios, am serving as a scribe at 7:18 PM on 9/25/2024 to document services personally performed by Susanne Snow MD based on my observations and the provider's statements to me.        Susanne Snow MD  09/26/24  4116

## 2024-09-26 LAB
ALBUMIN SERPL BCG-MCNC: 4.1 G/DL (ref 3.5–5.2)
ALP SERPL-CCNC: 87 U/L (ref 40–150)
ALT SERPL W P-5'-P-CCNC: 32 U/L (ref 0–70)
ANION GAP SERPL CALCULATED.3IONS-SCNC: 14 MMOL/L (ref 7–15)
AST SERPL W P-5'-P-CCNC: 23 U/L (ref 0–45)
BASOPHILS # BLD AUTO: 0 10E3/UL (ref 0–0.2)
BASOPHILS NFR BLD AUTO: 0 %
BILIRUB SERPL-MCNC: 0.6 MG/DL
BUN SERPL-MCNC: 10.9 MG/DL (ref 6–20)
CALCIUM SERPL-MCNC: 8.3 MG/DL (ref 8.8–10.4)
CHLORIDE SERPL-SCNC: 101 MMOL/L (ref 98–107)
CREAT SERPL-MCNC: 0.93 MG/DL (ref 0.67–1.17)
EGFRCR SERPLBLD CKD-EPI 2021: >90 ML/MIN/1.73M2
EOSINOPHIL # BLD AUTO: 0.1 10E3/UL (ref 0–0.7)
EOSINOPHIL NFR BLD AUTO: 1 %
ERYTHROCYTE [DISTWIDTH] IN BLOOD BY AUTOMATED COUNT: 12.2 % (ref 10–15)
GLUCOSE BLDC GLUCOMTR-MCNC: 109 MG/DL (ref 70–99)
GLUCOSE BLDC GLUCOMTR-MCNC: 112 MG/DL (ref 70–99)
GLUCOSE BLDC GLUCOMTR-MCNC: 121 MG/DL (ref 70–99)
GLUCOSE BLDC GLUCOMTR-MCNC: 125 MG/DL (ref 70–99)
GLUCOSE SERPL-MCNC: 132 MG/DL (ref 70–99)
HCO3 SERPL-SCNC: 23 MMOL/L (ref 22–29)
HCT VFR BLD AUTO: 41.9 % (ref 40–53)
HGB BLD-MCNC: 14.3 G/DL (ref 13.3–17.7)
IMM GRANULOCYTES # BLD: 0 10E3/UL
IMM GRANULOCYTES NFR BLD: 0 %
LIPASE SERPL-CCNC: 109 U/L (ref 13–60)
LYMPHOCYTES # BLD AUTO: 2.7 10E3/UL (ref 0.8–5.3)
LYMPHOCYTES NFR BLD AUTO: 27 %
MCH RBC QN AUTO: 32.1 PG (ref 26.5–33)
MCHC RBC AUTO-ENTMCNC: 34.1 G/DL (ref 31.5–36.5)
MCV RBC AUTO: 94 FL (ref 78–100)
MONOCYTES # BLD AUTO: 0.6 10E3/UL (ref 0–1.3)
MONOCYTES NFR BLD AUTO: 6 %
NEUTROPHILS # BLD AUTO: 6.4 10E3/UL (ref 1.6–8.3)
NEUTROPHILS NFR BLD AUTO: 65 %
NRBC # BLD AUTO: 0 10E3/UL
NRBC BLD AUTO-RTO: 0 /100
PLATELET # BLD AUTO: 220 10E3/UL (ref 150–450)
POTASSIUM SERPL-SCNC: 4 MMOL/L (ref 3.4–5.3)
PROT SERPL-MCNC: 6.2 G/DL (ref 6.4–8.3)
RBC # BLD AUTO: 4.45 10E6/UL (ref 4.4–5.9)
SODIUM SERPL-SCNC: 138 MMOL/L (ref 135–145)
WBC # BLD AUTO: 9.8 10E3/UL (ref 4–11)

## 2024-09-26 PROCEDURE — 258N000003 HC RX IP 258 OP 636: Performed by: HOSPITALIST

## 2024-09-26 PROCEDURE — 250N000011 HC RX IP 250 OP 636: Performed by: HOSPITALIST

## 2024-09-26 PROCEDURE — 85025 COMPLETE CBC W/AUTO DIFF WBC: CPT | Performed by: INTERNAL MEDICINE

## 2024-09-26 PROCEDURE — 99233 SBSQ HOSP IP/OBS HIGH 50: CPT | Performed by: HOSPITALIST

## 2024-09-26 PROCEDURE — 120N000001 HC R&B MED SURG/OB

## 2024-09-26 PROCEDURE — 80053 COMPREHEN METABOLIC PANEL: CPT | Performed by: INTERNAL MEDICINE

## 2024-09-26 PROCEDURE — 250N000013 HC RX MED GY IP 250 OP 250 PS 637: Performed by: HOSPITALIST

## 2024-09-26 PROCEDURE — 258N000003 HC RX IP 258 OP 636: Performed by: INTERNAL MEDICINE

## 2024-09-26 PROCEDURE — 250N000011 HC RX IP 250 OP 636: Performed by: INTERNAL MEDICINE

## 2024-09-26 PROCEDURE — 36415 COLL VENOUS BLD VENIPUNCTURE: CPT | Performed by: INTERNAL MEDICINE

## 2024-09-26 PROCEDURE — 250N000009 HC RX 250: Performed by: INTERNAL MEDICINE

## 2024-09-26 PROCEDURE — 250N000013 HC RX MED GY IP 250 OP 250 PS 637: Performed by: INTERNAL MEDICINE

## 2024-09-26 PROCEDURE — 83690 ASSAY OF LIPASE: CPT | Performed by: INTERNAL MEDICINE

## 2024-09-26 RX ORDER — HYDROXYZINE HYDROCHLORIDE 50 MG/1
50 TABLET, FILM COATED ORAL EVERY 6 HOURS PRN
Status: DISCONTINUED | OUTPATIENT
Start: 2024-09-26 | End: 2024-10-01 | Stop reason: HOSPADM

## 2024-09-26 RX ORDER — MORPHINE SULFATE 2 MG/ML
2 INJECTION, SOLUTION INTRAMUSCULAR; INTRAVENOUS
Status: DISCONTINUED | OUTPATIENT
Start: 2024-09-26 | End: 2024-09-26

## 2024-09-26 RX ORDER — AMOXICILLIN 250 MG
2 CAPSULE ORAL 2 TIMES DAILY
Status: DISCONTINUED | OUTPATIENT
Start: 2024-09-26 | End: 2024-10-01 | Stop reason: HOSPADM

## 2024-09-26 RX ORDER — HYDROMORPHONE HYDROCHLORIDE 1 MG/ML
0.5 INJECTION, SOLUTION INTRAMUSCULAR; INTRAVENOUS; SUBCUTANEOUS
Status: DISCONTINUED | OUTPATIENT
Start: 2024-09-26 | End: 2024-09-26

## 2024-09-26 RX ORDER — OXYCODONE HYDROCHLORIDE 5 MG/1
5-10 TABLET ORAL EVERY 4 HOURS PRN
Status: DISCONTINUED | OUTPATIENT
Start: 2024-09-26 | End: 2024-10-01 | Stop reason: HOSPADM

## 2024-09-26 RX ORDER — SODIUM CHLORIDE, SODIUM LACTATE, POTASSIUM CHLORIDE, CALCIUM CHLORIDE 600; 310; 30; 20 MG/100ML; MG/100ML; MG/100ML; MG/100ML
INJECTION, SOLUTION INTRAVENOUS CONTINUOUS
Status: DISCONTINUED | OUTPATIENT
Start: 2024-09-26 | End: 2024-09-29

## 2024-09-26 RX ORDER — POLYETHYLENE GLYCOL 3350 17 G/17G
17 POWDER, FOR SOLUTION ORAL DAILY
Status: DISCONTINUED | OUTPATIENT
Start: 2024-09-26 | End: 2024-10-01 | Stop reason: HOSPADM

## 2024-09-26 RX ORDER — HYDROXYZINE HYDROCHLORIDE 25 MG/1
25 TABLET, FILM COATED ORAL EVERY 6 HOURS PRN
Status: DISCONTINUED | OUTPATIENT
Start: 2024-09-26 | End: 2024-10-01 | Stop reason: HOSPADM

## 2024-09-26 RX ORDER — AMOXICILLIN 250 MG
1 CAPSULE ORAL 2 TIMES DAILY
Status: DISCONTINUED | OUTPATIENT
Start: 2024-09-26 | End: 2024-10-01 | Stop reason: HOSPADM

## 2024-09-26 RX ORDER — HYDROMORPHONE HYDROCHLORIDE 1 MG/ML
0.3 INJECTION, SOLUTION INTRAMUSCULAR; INTRAVENOUS; SUBCUTANEOUS
Status: DISCONTINUED | OUTPATIENT
Start: 2024-09-26 | End: 2024-09-26

## 2024-09-26 RX ORDER — HYDROMORPHONE HYDROCHLORIDE 1 MG/ML
1 INJECTION, SOLUTION INTRAMUSCULAR; INTRAVENOUS; SUBCUTANEOUS
Status: DISCONTINUED | OUTPATIENT
Start: 2024-09-26 | End: 2024-09-26

## 2024-09-26 RX ORDER — MORPHINE SULFATE 2 MG/ML
4 INJECTION, SOLUTION INTRAMUSCULAR; INTRAVENOUS
Status: DISCONTINUED | OUTPATIENT
Start: 2024-09-26 | End: 2024-09-26

## 2024-09-26 RX ADMIN — HYDROMORPHONE HYDROCHLORIDE 0.4 MG: 0.2 INJECTION, SOLUTION INTRAMUSCULAR; INTRAVENOUS; SUBCUTANEOUS at 05:09

## 2024-09-26 RX ADMIN — MORPHINE SULFATE 2 MG: 2 INJECTION, SOLUTION INTRAMUSCULAR; INTRAVENOUS at 07:50

## 2024-09-26 RX ADMIN — NICOTINE POLACRILEX 2 MG: 2 GUM, CHEWING BUCCAL at 20:01

## 2024-09-26 RX ADMIN — NICOTINE POLACRILEX 2 MG: 2 GUM, CHEWING BUCCAL at 13:18

## 2024-09-26 RX ADMIN — GABAPENTIN 1200 MG: 400 CAPSULE ORAL at 13:17

## 2024-09-26 RX ADMIN — Medication: at 16:47

## 2024-09-26 RX ADMIN — SODIUM CHLORIDE, POTASSIUM CHLORIDE, SODIUM LACTATE AND CALCIUM CHLORIDE: 600; 310; 30; 20 INJECTION, SOLUTION INTRAVENOUS at 23:20

## 2024-09-26 RX ADMIN — SODIUM CHLORIDE, POTASSIUM CHLORIDE, SODIUM LACTATE AND CALCIUM CHLORIDE: 600; 310; 30; 20 INJECTION, SOLUTION INTRAVENOUS at 14:44

## 2024-09-26 RX ADMIN — POLYETHYLENE GLYCOL 3350 17 G: 17 POWDER, FOR SOLUTION ORAL at 16:59

## 2024-09-26 RX ADMIN — HYDROMORPHONE HYDROCHLORIDE 0.5 MG: 1 INJECTION, SOLUTION INTRAMUSCULAR; INTRAVENOUS; SUBCUTANEOUS at 10:42

## 2024-09-26 RX ADMIN — HYDROXYZINE HYDROCHLORIDE 50 MG: 50 TABLET, FILM COATED ORAL at 16:58

## 2024-09-26 RX ADMIN — NICOTINE POLACRILEX 2 MG: 2 GUM, CHEWING BUCCAL at 10:48

## 2024-09-26 RX ADMIN — GABAPENTIN 1200 MG: 400 CAPSULE ORAL at 19:58

## 2024-09-26 RX ADMIN — HYDROMORPHONE HYDROCHLORIDE 0.4 MG: 0.2 INJECTION, SOLUTION INTRAMUSCULAR; INTRAVENOUS; SUBCUTANEOUS at 03:04

## 2024-09-26 RX ADMIN — SENNOSIDES AND DOCUSATE SODIUM 1 TABLET: 8.6; 5 TABLET ORAL at 20:00

## 2024-09-26 RX ADMIN — PANTOPRAZOLE SODIUM 40 MG: 40 INJECTION, POWDER, FOR SOLUTION INTRAVENOUS at 07:56

## 2024-09-26 RX ADMIN — OXYCODONE HYDROCHLORIDE 5 MG: 5 TABLET ORAL at 01:14

## 2024-09-26 RX ADMIN — OXYCODONE HYDROCHLORIDE 10 MG: 5 TABLET ORAL at 13:17

## 2024-09-26 RX ADMIN — HYDROMORPHONE HYDROCHLORIDE 0.5 MG: 1 INJECTION, SOLUTION INTRAMUSCULAR; INTRAVENOUS; SUBCUTANEOUS at 14:44

## 2024-09-26 RX ADMIN — SENNOSIDES AND DOCUSATE SODIUM 1 TABLET: 8.6; 5 TABLET ORAL at 19:59

## 2024-09-26 RX ADMIN — NICOTINE POLACRILEX 2 MG: 2 GUM, CHEWING BUCCAL at 09:06

## 2024-09-26 RX ADMIN — HYDROMORPHONE HYDROCHLORIDE 0.5 MG: 1 INJECTION, SOLUTION INTRAMUSCULAR; INTRAVENOUS; SUBCUTANEOUS at 12:46

## 2024-09-26 RX ADMIN — LOSARTAN POTASSIUM 50 MG: 50 TABLET, FILM COATED ORAL at 07:55

## 2024-09-26 RX ADMIN — NICOTINE POLACRILEX 2 MG: 2 GUM, CHEWING BUCCAL at 18:04

## 2024-09-26 RX ADMIN — KETOROLAC TROMETHAMINE 30 MG: 30 INJECTION, SOLUTION INTRAMUSCULAR at 05:47

## 2024-09-26 RX ADMIN — OXYCODONE HYDROCHLORIDE 5 MG: 5 TABLET ORAL at 09:06

## 2024-09-26 RX ADMIN — HYDROMORPHONE HYDROCHLORIDE 0.4 MG: 0.2 INJECTION, SOLUTION INTRAMUSCULAR; INTRAVENOUS; SUBCUTANEOUS at 00:37

## 2024-09-26 RX ADMIN — Medication 5 MG: at 01:14

## 2024-09-26 RX ADMIN — SODIUM CHLORIDE: 9 INJECTION, SOLUTION INTRAVENOUS at 07:56

## 2024-09-26 RX ADMIN — ROSUVASTATIN CALCIUM 40 MG: 20 TABLET, FILM COATED ORAL at 19:57

## 2024-09-26 RX ADMIN — GABAPENTIN 1200 MG: 400 CAPSULE ORAL at 07:55

## 2024-09-26 ASSESSMENT — ACTIVITIES OF DAILY LIVING (ADL)
ADLS_ACUITY_SCORE: 21
ADLS_ACUITY_SCORE: 20
ADLS_ACUITY_SCORE: 21
ADLS_ACUITY_SCORE: 20
ADLS_ACUITY_SCORE: 21
ADLS_ACUITY_SCORE: 20
ADLS_ACUITY_SCORE: 21
ADLS_ACUITY_SCORE: 20
ADLS_ACUITY_SCORE: 20
ADLS_ACUITY_SCORE: 21
ADLS_ACUITY_SCORE: 20
ADLS_ACUITY_SCORE: 20
ADLS_ACUITY_SCORE: 21
ADLS_ACUITY_SCORE: 21

## 2024-09-26 NOTE — ED NOTES
[facility]  ED Nurse Handoff Report    ED Chief complaint: Abdominal Pain  . ED Diagnosis:   Final diagnoses:   Alcohol-induced acute pancreatitis without infection or necrosis       Allergies:   Allergies   Allergen Reactions    No Known Drug Allergy     Semaglutide(0.25 Or 0.5mg-Dos) Other (See Comments)     Caused Pancreatitis        Code Status: Full Code    Activity level - Baseline/Home:  independent.  Activity Level - Current:   standby.   Lift room needed: No.   Bariatric: No   Needed: No   Isolation: No.   Infection: Not Applicable.     Respiratory status: Room air    Vital Signs (within 30 minutes):   Vitals:    09/25/24 1536 09/25/24 1908 09/25/24 2012 09/25/24 2017   BP: (!) 166/105 (!) 172/103  (!) 152/106   Pulse:  62  87   Resp:       Temp:       SpO2:  98% 96% 95%   Weight:       Height:           Cardiac Rhythm:  ,      Pain level:    Patient confused: No.   Patient Falls Risk: assistive device/personal items within reach.   Elimination Status: Has voided     Patient Report - Initial Complaint: Pt arrives with complaint of epigastric pain. History of pancreatitis, feels same. Pain began yesterday. Endorses N/V/D. A&Ox4. .   Focused Assessment: . VSS, A&O X4, calm and cooperative at bedside. Pain 5/10 and improving.     Abnormal Results:   Labs Ordered and Resulted from Time of ED Arrival to Time of ED Departure   COMPREHENSIVE METABOLIC PANEL - Abnormal       Result Value    Sodium 137      Potassium 3.8      Carbon Dioxide (CO2) 23      Anion Gap 16 (*)     Urea Nitrogen 11.0      Creatinine 0.94      GFR Estimate >90      Calcium 8.9      Chloride 98      Glucose 138 (*)     Alkaline Phosphatase 98      AST 26      ALT 40      Protein Total 6.8      Albumin 4.4      Bilirubin Total 0.8     LIPASE - Abnormal    Lipase 105 (*)    CBC WITH PLATELETS AND DIFFERENTIAL    WBC Count 7.9      RBC Count 4.70      Hemoglobin 15.2      Hematocrit 43.7      MCV 93      MCH  32.3      MCHC 34.8      RDW 12.4      Platelet Count 253      % Neutrophils 63      % Lymphocytes 28      % Monocytes 7      % Eosinophils 1      % Basophils 1      % Immature Granulocytes 0      NRBCs per 100 WBC 0      Absolute Neutrophils 5.0      Absolute Lymphocytes 2.2      Absolute Monocytes 0.6      Absolute Eosinophils 0.1      Absolute Basophils 0.0      Absolute Immature Granulocytes 0.0      Absolute NRBCs 0.0          CT Abdomen Pelvis w Contrast   Final Result   IMPRESSION:    1.  Acute interstitial edematous focal pancreatitis without fluid collections.      US Abdomen Limited   Final Result   IMPRESSION:   1.  Advanced fatty infiltration of the liver.      2.  Mild gallbladder wall thickening at 3.4 mm, the gallbladder is otherwise normal in appearance.      3.  Normal right kidney and bile ducts.      4.  The majority of the pancreas is not well seen given overlying bowel gas.      5.  Since 7/8/2020, there has been no significant changes.                Treatments provided: See mAR/notes  Family Comments: N/A  OBS brochure/video discussed/provided to patient:  No  ED Medications:   Medications   lactated ringers infusion ( Intravenous $New Bag 9/25/24 2036)   sodium chloride 0.9% BOLUS 1,000 mL (0 mLs Intravenous Stopped 9/25/24 1855)   oxyCODONE (ROXICODONE) tablet 10 mg (10 mg Oral $Given 9/25/24 1721)   famotidine (PEPCID) injection 20 mg (20 mg Intravenous $Given 9/25/24 1726)   HYDROmorphone (DILAUDID) injection 1 mg (1 mg Intravenous $Given 9/25/24 1906)   sodium chloride (PF) 0.9% PF flush 100 mL (65 mLs Intravenous $Given 9/25/24 1922)   iopamidol (ISOVUE-370) solution 500 mL (100 mLs Intravenous $Given 9/25/24 1922)   HYDROmorphone (DILAUDID) injection 1 mg (1 mg Intravenous $Given 9/25/24 2037)       Drips infusing:  No  For the majority of the shift this patient was Green.   Interventions performed were N/a.    Sepsis treatment initiated: No    Cares/treatment/interventions/medications  to be completed following ED care: N/A    ED Nurse Name: Tom Cabrera RN  8:49 PM    RECEIVING UNIT ED HANDOFF REVIEW    Above ED Nurse Handoff Report was reviewed: Yes  Reviewed by: Aravind Barbosa RN on September 25, 2024 at 9:09 PM   I Allie called the ED to inform them the note was read: Yes

## 2024-09-26 NOTE — PHARMACY-ADMISSION MEDICATION HISTORY
Pharmacist Admission Medication History    Admission medication history is complete. The information provided in this note is only as accurate as the sources available at the time of the update.    Information Source(s): Patient and CareEverywhere/SureScripts via in-person    Pertinent Information: none    Changes made to PTA medication list:  Added: Celebrex, cyclobenzaprine, hydroxyzine, Humalog, losartan, omeprazole, rosuvastatin  Deleted: atorvastatin, diazepam, Flovent, Lantus  Changed: None    Medication History Completed By:   Ximena Roland, PharmD, [facility]   Emergency Medicine Pharmacist  [phone] or Allie  September 25, 2024      PTA Med List   Medication Sig Last Dose    acetaminophen (TYLENOL) 500 MG tablet Take 1,000 mg by mouth 2 times daily 9/24/2024 at pm    celecoxib (CELEBREX) 100 MG capsule Take 100 mg by mouth 2 times daily. 9/24/2024 at pm    cyclobenzaprine (FLEXERIL) 10 MG tablet Take 10 mg by mouth 3 times daily as needed for muscle spasms. prn at prn    gabapentin (NEURONTIN) 300 MG capsule Take 1,200 mg by mouth 3 times daily 9/24/2024 at pm    HUMALOG KWIKPEN 100 UNIT/ML soln Inject subcutaneously 3 times daily (before meals). SLIDING SCALE   <150: 0 UNITS,  150-200: 2 UNITS,   201-250: 4 UNITS,  >250: 6 UNITS.   MAX DOSE OF 20UNITS DAILY 9/24/2024 at -    hydrOXYzine HCl (ATARAX) 25 MG tablet Take 50 mg by mouth nightly as needed (sleep). Past Week at -    losartan (COZAAR) 50 MG tablet Take 50 mg by mouth daily. 9/24/2024 at -    metFORMIN (GLUCOPHAGE-XR) 500 MG 24 hr tablet Take 2,000 mg by mouth at bedtime. 9/24/2024 at hs    omeprazole (PRILOSEC) 20 MG DR capsule Take 20 mg by mouth daily. 9/24/2024 at -    oxyCODONE (ROXICODONE) 5 MG tablet Take 1-2 tablets (5-10 mg) by mouth every 4 hours as needed for moderate to severe pain More than a month at -    rosuvastatin (CRESTOR) 40 MG tablet Take 40 mg by mouth daily. 9/24/2024 at -    tiZANidine (ZANAFLEX) 2 MG tablet Take 4-6 mg  by mouth at bedtime. And left shoulder 9/24/2024 at hs

## 2024-09-26 NOTE — H&P
Cross Cover    Called for inadequate pain control  Here with recurrent alcohol induced pancreatitis    Ordered oxy prn and prn ketorolac       Cross Cover #2 12:50 am     Called for pt request for tizanidine (home med) and melatonin   -no on the tizanidine given the degree of narcs that he's requiring, can try heating pad  -melatonin ordered     Cross Cover #3 5:15 am    Called for ongoing issues with pain control. Did have some relief with ketorolac but still with pain levels in the 7 range.  Per conversation with RN patient stated that morphine worked better for him than hydromorphone  -stopped hydromorphone and will change to morphine

## 2024-09-26 NOTE — PLAN OF CARE
Pt A&O x4. Consistently rates pain 8/10-PO oxycodone and IV dilaudid given. Up independently. Voiding. Remains NPO.     Problem: Adult Inpatient Plan of Care  Goal: Plan of Care Review  Description: The Plan of Care Review/Shift note should be completed every shift.  The Outcome Evaluation is a brief statement about your assessment that the patient is improving, declining, or no change.  This information will be displayed automatically on your shift  note.  Outcome: Not Progressing  Flowsheets (Taken 9/26/2024 1420)  Plan of Care Reviewed With: patient  Overall Patient Progress: no change     Problem: Pain Acute  Goal: Optimal Pain Control and Function  Outcome: Not Progressing  Intervention: Develop Pain Management Plan  Recent Flowsheet Documentation  Taken 9/26/2024 1317 by Rocío Paz RN  Pain Management Interventions: medication (see MAR)  Taken 9/26/2024 1246 by Rocío Paz RN  Pain Management Interventions: medication (see MAR)  Taken 9/26/2024 1042 by Rocío Paz RN  Pain Management Interventions: medication (see MAR)  Taken 9/26/2024 0906 by Rocío Paz RN  Pain Management Interventions: medication (see MAR)  Taken 9/26/2024 0750 by Rocío Paz RN  Pain Management Interventions: medication (see MAR)  Intervention: Prevent or Manage Pain  Recent Flowsheet Documentation  Taken 9/26/2024 0802 by Rocío Paz RN  Medication Review/Management: medications reviewed     Problem: Pancreatitis  Goal: Optimal Nutrition Delivery  Outcome: Not Progressing  Goal: Optimal Pain Control and Function  Outcome: Not Progressing  Intervention: Monitor and Manage Pain  Recent Flowsheet Documentation  Taken 9/26/2024 1317 by Rocío Paz RN  Pain Management Interventions: medication (see MAR)  Taken 9/26/2024 1246 by Rocío Paz RN  Pain Management Interventions: medication (see MAR)  Taken 9/26/2024 1042 by Rocío Paz RN  Pain Management Interventions: medication (see  "MAR)  Taken 9/26/2024 0906 by Rocío Paz RN  Pain Management Interventions: medication (see MAR)  Taken 9/26/2024 0750 by Rocío Paz RN  Pain Management Interventions: medication (see MAR)     Problem: Adult Inpatient Plan of Care  Goal: Patient-Specific Goal (Individualized)  Description: You can add care plan individualizations to a care plan. Examples of Individualization might be:  \"Parent requests to be called daily at 9am for status\", \"I have a hard time hearing out of my right ear\", or \"Do not touch me to wake me up as it startles  me\".  Outcome: Progressing  Goal: Absence of Hospital-Acquired Illness or Injury  Outcome: Progressing  Intervention: Identify and Manage Fall Risk  Recent Flowsheet Documentation  Taken 9/26/2024 0802 by Rocío Paz RN  Safety Promotion/Fall Prevention:   nonskid shoes/slippers when out of bed   safety round/check completed  Intervention: Prevent Skin Injury  Recent Flowsheet Documentation  Taken 9/26/2024 0750 by Rocío Paz RN  Body Position: position changed independently  Intervention: Prevent and Manage VTE (Venous Thromboembolism) Risk  Recent Flowsheet Documentation  Taken 9/26/2024 0802 by Rocío Paz RN  VTE Prevention/Management: SCDs off (sequential compression devices)  Intervention: Prevent Infection  Recent Flowsheet Documentation  Taken 9/26/2024 0802 by Rocío Paz RN  Infection Prevention: rest/sleep promoted  Goal: Optimal Comfort and Wellbeing  Outcome: Progressing  Intervention: Monitor Pain and Promote Comfort  Recent Flowsheet Documentation  Taken 9/26/2024 1317 by Rocío Paz RN  Pain Management Interventions: medication (see MAR)  Taken 9/26/2024 1246 by Rocío Paz RN  Pain Management Interventions: medication (see MAR)  Taken 9/26/2024 1042 by Rocío Paz RN  Pain Management Interventions: medication (see MAR)  Taken 9/26/2024 0906 by Rocío Paz RN  Pain Management Interventions: medication " "(see MAR)  Taken 9/26/2024 0750 by Rocío Paz RN  Pain Management Interventions: medication (see MAR)  Goal: Readiness for Transition of Care  Outcome: Progressing     Problem: Pancreatitis  Goal: Fluid and Electrolyte Balance  Outcome: Progressing  Goal: Absence of Infection Signs and Symptoms  Outcome: Progressing  Intervention: Prevent or Manage Infection  Recent Flowsheet Documentation  Taken 9/26/2024 0802 by Rocío Paz RN  Infection Management: aseptic technique maintained  Goal: Effective Oxygenation and Ventilation  Outcome: Progressing  Intervention: Optimize Oxygenation and Ventilation  Recent Flowsheet Documentation  Taken 9/26/2024 0802 by Rocío Paz RN  Cough And Deep Breathing: done independently per patient  Taken 9/26/2024 0750 by Rocío Paz RN  Activity Management: up ad ash     Problem: Adult Inpatient Plan of Care  Goal: Plan of Care Review  Description: The Plan of Care Review/Shift note should be completed every shift.  The Outcome Evaluation is a brief statement about your assessment that the patient is improving, declining, or no change.  This information will be displayed automatically on your shift  note.  Recent Flowsheet Documentation  Taken 9/26/2024 1420 by Rocío Paz RN  Plan of Care Reviewed With: patient  Overall Patient Progress: no change  9/26/2024 1419 by Rocío Paz RN  Outcome: Adequate for Care Transition  Goal: Patient-Specific Goal (Individualized)  Description: You can add care plan individualizations to a care plan. Examples of Individualization might be:  \"Parent requests to be called daily at 9am for status\", \"I have a hard time hearing out of my right ear\", or \"Do not touch me to wake me up as it startles  me\".  Outcome: Adequate for Care Transition  Goal: Absence of Hospital-Acquired Illness or Injury  Outcome: Adequate for Care Transition  Intervention: Identify and Manage Fall Risk  Recent Flowsheet Documentation  Taken " 9/26/2024 0802 by Rocío Paz RN  Safety Promotion/Fall Prevention:   nonskid shoes/slippers when out of bed   safety round/check completed  Intervention: Prevent Skin Injury  Recent Flowsheet Documentation  Taken 9/26/2024 0750 by Rocío Paz RN  Body Position: position changed independently  Intervention: Prevent and Manage VTE (Venous Thromboembolism) Risk  Recent Flowsheet Documentation  Taken 9/26/2024 0802 by Rocío Paz RN  VTE Prevention/Management: SCDs off (sequential compression devices)  Intervention: Prevent Infection  Recent Flowsheet Documentation  Taken 9/26/2024 0802 by Rocío Paz RN  Infection Prevention: rest/sleep promoted  Goal: Optimal Comfort and Wellbeing  Outcome: Adequate for Care Transition  Intervention: Monitor Pain and Promote Comfort  Recent Flowsheet Documentation  Taken 9/26/2024 1317 by Rocío Paz RN  Pain Management Interventions: medication (see MAR)  Taken 9/26/2024 1246 by Rocío Paz RN  Pain Management Interventions: medication (see MAR)  Taken 9/26/2024 1042 by Rocío Paz RN  Pain Management Interventions: medication (see MAR)  Taken 9/26/2024 0906 by Rocío Paz RN  Pain Management Interventions: medication (see MAR)  Taken 9/26/2024 0750 by Rocío Paz RN  Pain Management Interventions: medication (see MAR)  Goal: Readiness for Transition of Care  Outcome: Adequate for Care Transition     Problem: Pain Acute  Goal: Optimal Pain Control and Function  Outcome: Adequate for Care Transition  Intervention: Develop Pain Management Plan  Recent Flowsheet Documentation  Taken 9/26/2024 1317 by Rocío Paz RN  Pain Management Interventions: medication (see MAR)  Taken 9/26/2024 1246 by Rocío Paz RN  Pain Management Interventions: medication (see MAR)  Taken 9/26/2024 1042 by Rocío Paz RN  Pain Management Interventions: medication (see MAR)  Taken 9/26/2024 0906 by Rocío Paz RN  Pain Management  Interventions: medication (see MAR)  Taken 9/26/2024 0750 by Rocío Paz RN  Pain Management Interventions: medication (see MAR)  Intervention: Prevent or Manage Pain  Recent Flowsheet Documentation  Taken 9/26/2024 0802 by Rocío Paz RN  Medication Review/Management: medications reviewed     Problem: Infection  Goal: Absence of Infection Signs and Symptoms  Outcome: Adequate for Care Transition  Intervention: Prevent or Manage Infection  Recent Flowsheet Documentation  Taken 9/26/2024 0802 by Rocío Paz RN  Infection Management: aseptic technique maintained   Goal Outcome Evaluation:      Plan of Care Reviewed With: patient    Overall Patient Progress: no changeOverall Patient Progress: no change
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
## 2024-09-26 NOTE — PLAN OF CARE
"Goal Outcome Evaluation:      Plan of Care Reviewed With: patient    Overall Patient Progress: no changeOverall Patient Progress: no change         -0700  Pt calm and cooperative. Independent in room. RA. High pain scores this shift. PRN Dilaudid at 2237, 0037, 0304 0509 PRN toradol at 2346, 0547 prn oxy 5 at 0114. Prn melatonin at 0114  Pt has remained NPO except meds this shift NS at 125 ml/hr bg monitored q4 hrs         Problem: Adult Inpatient Plan of Care  Goal: Plan of Care Review  Description: The Plan of Care Review/Shift note should be completed every shift.  The Outcome Evaluation is a brief statement about your assessment that the patient is improving, declining, or no change.  This information will be displayed automatically on your shift  note.  Outcome: Progressing  Flowsheets (Taken 9/26/2024 0417)  Plan of Care Reviewed With: patient  Overall Patient Progress: no change  Goal: Patient-Specific Goal (Individualized)  Description: You can add care plan individualizations to a care plan. Examples of Individualization might be:  \"Parent requests to be called daily at 9am for status\", \"I have a hard time hearing out of my right ear\", or \"Do not touch me to wake me up as it startles  me\".  Outcome: Progressing  Goal: Absence of Hospital-Acquired Illness or Injury  Outcome: Progressing  Intervention: Identify and Manage Fall Risk  Recent Flowsheet Documentation  Taken 9/25/2024 2200 by Aravind Barbosa RN  Safety Promotion/Fall Prevention:   activity supervised   assistive device/personal items within reach   clutter free environment maintained   increased rounding and observation   nonskid shoes/slippers when out of bed   safety round/check completed  Intervention: Prevent and Manage VTE (Venous Thromboembolism) Risk  Recent Flowsheet Documentation  Taken 9/25/2024 2200 by Aravind Barbosa RN  VTE Prevention/Management: SCDs on (sequential compression devices)  Intervention: Prevent Infection  Recent " Flowsheet Documentation  Taken 9/25/2024 2200 by Aravind Barbosa, RN  Infection Prevention: hand hygiene promoted  Goal: Optimal Comfort and Wellbeing  Outcome: Progressing  Intervention: Monitor Pain and Promote Comfort  Recent Flowsheet Documentation  Taken 9/25/2024 2237 by Aravind Barbosa, RN  Pain Management Interventions: medication (see MAR)  Goal: Readiness for Transition of Care  Outcome: Progressing  Intervention: Mutually Develop Transition Plan  Recent Flowsheet Documentation  Taken 9/25/2024 2100 by Aravind Barbosa, RN  Equipment Currently Used at Home: none

## 2024-09-26 NOTE — PROGRESS NOTES
[facility]    Hospitalist Progress Note      Assessment & Plan   Ozzie Olmedo is a 43 year old male patient with past medical history of diabetes mellitus type 2, hypertension, hyperlipidemia, history of pancreatitis, came to emergency room with a complaint of abdominal pain and vomiting.      #Recurrent alcohol-induced pancreatitis: He has prior history of pancreatitis in 2020 requiring hospitalization.  He states that he was drinking alcohol and his last alcohol intake was on Monday.  On arrival to emergency room, his initial vital signs showed temperature 98.6, pulse 85, blood pressure 166/105, oxygen saturation 98% on room air.  Laboratory workup showed normal BMP and LFTs.  Lipase 105.  CBC unremarkable.  CT of the abdomen/pelvis showed acute interstitial edematous focal pancreatitis without fluid collections.  -ER, he was given IV fluid bolus, IV Dilaudid.  He was admitted to the hospital for further management.  -Pt with ongoing abdominal pain/discomfort.  Will adjust pain meds to dilaudid 0.5 mg every 2 hours prn for severe pain, 0.3 mg for moderate pain.  Increase range of oxycodone to 5-10 mg every 4hours prn.  Pt is chronically taking opiates and likely has tolerance.  -Hold off on starting diet for now. As pain improves will start CLD. Hopefully tomorrow.  -Continue IVF.  Switch to LR after this bag of NS is completed.     Addendum: Pt not getting any relief from pain regimen.  Rated pain 8/10.  He is opioid tolerant.  Trialed switching to morphine without relief. Increased dilaudid to 0.5 mg every 2 hours prn and oxycodone as above without relief.  Will start dilaudid PCA.  No continuous rate. 0.2 mg PCA dose with 15 minute lockout period. Discussed with nursing.      #Diabetes mellitus type 2: On metformin.  He also stated that he uses insulin sliding scale as needed.  Start sliding scale insulin once he starts diet.   #Hypertension: Will resume losartan  #Hyperlipidemia: Will  resume rosuvastatin  #GERD, gastric ulcer: PPI  #Obesity: Complicates cares     DVT Prophylaxis: Pneumatic Compression Devices  Code Status: Full Code  Medically Ready for Discharge: Anticipated in 2-4 Days    I did offer to call patient's family but he prefers to update his wife himself.     Terry Hall MD    Interval History   No events. Still with epigastric abdominal pain. Tells me he was drinking Sunday and Monday with football games. No CP, SOB. No fevers/chills.     -Data reviewed today: I reviewed all new labs and imaging results over the last 24 hours. I personally reviewed     Physical Exam   Temp: 97.8  F (36.6  C) Temp src: Temporal BP: (!) 153/95 Pulse: 54   Resp: 16 SpO2: 98 % O2 Device: None (Room air)    Vitals:    09/25/24 1534   Weight: 117.1 kg (258 lb 2.5 oz)     Vital Signs with Ranges  Temp:  [97.8  F (36.6  C)-98.6  F (37  C)] 97.8  F (36.6  C)  Pulse:  [54-87] 54  Resp:  [16-18] 16  BP: (152-172)/() 153/95  SpO2:  [94 %-98 %] 98 %  No intake/output data recorded.    Constitutional: Nontoxic, NAD. Obese. Sleeping when I enter the room.   HEENT: Normocephalic. MMM, No elevation of JVD noted.   Respiratory: Nl WOB, Clear bilaterally, No wheezes or crackles  Cardiovascular: Regular, no murmur  GI: Obese, Tender in epigastric region. No rebound or guarding.   Skin/Integumen: WWP, no rash. No edema  Neuro: CNII-XII intact. Moves all extremities. No tremor. A&Ox3.    Medications   Current Facility-Administered Medications   Medication Dose Route Frequency Provider Last Rate Last Admin    sodium chloride 0.9 % infusion   Intravenous Continuous Nila Michelle  mL/hr at 09/26/24 0756 New Bag at 09/26/24 0756     Current Facility-Administered Medications   Medication Dose Route Frequency Provider Last Rate Last Admin    gabapentin (NEURONTIN) capsule 1,200 mg  1,200 mg Oral TID Nila Michelle MD   1,200 mg at 09/26/24 0755    losartan (COZAAR) tablet 50 mg  50 mg Oral  Daily Nila Michelle MD   50 mg at 09/26/24 0755    pantoprazole (PROTONIX) IV push injection 40 mg  40 mg Intravenous Daily with breakfast Nila Michelle MD   40 mg at 09/26/24 0756    rosuvastatin (CRESTOR) tablet 40 mg  40 mg Oral Daily Nila Michelle MD   40 mg at 09/25/24 2346    sodium chloride (PF) 0.9% PF flush 3 mL  3 mL Intracatheter Q8H Nila Michelle MD   3 mL at 09/26/24 0509       Data   Recent Labs   Lab 09/26/24  0956 09/26/24  0605 09/26/24  0157 09/25/24  2132 09/25/24  1549   WBC  --  9.8  --   --  7.9   HGB  --  14.3  --   --  15.2   MCV  --  94  --   --  93   PLT  --  220  --   --  253   NA  --  138  --   --  137   POTASSIUM  --  4.0  --   --  3.8   CHLORIDE  --  101  --   --  98   CO2  --  23  --   --  23   BUN  --  10.9  --   --  11.0   CR  --  0.93  --   --  0.94   ANIONGAP  --  14  --   --  16*   SYLVESTER  --  8.3*  --   --  8.9   * 132* 121*   < > 138*   ALBUMIN  --  4.1  --   --  4.4   PROTTOTAL  --  6.2*  --   --  6.8   BILITOTAL  --  0.6  --   --  0.8   ALKPHOS  --  87  --   --  98   ALT  --  32  --   --  40   AST  --  23  --   --  26   LIPASE  --  109*  --   --  105*    < > = values in this interval not displayed.       Recent Results (from the past 24 hour(s))   US Abdomen Limited    Narrative    EXAM: US ABDOMEN LIMITED  LOCATION: [facility]  DATE: 9/25/2024    INDICATION: Pain, tenderness, lipase elevation.  COMPARISON: Ultrasound 7/8/2020.  TECHNIQUE: Limited abdominal ultrasound.    FINDINGS:    GALLBLADDER: There is slight gallbladder wall thickening measuring up to 3.4 mm. The gallbladder is otherwise normal with no stones, sludge, pericholecystic fluid, or positive Ray sign.    BILE DUCTS: No biliary dilatation. The common duct measures 4.6 mm.    LIVER: Advanced increased echogenicity of the liver parenchyma most typical for fatty infiltration. No focal mass.    RIGHT KIDNEY: No hydronephrosis.    PANCREAS: The  majority of the pancreas is not well seen given overlying bowel gas.    No ascites.      Impression    IMPRESSION:  1.  Advanced fatty infiltration of the liver.    2.  Mild gallbladder wall thickening at 3.4 mm, the gallbladder is otherwise normal in appearance.    3.  Normal right kidney and bile ducts.    4.  The majority of the pancreas is not well seen given overlying bowel gas.    5.  Since 7/8/2020, there has been no significant changes.       CT Abdomen Pelvis w Contrast    Narrative    EXAM: CT ABDOMEN PELVIS W CONTRAST  LOCATION: [facility]  DATE: 9/25/2024    INDICATION: abdominal pain, mostly upper, had pancreatitis once but lipase is only a little elevated, not clear etiology on US  COMPARISON: 7/7/2020  TECHNIQUE: CT scan of the abdomen and pelvis was performed following injection of IV contrast. Multiplanar reformats were obtained. Dose reduction techniques were used.  CONTRAST: 100 mL Isovue 370    FINDINGS:   LOWER CHEST: Chronic lingular atelectasis.    HEPATOBILIARY: Diffuse hepatic steatosis.    PANCREAS: Peripancreatic inflammatory changes involving the head and uncinate process with relative sparing of the body and tail. No fluid collections. No CT evidence of necrosis.    SPLEEN: Unremarkable    ADRENAL GLANDS: Unremarkable    KIDNEYS/BLADDER: No hydronephrosis. Normal bladder contour.    BOWEL: Stranding and mild reactive wall thickening involving the duodenum. Normal caliber appendix    LYMPH NODES: No significant retroperitoneal adenopathy.    VASCULATURE: No abdominal aortic aneurysm    PELVIC ORGANS: No free fluid    MUSCULOSKELETAL: Bilateral L5 pars defects with grade 1 anterolisthesis.      Impression    IMPRESSION:   1.  Acute interstitial edematous focal pancreatitis without fluid collections.

## 2024-09-26 NOTE — PROVIDER NOTIFICATION
Allie page: Pt still consistently 8/10 pain-no relief with any of the changes so far, anything else we can try? thx

## 2024-09-27 LAB
ANION GAP SERPL CALCULATED.3IONS-SCNC: 15 MMOL/L (ref 7–15)
BUN SERPL-MCNC: 9.4 MG/DL (ref 6–20)
CALCIUM SERPL-MCNC: 8.8 MG/DL (ref 8.8–10.4)
CHLORIDE SERPL-SCNC: 98 MMOL/L (ref 98–107)
CREAT SERPL-MCNC: 0.85 MG/DL (ref 0.67–1.17)
EGFRCR SERPLBLD CKD-EPI 2021: >90 ML/MIN/1.73M2
GLUCOSE SERPL-MCNC: 108 MG/DL (ref 70–99)
HCO3 SERPL-SCNC: 22 MMOL/L (ref 22–29)
LIPASE SERPL-CCNC: 125 U/L (ref 13–60)
MAGNESIUM SERPL-MCNC: 1.4 MG/DL (ref 1.7–2.3)
POTASSIUM SERPL-SCNC: 3.9 MMOL/L (ref 3.4–5.3)
SODIUM SERPL-SCNC: 135 MMOL/L (ref 135–145)

## 2024-09-27 PROCEDURE — 250N000009 HC RX 250: Performed by: INTERNAL MEDICINE

## 2024-09-27 PROCEDURE — 250N000013 HC RX MED GY IP 250 OP 250 PS 637: Performed by: INTERNAL MEDICINE

## 2024-09-27 PROCEDURE — 83690 ASSAY OF LIPASE: CPT | Performed by: HOSPITALIST

## 2024-09-27 PROCEDURE — 36415 COLL VENOUS BLD VENIPUNCTURE: CPT | Performed by: HOSPITALIST

## 2024-09-27 PROCEDURE — 250N000013 HC RX MED GY IP 250 OP 250 PS 637: Performed by: HOSPITALIST

## 2024-09-27 PROCEDURE — 258N000003 HC RX IP 258 OP 636: Performed by: HOSPITALIST

## 2024-09-27 PROCEDURE — 99232 SBSQ HOSP IP/OBS MODERATE 35: CPT | Performed by: HOSPITALIST

## 2024-09-27 PROCEDURE — 120N000001 HC R&B MED SURG/OB

## 2024-09-27 PROCEDURE — 250N000011 HC RX IP 250 OP 636: Performed by: HOSPITALIST

## 2024-09-27 PROCEDURE — 82947 ASSAY GLUCOSE BLOOD QUANT: CPT | Performed by: HOSPITALIST

## 2024-09-27 PROCEDURE — 83735 ASSAY OF MAGNESIUM: CPT | Performed by: HOSPITALIST

## 2024-09-27 PROCEDURE — 80048 BASIC METABOLIC PNL TOTAL CA: CPT | Performed by: HOSPITALIST

## 2024-09-27 RX ORDER — MAGNESIUM SULFATE HEPTAHYDRATE 40 MG/ML
2 INJECTION, SOLUTION INTRAVENOUS ONCE
Status: COMPLETED | OUTPATIENT
Start: 2024-09-27 | End: 2024-09-27

## 2024-09-27 RX ORDER — CELECOXIB 100 MG/1
100 CAPSULE ORAL 2 TIMES DAILY
Status: DISCONTINUED | OUTPATIENT
Start: 2024-09-27 | End: 2024-10-01 | Stop reason: HOSPADM

## 2024-09-27 RX ORDER — HYDROMORPHONE HYDROCHLORIDE 1 MG/ML
0.3 INJECTION, SOLUTION INTRAMUSCULAR; INTRAVENOUS; SUBCUTANEOUS
Status: DISCONTINUED | OUTPATIENT
Start: 2024-09-27 | End: 2024-10-01

## 2024-09-27 RX ORDER — CYCLOBENZAPRINE HCL 10 MG
10 TABLET ORAL 3 TIMES DAILY PRN
Status: DISCONTINUED | OUTPATIENT
Start: 2024-09-27 | End: 2024-10-01 | Stop reason: HOSPADM

## 2024-09-27 RX ORDER — HYDROMORPHONE HYDROCHLORIDE 1 MG/ML
0.5 INJECTION, SOLUTION INTRAMUSCULAR; INTRAVENOUS; SUBCUTANEOUS
Status: DISCONTINUED | OUTPATIENT
Start: 2024-09-27 | End: 2024-10-01

## 2024-09-27 RX ADMIN — SODIUM CHLORIDE, POTASSIUM CHLORIDE, SODIUM LACTATE AND CALCIUM CHLORIDE: 600; 310; 30; 20 INJECTION, SOLUTION INTRAVENOUS at 07:55

## 2024-09-27 RX ADMIN — GABAPENTIN 1200 MG: 400 CAPSULE ORAL at 08:01

## 2024-09-27 RX ADMIN — NICOTINE POLACRILEX 2 MG: 2 GUM, CHEWING BUCCAL at 13:19

## 2024-09-27 RX ADMIN — GABAPENTIN 1200 MG: 400 CAPSULE ORAL at 14:07

## 2024-09-27 RX ADMIN — NICOTINE POLACRILEX 2 MG: 2 GUM, CHEWING BUCCAL at 18:47

## 2024-09-27 RX ADMIN — CYCLOBENZAPRINE 10 MG: 10 TABLET, FILM COATED ORAL at 18:47

## 2024-09-27 RX ADMIN — HYDROMORPHONE HYDROCHLORIDE 0.5 MG: 1 INJECTION, SOLUTION INTRAMUSCULAR; INTRAVENOUS; SUBCUTANEOUS at 15:32

## 2024-09-27 RX ADMIN — HYDROXYZINE HYDROCHLORIDE 50 MG: 50 TABLET, FILM COATED ORAL at 01:27

## 2024-09-27 RX ADMIN — GABAPENTIN 1200 MG: 400 CAPSULE ORAL at 20:17

## 2024-09-27 RX ADMIN — NICOTINE POLACRILEX 2 MG: 2 GUM, CHEWING BUCCAL at 11:07

## 2024-09-27 RX ADMIN — NICOTINE POLACRILEX 2 MG: 2 GUM, CHEWING BUCCAL at 05:36

## 2024-09-27 RX ADMIN — LOSARTAN POTASSIUM 50 MG: 50 TABLET, FILM COATED ORAL at 08:02

## 2024-09-27 RX ADMIN — OXYCODONE HYDROCHLORIDE 10 MG: 5 TABLET ORAL at 20:41

## 2024-09-27 RX ADMIN — CELECOXIB 100 MG: 100 CAPSULE ORAL at 20:17

## 2024-09-27 RX ADMIN — ROSUVASTATIN CALCIUM 40 MG: 20 TABLET, FILM COATED ORAL at 20:17

## 2024-09-27 RX ADMIN — PANTOPRAZOLE SODIUM 40 MG: 40 INJECTION, POWDER, FOR SOLUTION INTRAVENOUS at 08:02

## 2024-09-27 RX ADMIN — HYDROMORPHONE HYDROCHLORIDE 0.3 MG: 1 INJECTION, SOLUTION INTRAMUSCULAR; INTRAVENOUS; SUBCUTANEOUS at 13:16

## 2024-09-27 RX ADMIN — NICOTINE POLACRILEX 2 MG: 2 GUM, CHEWING BUCCAL at 20:43

## 2024-09-27 RX ADMIN — TIZANIDINE 4 MG: 4 TABLET ORAL at 22:44

## 2024-09-27 RX ADMIN — MAGNESIUM SULFATE HEPTAHYDRATE 2 G: 40 INJECTION, SOLUTION INTRAVENOUS at 18:16

## 2024-09-27 RX ADMIN — NICOTINE POLACRILEX 2 MG: 2 GUM, CHEWING BUCCAL at 09:33

## 2024-09-27 RX ADMIN — OXYCODONE HYDROCHLORIDE 5 MG: 5 TABLET ORAL at 16:35

## 2024-09-27 RX ADMIN — NICOTINE POLACRILEX 2 MG: 2 GUM, CHEWING BUCCAL at 18:15

## 2024-09-27 RX ADMIN — SODIUM CHLORIDE, POTASSIUM CHLORIDE, SODIUM LACTATE AND CALCIUM CHLORIDE: 600; 310; 30; 20 INJECTION, SOLUTION INTRAVENOUS at 20:46

## 2024-09-27 RX ADMIN — HYDROMORPHONE HYDROCHLORIDE 0.5 MG: 1 INJECTION, SOLUTION INTRAMUSCULAR; INTRAVENOUS; SUBCUTANEOUS at 18:13

## 2024-09-27 RX ADMIN — HYDROMORPHONE HYDROCHLORIDE 0.5 MG: 1 INJECTION, SOLUTION INTRAMUSCULAR; INTRAVENOUS; SUBCUTANEOUS at 22:44

## 2024-09-27 ASSESSMENT — ACTIVITIES OF DAILY LIVING (ADL)
ADLS_ACUITY_SCORE: 20

## 2024-09-27 NOTE — PLAN OF CARE
Goal Outcome Evaluation:      Plan of Care Reviewed With: patient    Overall Patient Progress: no changeOverall Patient Progress: no change         [id number]  RA. Capno in place. Independent. Npo. Pain less in abdomen. Pt reports pain decreases to a 6/10 after using PCA pump PRN atarax 0127. PCA pump.   Pt refused capno monitoring in the AM after his shower. VSS htn. Continuous pulse ox   Discharge home tbd          Problem: Adult Inpatient Plan of Care  Goal: Plan of Care Review  Description: The Plan of Care Review/Shift note should be completed every shift.  The Outcome Evaluation is a brief statement about your assessment that the patient is improving, declining, or no change.  This information will be displayed automatically on your shift  note.  Recent Flowsheet Documentation  Taken 9/27/2024 0307 by Aravind Barbosa RN  Plan of Care Reviewed With: patient  Overall Patient Progress: no change  Goal: Absence of Hospital-Acquired Illness or Injury  Intervention: Identify and Manage Fall Risk  Recent Flowsheet Documentation  Taken 9/27/2024 0127 by Aravind Barbosa RN  Safety Promotion/Fall Prevention: safety round/check completed  Intervention: Prevent Skin Injury  Recent Flowsheet Documentation  Taken 9/27/2024 0127 by Aravind Barbosa RN  Body Position: position changed independently  Intervention: Prevent and Manage VTE (Venous Thromboembolism) Risk  Recent Flowsheet Documentation  Taken 9/27/2024 0127 by Aravind Barbosa RN  VTE Prevention/Management: SCDs off (sequential compression devices)  Intervention: Prevent Infection  Recent Flowsheet Documentation  Taken 9/27/2024 0127 by Aravind Barbosa RN  Infection Prevention: single patient room provided  Goal: Optimal Comfort and Wellbeing  Intervention: Monitor Pain and Promote Comfort  Recent Flowsheet Documentation  Taken 9/27/2024 0127 by Aravind Barbosa RN  Pain Management Interventions: medication (see MAR)

## 2024-09-27 NOTE — PLAN OF CARE
"Goal Outcome Evaluation:      Plan of Care Reviewed With: patient    Overall Patient Progress: improvingOverall Patient Progress: improving    Outcome Evaluation: pt has PCA pump and his abdominal pain improved.pt is on RA. ind. voiding.cont NPO.IV CDI. LR 75 ml/hrs infusing.No SOB. No N/V. BS active X4.      Problem: Adult Inpatient Plan of Care  Goal: Plan of Care Review  Description: The Plan of Care Review/Shift note should be completed every shift.  The Outcome Evaluation is a brief statement about your assessment that the patient is improving, declining, or no change.  This information will be displayed automatically on your shift  note.  Outcome: Progressing  Flowsheets (Taken 9/26/2024 2228)  Outcome Evaluation: pt has PCA pump and his abdominal pain improved.pt is on RA. ind. voiding.cont NPO.IV CDI. LR 75 ml/hrs infusing.No SOB. No N/V. BS active X4.  Plan of Care Reviewed With: patient  Overall Patient Progress: improving  Goal: Patient-Specific Goal (Individualized)  Description: You can add care plan individualizations to a care plan. Examples of Individualization might be:  \"Parent requests to be called daily at 9am for status\", \"I have a hard time hearing out of my right ear\", or \"Do not touch me to wake me up as it startles  me\".  Outcome: Progressing  Goal: Absence of Hospital-Acquired Illness or Injury  Outcome: Progressing  Intervention: Identify and Manage Fall Risk  Recent Flowsheet Documentation  Taken 9/26/2024 2200 by Radha Tristan RN  Safety Promotion/Fall Prevention:   activity supervised   assistive device/personal items within reach   clutter free environment maintained   increased rounding and observation   nonskid shoes/slippers when out of bed   safety round/check completed  Intervention: Prevent and Manage VTE (Venous Thromboembolism) Risk  Recent Flowsheet Documentation  Taken 9/26/2024 2200 by Radha Tristan RN  VTE Prevention/Management: SCDs off (sequential compression " devices)  Intervention: Prevent Infection  Recent Flowsheet Documentation  Taken 9/26/2024 2200 by Radha Tristan RN  Infection Prevention: single patient room provided  Goal: Optimal Comfort and Wellbeing  Outcome: Progressing  Intervention: Monitor Pain and Promote Comfort  Recent Flowsheet Documentation  Taken 9/26/2024 2054 by Radha Tristan RN  Pain Management Interventions: medication (see MAR)  Goal: Readiness for Transition of Care  Outcome: Progressing     Problem: Pain Acute  Goal: Optimal Pain Control and Function  Outcome: Progressing  Intervention: Develop Pain Management Plan  Recent Flowsheet Documentation  Taken 9/26/2024 2054 by Radha Tristan RN  Pain Management Interventions: medication (see MAR)  Intervention: Prevent or Manage Pain  Recent Flowsheet Documentation  Taken 9/26/2024 2200 by Radha Tristan RN  Medication Review/Management: medications reviewed     Problem: Pancreatitis  Goal: Fluid and Electrolyte Balance  Outcome: Progressing  Goal: Absence of Infection Signs and Symptoms  Outcome: Progressing  Goal: Optimal Nutrition Delivery  Outcome: Progressing  Goal: Optimal Pain Control and Function  Outcome: Progressing  Intervention: Monitor and Manage Pain  Recent Flowsheet Documentation  Taken 9/26/2024 2054 by Radha Tristan RN  Pain Management Interventions: medication (see MAR)  Goal: Effective Oxygenation and Ventilation  Outcome: Progressing  Intervention: Optimize Oxygenation and Ventilation  Recent Flowsheet Documentation  Taken 9/26/2024 2200 by Radha Tristan RN  Cough And Deep Breathing: done independently per patient

## 2024-09-27 NOTE — PROGRESS NOTES
[facility]    Hospitalist Progress Note      Assessment & Plan   Ozzie Olmedo is a 43 year old male patient with past medical history of diabetes mellitus type 2, hypertension, hyperlipidemia, history of pancreatitis, came to emergency room with a complaint of abdominal pain and vomiting.       #Recurrent alcohol-induced pancreatitis: He has prior history of pancreatitis in 2020 requiring hospitalization.  He states that he was drinking alcohol and his last alcohol intake was on Monday.  On arrival to emergency room, his initial vital signs showed temperature 98.6, pulse 85, blood pressure 166/105, oxygen saturation 98% on room air.  Laboratory workup showed normal BMP and LFTs.  Lipase 105.  CBC unremarkable.  CT of the abdomen/pelvis showed acute interstitial edematous focal pancreatitis without fluid collections.  -ER, he was given IV fluid bolus, IV Dilaudid.  He was admitted to the hospital for further management.  -Pt with significant pain on 09/26.  He was trialed on morphine instead of dilaudid without improvement.  Increased dose of dilaudid with added atarax as adjuvant without significant improvement.  He was placed on dilaudid PCA and thinks he feels a bit better.  Still some abdominal discomfort in upper abdomen but improved. He is not tachycardic and HDS.  -Lipase rechecked and stable.  Not significantly elevated.  -Will trial CLD today. Transition off PCA today with 0.5 mg dilaudid every 2 hours prn for severe pain, 0.3 mg every 2 hours for moderate pain.  Oral oxycodone also available.  He is continuing on his home high dose gabepentin. Add prn atarax as adjuvant.    -Continue IVF.  Switch to LR after this bag of NS is completed.     #Chronic shoulder pain: Pt notes he has chronic pain that is managed by PCP. In review of PDMP, he has been getting 30 pills of 5 mg oxycodone the last 2 months from his PCP.  He is on chronic gabapentin 1200 mg TID.      #Diabetes mellitus type  2: On metformin.  He also stated that he uses insulin sliding scale as needed.  Start sliding scale insulin once he starts diet.   #Hypertension: Will resume losartan  #Hyperlipidemia: Will resume rosuvastatin  #GERD, gastric ulcer: PPI  #Obesity: Complicates cares     DVT Prophylaxis: Pneumatic Compression Devices  Code Status: Full Code  Medically Ready for Discharge: Anticipated in 2-4 Days    Terry Hall MD    Interval History   Pain is better today. No fevers/chills. Wants to try clears.  No CP, SOB, fevers/chills. Lipase stable.     -Data reviewed today: I reviewed all new labs and imaging results over the last 24 hours. I personally reviewed     Physical Exam   Temp: 97.1  F (36.2  C) Temp src: Temporal BP: (!) 156/93 Pulse: 63   Resp: 18 SpO2: 98 % O2 Device: None (Room air)    Vitals:    09/25/24 1534   Weight: 117.1 kg (258 lb 2.5 oz)     Vital Signs with Ranges  Temp:  [97.1  F (36.2  C)-98.6  F (37  C)] 97.1  F (36.2  C)  Pulse:  [58-77] 63  Resp:  [16-20] 18  BP: (156-177)/() 156/93  SpO2:  [91 %-98 %] 98 %  I/O last 3 completed shifts:  In: 31 [I.V.:31]  Out: -     Constitutional: Nontoxic, NAD. Sitting up in chair.   HEENT: Normocephalic. MMM, No elevation of JVD noted.   Respiratory: Nl WOB, Clear bilaterally, No wheezes or crackles  Cardiovascular: Regular, no murmur  GI: Soft. Obese. Improved tenderness to palpation in epigastric region. No rebound or guarding.   Skin/Integumen: WWP, no rash. No edema  Neuro: CNII-XII intact. Moves all extremities. No tremor. A&Ox3.    Medications   Current Facility-Administered Medications   Medication Dose Route Frequency Provider Last Rate Last Admin    lactated ringers infusion   Intravenous Continuous Terry Hall  mL/hr at 09/27/24 0755 New Bag at 09/27/24 0755     Current Facility-Administered Medications   Medication Dose Route Frequency Provider Last Rate Last Admin    gabapentin (NEURONTIN) capsule 1,200 mg  1,200 mg Oral TID Jaleta, Cherinet  MD Austyn   1,200 mg at 09/27/24 0801    losartan (COZAAR) tablet 50 mg  50 mg Oral Daily Nila Michelle MD   50 mg at 09/27/24 0802    pantoprazole (PROTONIX) IV push injection 40 mg  40 mg Intravenous Daily with breakfast Nila Michelle MD   40 mg at 09/27/24 0802    polyethylene glycol (MIRALAX) Packet 17 g  17 g Oral Daily Terry Hall MD   17 g at 09/26/24 1659    rosuvastatin (CRESTOR) tablet 40 mg  40 mg Oral Daily Nila Michelle MD   40 mg at 09/26/24 1957    senna-docusate (SENOKOT-S/PERICOLACE) 8.6-50 MG per tablet 1 tablet  1 tablet Oral BID Terry Hall MD        Or    senna-docusate (SENOKOT-S/PERICOLACE) 8.6-50 MG per tablet 2 tablet  2 tablet Oral BID Terry Hall MD        sodium chloride (PF) 0.9% PF flush 3 mL  3 mL Intracatheter Q8H Nila Michelle MD   3 mL at 09/26/24 0509       Data   Recent Labs   Lab 09/27/24  0612 09/26/24  2201 09/26/24  1704 09/26/24  0956 09/26/24  0605 09/25/24  2132 09/25/24  1549   WBC  --   --   --   --  9.8  --  7.9   HGB  --   --   --   --  14.3  --  15.2   MCV  --   --   --   --  94  --  93   PLT  --   --   --   --  220  --  253     --   --   --  138  --  137   POTASSIUM 3.9  --   --   --  4.0  --  3.8   CHLORIDE 98  --   --   --  101  --  98   CO2 22  --   --   --  23  --  23   BUN 9.4  --   --   --  10.9  --  11.0   CR 0.85  --   --   --  0.93  --  0.94   ANIONGAP 15  --   --   --  14  --  16*   SYLVESTER 8.8  --   --   --  8.3*  --  8.9   * 109* 112*   < > 132*   < > 138*   ALBUMIN  --   --   --   --  4.1  --  4.4   PROTTOTAL  --   --   --   --  6.2*  --  6.8   BILITOTAL  --   --   --   --  0.6  --  0.8   ALKPHOS  --   --   --   --  87  --  98   ALT  --   --   --   --  32  --  40   AST  --   --   --   --  23  --  26   LIPASE 125*  --   --   --  109*  --  105*    < > = values in this interval not displayed.       No results found for this or any previous visit (from the past 24 hour(s)).

## 2024-09-27 NOTE — PLAN OF CARE
"Goal Outcome Evaluation:      Plan of Care Reviewed With: patient    Overall Patient Progress: improvingOverall Patient Progress: improving    Outcome Evaluation: pt A/O. npo. IV infusing    Patient is A/O X4. VS, on RA. has PCA pump. Independent in room. Voiding well. IV infusing.      PCA pump stopped by 2 RN's per order. Pt is on CLD. will continue monitoring. Mg 1.4, MD updated.     Problem: Adult Inpatient Plan of Care  Goal: Plan of Care Review  Description: The Plan of Care Review/Shift note should be completed every shift.  The Outcome Evaluation is a brief statement about your assessment that the patient is improving, declining, or no change.  This information will be displayed automatically on your shift  note.  9/27/2024 1122 by Nona Hamm RN  Outcome: Progressing  Flowsheets (Taken 9/27/2024 1122)  Plan of Care Reviewed With: patient  Overall Patient Progress: improving  9/27/2024 1121 by Nona Hamm RN  Outcome: Progressing  Flowsheets (Taken 9/27/2024 1121)  Outcome Evaluation: pt A/O. npo. IV infusing  Plan of Care Reviewed With: patient  Overall Patient Progress: improving  Goal: Patient-Specific Goal (Individualized)  Description: You can add care plan individualizations to a care plan. Examples of Individualization might be:  \"Parent requests to be called daily at 9am for status\", \"I have a hard time hearing out of my right ear\", or \"Do not touch me to wake me up as it startles  me\".  9/27/2024 1122 by Nona Hamm RN  Outcome: Progressing  9/27/2024 1121 by Nona Hamm RN  Outcome: Progressing  Goal: Absence of Hospital-Acquired Illness or Injury  9/27/2024 1122 by Nona Hamm, RN  Outcome: Progressing  9/27/2024 1121 by Nona aHmm RN  Outcome: Progressing  Intervention: Identify and Manage Fall Risk  Recent Flowsheet Documentation  Taken 9/27/2024 1006 by Nona Hamm, RN  Safety Promotion/Fall Prevention:   safety round/check completed   activity " supervised  Intervention: Prevent Skin Injury  Recent Flowsheet Documentation  Taken 9/27/2024 1006 by Nona Hamm RN  Body Position: position changed independently  Intervention: Prevent and Manage VTE (Venous Thromboembolism) Risk  Recent Flowsheet Documentation  Taken 9/27/2024 1006 by Nona Hamm RN  VTE Prevention/Management: SCDs off (sequential compression devices)  Intervention: Prevent Infection  Recent Flowsheet Documentation  Taken 9/27/2024 1006 by Nona Hamm RN  Infection Prevention: single patient room provided  Goal: Optimal Comfort and Wellbeing  9/27/2024 1122 by Nona Hamm RN  Outcome: Progressing  9/27/2024 1121 by Nona Hamm RN  Outcome: Progressing  Intervention: Monitor Pain and Promote Comfort  Recent Flowsheet Documentation  Taken 9/27/2024 0801 by Nona Hamm RN  Pain Management Interventions: medication (see MAR)  Goal: Readiness for Transition of Care  9/27/2024 1122 by Nona Hamm RN  Outcome: Progressing  9/27/2024 1121 by Nona Hamm RN  Outcome: Progressing     Problem: Pain Acute  Goal: Optimal Pain Control and Function  9/27/2024 1122 by Nona Hamm RN  Outcome: Progressing  9/27/2024 1121 by Nona Hamm RN  Outcome: Progressing  Intervention: Develop Pain Management Plan  Recent Flowsheet Documentation  Taken 9/27/2024 0801 by Nona Hamm RN  Pain Management Interventions: medication (see MAR)  Intervention: Prevent or Manage Pain  Recent Flowsheet Documentation  Taken 9/27/2024 1006 by Nona Hamm RN  Medication Review/Management: medications reviewed     Problem: Pancreatitis  Goal: Fluid and Electrolyte Balance  9/27/2024 1122 by Nona Hamm RN  Outcome: Progressing  9/27/2024 1121 by Nona Hamm RN  Outcome: Progressing  Goal: Absence of Infection Signs and Symptoms  9/27/2024 1122 by Nona Hamm RN  Outcome: Progressing  9/27/2024 1121 by Nona Hamm RN  Outcome: Progressing  Intervention: Prevent or Manage  Infection  Recent Flowsheet Documentation  Taken 9/27/2024 1006 by Nona Hamm, RN  Infection Management: aseptic technique maintained  Goal: Optimal Nutrition Delivery  9/27/2024 1122 by Nona Hamm RN  Outcome: Progressing  9/27/2024 1121 by Nona Hamm RN  Outcome: Progressing  Goal: Optimal Pain Control and Function  9/27/2024 1122 by Nona Hamm RN  Outcome: Progressing  9/27/2024 1121 by Nona Hamm RN  Outcome: Progressing  Intervention: Monitor and Manage Pain  Recent Flowsheet Documentation  Taken 9/27/2024 0801 by Nona Hamm RN  Pain Management Interventions: medication (see MAR)  Goal: Effective Oxygenation and Ventilation  9/27/2024 1122 by Nona Hamm RN  Outcome: Progressing  9/27/2024 1121 by Nona Hamm RN  Outcome: Progressing  Intervention: Optimize Oxygenation and Ventilation  Recent Flowsheet Documentation  Taken 9/27/2024 1006 by Nona Hamm RN  Cough And Deep Breathing: done independently per patient  Activity Management: activity adjusted per tolerance

## 2024-09-28 LAB
ALBUMIN SERPL BCG-MCNC: 4.1 G/DL (ref 3.5–5.2)
ALP SERPL-CCNC: 86 U/L (ref 40–150)
ALT SERPL W P-5'-P-CCNC: 31 U/L (ref 0–70)
ANION GAP SERPL CALCULATED.3IONS-SCNC: 15 MMOL/L (ref 7–15)
AST SERPL W P-5'-P-CCNC: 32 U/L (ref 0–45)
BILIRUB SERPL-MCNC: 0.5 MG/DL
BUN SERPL-MCNC: 6.2 MG/DL (ref 6–20)
CALCIUM SERPL-MCNC: 8.8 MG/DL (ref 8.8–10.4)
CHLORIDE SERPL-SCNC: 98 MMOL/L (ref 98–107)
CREAT SERPL-MCNC: 0.84 MG/DL (ref 0.67–1.17)
EGFRCR SERPLBLD CKD-EPI 2021: >90 ML/MIN/1.73M2
ERYTHROCYTE [DISTWIDTH] IN BLOOD BY AUTOMATED COUNT: 12.6 % (ref 10–15)
GLUCOSE BLDC GLUCOMTR-MCNC: 112 MG/DL (ref 70–99)
GLUCOSE BLDC GLUCOMTR-MCNC: 139 MG/DL (ref 70–99)
GLUCOSE BLDC GLUCOMTR-MCNC: 94 MG/DL (ref 70–99)
GLUCOSE SERPL-MCNC: 95 MG/DL (ref 70–99)
GLUCOSE SERPL-MCNC: 95 MG/DL (ref 70–99)
HCO3 SERPL-SCNC: 25 MMOL/L (ref 22–29)
HCT VFR BLD AUTO: 40 % (ref 40–53)
HGB BLD-MCNC: 13.9 G/DL (ref 13.3–17.7)
MAGNESIUM SERPL-MCNC: 1.5 MG/DL (ref 1.7–2.3)
MAGNESIUM SERPL-MCNC: 2 MG/DL (ref 1.7–2.3)
MCH RBC QN AUTO: 32.5 PG (ref 26.5–33)
MCHC RBC AUTO-ENTMCNC: 34.8 G/DL (ref 31.5–36.5)
MCV RBC AUTO: 94 FL (ref 78–100)
PLATELET # BLD AUTO: 209 10E3/UL (ref 150–450)
POTASSIUM SERPL-SCNC: 3.9 MMOL/L (ref 3.4–5.3)
POTASSIUM SERPL-SCNC: 4.1 MMOL/L (ref 3.4–5.3)
PROT SERPL-MCNC: 6.4 G/DL (ref 6.4–8.3)
RBC # BLD AUTO: 4.28 10E6/UL (ref 4.4–5.9)
SODIUM SERPL-SCNC: 138 MMOL/L (ref 135–145)
WBC # BLD AUTO: 7.6 10E3/UL (ref 4–11)

## 2024-09-28 PROCEDURE — 250N000013 HC RX MED GY IP 250 OP 250 PS 637: Performed by: HOSPITALIST

## 2024-09-28 PROCEDURE — 250N000013 HC RX MED GY IP 250 OP 250 PS 637: Performed by: INTERNAL MEDICINE

## 2024-09-28 PROCEDURE — 36415 COLL VENOUS BLD VENIPUNCTURE: CPT | Performed by: HOSPITALIST

## 2024-09-28 PROCEDURE — 250N000009 HC RX 250: Performed by: INTERNAL MEDICINE

## 2024-09-28 PROCEDURE — 99232 SBSQ HOSP IP/OBS MODERATE 35: CPT | Performed by: HOSPITALIST

## 2024-09-28 PROCEDURE — 120N000001 HC R&B MED SURG/OB

## 2024-09-28 PROCEDURE — 258N000003 HC RX IP 258 OP 636: Performed by: HOSPITALIST

## 2024-09-28 PROCEDURE — 83735 ASSAY OF MAGNESIUM: CPT | Performed by: HOSPITALIST

## 2024-09-28 PROCEDURE — 85027 COMPLETE CBC AUTOMATED: CPT | Performed by: HOSPITALIST

## 2024-09-28 PROCEDURE — 84132 ASSAY OF SERUM POTASSIUM: CPT | Performed by: HOSPITALIST

## 2024-09-28 PROCEDURE — 250N000011 HC RX IP 250 OP 636: Performed by: HOSPITALIST

## 2024-09-28 RX ORDER — SIMETHICONE 80 MG
80 TABLET,CHEWABLE ORAL EVERY 6 HOURS PRN
Status: DISCONTINUED | OUTPATIENT
Start: 2024-09-28 | End: 2024-10-01 | Stop reason: HOSPADM

## 2024-09-28 RX ORDER — NICOTINE POLACRILEX 4 MG
15-30 LOZENGE BUCCAL
Status: DISCONTINUED | OUTPATIENT
Start: 2024-09-28 | End: 2024-10-01 | Stop reason: HOSPADM

## 2024-09-28 RX ORDER — MAGNESIUM SULFATE HEPTAHYDRATE 40 MG/ML
4 INJECTION, SOLUTION INTRAVENOUS ONCE
Status: COMPLETED | OUTPATIENT
Start: 2024-09-28 | End: 2024-09-28

## 2024-09-28 RX ORDER — DEXTROSE MONOHYDRATE 25 G/50ML
25-50 INJECTION, SOLUTION INTRAVENOUS
Status: DISCONTINUED | OUTPATIENT
Start: 2024-09-28 | End: 2024-10-01 | Stop reason: HOSPADM

## 2024-09-28 RX ADMIN — GABAPENTIN 1200 MG: 400 CAPSULE ORAL at 08:44

## 2024-09-28 RX ADMIN — CYCLOBENZAPRINE 10 MG: 10 TABLET, FILM COATED ORAL at 09:20

## 2024-09-28 RX ADMIN — NICOTINE POLACRILEX 2 MG: 2 GUM, CHEWING BUCCAL at 12:39

## 2024-09-28 RX ADMIN — NICOTINE POLACRILEX 2 MG: 2 GUM, CHEWING BUCCAL at 15:25

## 2024-09-28 RX ADMIN — HYDROMORPHONE HYDROCHLORIDE 0.5 MG: 1 INJECTION, SOLUTION INTRAMUSCULAR; INTRAVENOUS; SUBCUTANEOUS at 08:44

## 2024-09-28 RX ADMIN — ROSUVASTATIN CALCIUM 40 MG: 20 TABLET, FILM COATED ORAL at 19:40

## 2024-09-28 RX ADMIN — NICOTINE POLACRILEX 2 MG: 2 GUM, CHEWING BUCCAL at 02:04

## 2024-09-28 RX ADMIN — NICOTINE POLACRILEX 2 MG: 2 GUM, CHEWING BUCCAL at 10:56

## 2024-09-28 RX ADMIN — MAGNESIUM SULFATE HEPTAHYDRATE 4 G: 40 INJECTION, SOLUTION INTRAVENOUS at 15:25

## 2024-09-28 RX ADMIN — CELECOXIB 100 MG: 100 CAPSULE ORAL at 19:40

## 2024-09-28 RX ADMIN — SODIUM CHLORIDE, POTASSIUM CHLORIDE, SODIUM LACTATE AND CALCIUM CHLORIDE: 600; 310; 30; 20 INJECTION, SOLUTION INTRAVENOUS at 11:30

## 2024-09-28 RX ADMIN — PANTOPRAZOLE SODIUM 40 MG: 40 INJECTION, POWDER, FOR SOLUTION INTRAVENOUS at 08:45

## 2024-09-28 RX ADMIN — LOSARTAN POTASSIUM 50 MG: 50 TABLET, FILM COATED ORAL at 08:44

## 2024-09-28 RX ADMIN — OXYCODONE HYDROCHLORIDE 10 MG: 5 TABLET ORAL at 23:55

## 2024-09-28 RX ADMIN — NICOTINE POLACRILEX 2 MG: 2 GUM, CHEWING BUCCAL at 11:32

## 2024-09-28 RX ADMIN — HYDROXYZINE HYDROCHLORIDE 25 MG: 25 TABLET ORAL at 23:55

## 2024-09-28 RX ADMIN — OXYCODONE HYDROCHLORIDE 10 MG: 5 TABLET ORAL at 09:20

## 2024-09-28 RX ADMIN — CELECOXIB 100 MG: 100 CAPSULE ORAL at 08:44

## 2024-09-28 RX ADMIN — TIZANIDINE 4 MG: 4 TABLET ORAL at 22:15

## 2024-09-28 RX ADMIN — SENNOSIDES AND DOCUSATE SODIUM 1 TABLET: 8.6; 5 TABLET ORAL at 19:40

## 2024-09-28 RX ADMIN — NICOTINE POLACRILEX 2 MG: 2 GUM, CHEWING BUCCAL at 04:45

## 2024-09-28 RX ADMIN — OXYCODONE HYDROCHLORIDE 10 MG: 5 TABLET ORAL at 19:42

## 2024-09-28 RX ADMIN — SENNOSIDES AND DOCUSATE SODIUM 2 TABLET: 8.6; 5 TABLET ORAL at 08:44

## 2024-09-28 RX ADMIN — HYDROXYZINE HYDROCHLORIDE 25 MG: 25 TABLET ORAL at 15:24

## 2024-09-28 RX ADMIN — NICOTINE POLACRILEX 2 MG: 2 GUM, CHEWING BUCCAL at 14:16

## 2024-09-28 RX ADMIN — OXYCODONE HYDROCHLORIDE 10 MG: 5 TABLET ORAL at 02:11

## 2024-09-28 RX ADMIN — HYDROMORPHONE HYDROCHLORIDE 0.5 MG: 1 INJECTION, SOLUTION INTRAMUSCULAR; INTRAVENOUS; SUBCUTANEOUS at 04:43

## 2024-09-28 RX ADMIN — NICOTINE POLACRILEX 2 MG: 2 GUM, CHEWING BUCCAL at 16:52

## 2024-09-28 RX ADMIN — HYDROMORPHONE HYDROCHLORIDE 0.5 MG: 1 INJECTION, SOLUTION INTRAMUSCULAR; INTRAVENOUS; SUBCUTANEOUS at 16:51

## 2024-09-28 RX ADMIN — HYDROMORPHONE HYDROCHLORIDE 0.5 MG: 1 INJECTION, SOLUTION INTRAMUSCULAR; INTRAVENOUS; SUBCUTANEOUS at 10:54

## 2024-09-28 RX ADMIN — NICOTINE POLACRILEX 2 MG: 2 GUM, CHEWING BUCCAL at 19:41

## 2024-09-28 RX ADMIN — NICOTINE POLACRILEX 2 MG: 2 GUM, CHEWING BUCCAL at 09:24

## 2024-09-28 RX ADMIN — SODIUM CHLORIDE, POTASSIUM CHLORIDE, SODIUM LACTATE AND CALCIUM CHLORIDE: 600; 310; 30; 20 INJECTION, SOLUTION INTRAVENOUS at 04:41

## 2024-09-28 RX ADMIN — OXYCODONE HYDROCHLORIDE 10 MG: 5 TABLET ORAL at 15:24

## 2024-09-28 RX ADMIN — GABAPENTIN 1200 MG: 400 CAPSULE ORAL at 19:40

## 2024-09-28 RX ADMIN — GABAPENTIN 1200 MG: 400 CAPSULE ORAL at 14:14

## 2024-09-28 RX ADMIN — HYDROMORPHONE HYDROCHLORIDE 0.5 MG: 1 INJECTION, SOLUTION INTRAMUSCULAR; INTRAVENOUS; SUBCUTANEOUS at 14:02

## 2024-09-28 ASSESSMENT — ACTIVITIES OF DAILY LIVING (ADL)
ADLS_ACUITY_SCORE: 20

## 2024-09-28 NOTE — PLAN OF CARE
"Goal Outcome Evaluation:      Plan of Care Reviewed With: patient    Overall Patient Progress: improvingOverall Patient Progress: improving    Outcome Evaluation: pt is NPO. A&OX4, on RA, hypertensive. rates his  pain 8/10. takes PRN Oxycodne, IV dilaudid. LR  infusing, IV CDI. ind. voiding.      Problem: Adult Inpatient Plan of Care  Goal: Plan of Care Review  Description: The Plan of Care Review/Shift note should be completed every shift.  The Outcome Evaluation is a brief statement about your assessment that the patient is improving, declining, or no change.  This information will be displayed automatically on your shift  note.  Outcome: Progressing  Flowsheets (Taken 9/27/2024 8738)  Outcome Evaluation: pt is NPO. A&OX4, on RA, hypertensive. rates his  pain 8/10. takes PRN Oxycodne, IV dilaudid. LR  infusing, IV CDI. ind. voiding.  Plan of Care Reviewed With: patient  Overall Patient Progress: improving  Goal: Patient-Specific Goal (Individualized)  Description: You can add care plan individualizations to a care plan. Examples of Individualization might be:  \"Parent requests to be called daily at 9am for status\", \"I have a hard time hearing out of my right ear\", or \"Do not touch me to wake me up as it startles  me\".  Outcome: Progressing  Goal: Absence of Hospital-Acquired Illness or Injury  Outcome: Progressing  Intervention: Identify and Manage Fall Risk  Recent Flowsheet Documentation  Taken 9/27/2024 2000 by Radha Tristan RN  Safety Promotion/Fall Prevention:   safety round/check completed   activity supervised  Intervention: Prevent and Manage VTE (Venous Thromboembolism) Risk  Recent Flowsheet Documentation  Taken 9/27/2024 2000 by Radha Tristan RN  VTE Prevention/Management: SCDs off (sequential compression devices)  Intervention: Prevent Infection  Recent Flowsheet Documentation  Taken 9/27/2024 2000 by Radha Tristan RN  Infection Prevention: single patient room provided  Goal: Optimal Comfort and " Wellbeing  Outcome: Progressing  Intervention: Monitor Pain and Promote Comfort  Recent Flowsheet Documentation  Taken 9/27/2024 2103 by Radha Tristan RN  Pain Management Interventions: medication (see MAR)  Taken 9/27/2024 1859 by Radha Tristan RN  Pain Management Interventions: medication (see MAR)  Taken 9/27/2024 1825 by Radha Tristan RN  Pain Management Interventions: medication (see MAR)  Goal: Readiness for Transition of Care  Outcome: Progressing     Problem: Pain Acute  Goal: Optimal Pain Control and Function  Outcome: Progressing  Intervention: Develop Pain Management Plan  Recent Flowsheet Documentation  Taken 9/27/2024 2103 by Radha Tristan RN  Pain Management Interventions: medication (see MAR)  Taken 9/27/2024 1859 by Radha Tristan RN  Pain Management Interventions: medication (see MAR)  Taken 9/27/2024 1825 by Radha Tristan RN  Pain Management Interventions: medication (see MAR)  Intervention: Prevent or Manage Pain  Recent Flowsheet Documentation  Taken 9/27/2024 2000 by Radha Tristan RN  Medication Review/Management: medications reviewed     Problem: Pancreatitis  Goal: Fluid and Electrolyte Balance  Outcome: Progressing  Goal: Absence of Infection Signs and Symptoms  Outcome: Progressing  Goal: Optimal Nutrition Delivery  Outcome: Progressing  Goal: Optimal Pain Control and Function  Outcome: Progressing  Intervention: Monitor and Manage Pain  Recent Flowsheet Documentation  Taken 9/27/2024 2103 by Radha Tristan RN  Pain Management Interventions: medication (see MAR)  Taken 9/27/2024 1859 by Radha Tristan RN  Pain Management Interventions: medication (see MAR)  Taken 9/27/2024 1825 by Radha Tristan RN  Pain Management Interventions: medication (see MAR)  Goal: Effective Oxygenation and Ventilation  Outcome: Progressing  Intervention: Optimize Oxygenation and Ventilation  Recent Flowsheet Documentation  Taken 9/27/2024 2000 by Radha Tristan RN  Cough And Deep Breathing: done  independently per patient  Activity Management: activity adjusted per tolerance  Taken 9/27/2024 1859 by Radha Tristan, RN  Activity Management: activity adjusted per tolerance

## 2024-09-28 NOTE — PLAN OF CARE
GI Consult.  Low Mg 1.5, replaced per protocol, monitor recheck at 2100.  Pain managed with both IV/PO pain meds, encouraged pt to wait for PRN PO pain meds if possible.  No nausea, tolerating clears.  LS clear bilat., RA.  Up ad ash., ambulating hallways frequently.  Voiding.    Goal Outcome Evaluation:    Plan of Care Reviewed With: patient, spouse    Overall Patient Progress: improvingOverall Patient Progress: improving    Outcome Evaluation: Tolerating clears.  GI consulted.    Problem: Adult Inpatient Plan of Care  Goal: Plan of Care Review  Description: The Plan of Care Review/Shift note should be completed every shift.  The Outcome Evaluation is a brief statement about your assessment that the patient is improving, declining, or no change.  This information will be displayed automatically on your shift  note.  Recent Flowsheet Documentation  Taken 9/28/2024 1839 by Nancy Greenwood RN  Outcome Evaluation: Tolerating clears.  GI consulted.  Plan of Care Reviewed With:   patient   spouse  Overall Patient Progress: improving  Goal: Absence of Hospital-Acquired Illness or Injury  Intervention: Identify and Manage Fall Risk  Recent Flowsheet Documentation  Taken 9/28/2024 0840 by Nancy Greenwood RN  Safety Promotion/Fall Prevention:   room organization consistent   clutter free environment maintained   lighting adjusted  Intervention: Prevent Skin Injury  Recent Flowsheet Documentation  Taken 9/28/2024 0840 by Nancy Greenwood RN  Body Position: position changed independently  Intervention: Prevent and Manage VTE (Venous Thromboembolism) Risk  Recent Flowsheet Documentation  Taken 9/28/2024 0840 by Nancy Greenwood RN  VTE Prevention/Management:   patient refused intervention   SCDs off (sequential compression devices)  Intervention: Prevent Infection  Recent Flowsheet Documentation  Taken 9/28/2024 0840 by Nancy Greenwood RN  Infection Prevention:   hand hygiene promoted   equipment surfaces disinfected  Goal: Optimal  Comfort and Wellbeing  Intervention: Monitor Pain and Promote Comfort  Recent Flowsheet Documentation  Taken 9/28/2024 1745 by Nancy Greenwood RN  Pain Management Interventions: ambulation/increased activity  Taken 9/28/2024 1524 by Nancy Greenwood RN  Pain Management Interventions: rest  Taken 9/28/2024 1435 by Nancy Greenwood RN  Pain Management Interventions: ambulation/increased activity  Taken 9/28/2024 1414 by Nancy Greenwood RN  Pain Management Interventions: medication (see MAR)  Taken 9/28/2024 1135 by Nancy Greenwood RN  Pain Management Interventions: rest  Taken 9/28/2024 1054 by Nancy Greenwood RN  Pain Management Interventions: medication (see MAR)  Taken 9/28/2024 1015 by Nancy Greenwood RN  Pain Management Interventions: rest  Taken 9/28/2024 0920 by Nancy Greenwood RN  Pain Management Interventions: medication (see MAR)  Taken 9/28/2024 0844 by Nancy Greenwood RN  Pain Management Interventions: medication (see MAR)  Taken 9/28/2024 0840 by Nancy Greenwood RN  Pain Management Interventions: pain management plan reviewed with patient/caregiver     Problem: Pain Acute  Goal: Optimal Pain Control and Function  Intervention: Develop Pain Management Plan  Recent Flowsheet Documentation  Taken 9/28/2024 1745 by Nancy Greenwood RN  Pain Management Interventions: ambulation/increased activity  Taken 9/28/2024 1524 by Nancy Greenwood RN  Pain Management Interventions: rest  Taken 9/28/2024 1435 by Nancy Greenwood RN  Pain Management Interventions: ambulation/increased activity  Taken 9/28/2024 1414 by Nancy Greenwood RN  Pain Management Interventions: medication (see MAR)  Taken 9/28/2024 1135 by Nancy Greenwood RN  Pain Management Interventions: rest  Taken 9/28/2024 1054 by Nancy Greenwood RN  Pain Management Interventions: medication (see MAR)  Taken 9/28/2024 1015 by Vu, Onelia-Edwige H, RN  Pain Management Interventions: rest  Taken 9/28/2024 0920 by Nancy Greenwood RN  Pain Management Interventions: medication (see MAR)  Taken  9/28/2024 0844 by Nancy Greenwood RN  Pain Management Interventions: medication (see MAR)  Taken 9/28/2024 0840 by Nancy Greenwood RN  Pain Management Interventions: pain management plan reviewed with patient/caregiver  Intervention: Prevent or Manage Pain  Recent Flowsheet Documentation  Taken 9/28/2024 0840 by Nancy Greenwood RN  Bowel Elimination Promotion:   privacy promoted   adequate fluid intake promoted   ambulation promoted   diet adjusted  Medication Review/Management: medications reviewed  Intervention: Optimize Psychosocial Wellbeing  Recent Flowsheet Documentation  Taken 9/28/2024 0840 by Nancy Greenwood RN  Supportive Measures:   active listening utilized   self-care encouraged     Problem: Adult Inpatient Plan of Care  Goal: Plan of Care Review  Description: The Plan of Care Review/Shift note should be completed every shift.  The Outcome Evaluation is a brief statement about your assessment that the patient is improving, declining, or no change.  This information will be displayed automatically on your shift  note.  Flowsheets (Taken 9/28/2024 1839)  Outcome Evaluation: Tolerating clears.  GI consulted.  Plan of Care Reviewed With:   patient   spouse  Overall Patient Progress: improving  Goal: Absence of Hospital-Acquired Illness or Injury  Intervention: Identify and Manage Fall Risk  Recent Flowsheet Documentation  Taken 9/28/2024 0840 by Nancy Greenwood RN  Safety Promotion/Fall Prevention:   room organization consistent   clutter free environment maintained   lighting adjusted  Intervention: Prevent Skin Injury  Recent Flowsheet Documentation  Taken 9/28/2024 0840 by Nancy Greenwood RN  Body Position: position changed independently  Intervention: Prevent and Manage VTE (Venous Thromboembolism) Risk  Recent Flowsheet Documentation  Taken 9/28/2024 0840 by Nancy Greenwood RN  VTE Prevention/Management:   patient refused intervention   SCDs off (sequential compression devices)  Intervention: Prevent  Infection  Recent Flowsheet Documentation  Taken 9/28/2024 0840 by Nancy Greenwood RN  Infection Prevention:   hand hygiene promoted   equipment surfaces disinfected  Goal: Optimal Comfort and Wellbeing  Intervention: Monitor Pain and Promote Comfort  Recent Flowsheet Documentation  Taken 9/28/2024 1745 by Nancy Greenwood RN  Pain Management Interventions: ambulation/increased activity  Taken 9/28/2024 1524 by Nancy Greenwood RN  Pain Management Interventions: rest  Taken 9/28/2024 1435 by Nancy Greenwood RN  Pain Management Interventions: ambulation/increased activity  Taken 9/28/2024 1414 by Nancy Greenwood RN  Pain Management Interventions: medication (see MAR)  Taken 9/28/2024 1135 by Nancy Greenwood RN  Pain Management Interventions: rest  Taken 9/28/2024 1054 by Nancy Greenwood RN  Pain Management Interventions: medication (see MAR)  Taken 9/28/2024 1015 by Nancy Greenwood RN  Pain Management Interventions: rest  Taken 9/28/2024 0920 by Nancy Greenwood RN  Pain Management Interventions: medication (see MAR)  Taken 9/28/2024 0844 by Nancy Greenwood RN  Pain Management Interventions: medication (see MAR)  Taken 9/28/2024 0840 by Nancy Greenwood RN  Pain Management Interventions: pain management plan reviewed with patient/caregiver     Problem: Pain Acute  Goal: Optimal Pain Control and Function  Intervention: Develop Pain Management Plan  Recent Flowsheet Documentation  Taken 9/28/2024 1745 by Nancy Greenwood RN  Pain Management Interventions: ambulation/increased activity  Taken 9/28/2024 1524 by Nancy Greenwood RN  Pain Management Interventions: rest  Taken 9/28/2024 1435 by Nancy Greenwood RN  Pain Management Interventions: ambulation/increased activity  Taken 9/28/2024 1414 by Nancy Greenwood RN  Pain Management Interventions: medication (see MAR)  Taken 9/28/2024 1135 by Nancy Greenwood RN  Pain Management Interventions: rest  Taken 9/28/2024 1054 by Nancy Greenwood RN  Pain Management Interventions: medication (see  MAR)  Taken 9/28/2024 1015 by Nancy Greenwood RN  Pain Management Interventions: rest  Taken 9/28/2024 0920 by Nancy Greenwood RN  Pain Management Interventions: medication (see MAR)  Taken 9/28/2024 0844 by Nancy Greenwood RN  Pain Management Interventions: medication (see MAR)  Taken 9/28/2024 0840 by Nancy Greenwood RN  Pain Management Interventions: pain management plan reviewed with patient/caregiver  Intervention: Prevent or Manage Pain  Recent Flowsheet Documentation  Taken 9/28/2024 0840 by Nancy Greenwood RN  Bowel Elimination Promotion:   privacy promoted   adequate fluid intake promoted   ambulation promoted   diet adjusted  Medication Review/Management: medications reviewed  Intervention: Optimize Psychosocial Wellbeing  Recent Flowsheet Documentation  Taken 9/28/2024 0840 by Nancy Greenwood RN  Supportive Measures:   active listening utilized   self-care encouraged     Problem: Pancreatitis  Goal: Fluid and Electrolyte Balance  Intervention: Monitor and Manage Fluid and Electrolyte Balance  Recent Flowsheet Documentation  Taken 9/28/2024 0840 by Nancy Greenwood RN  Fluid/Electrolyte Management: fluids provided  Goal: Optimal Pain Control and Function  Intervention: Monitor and Manage Pain  Recent Flowsheet Documentation  Taken 9/28/2024 1745 by Nancy Greenwood RN  Pain Management Interventions: ambulation/increased activity  Taken 9/28/2024 1524 by Nancy Greenwood RN  Pain Management Interventions: rest  Taken 9/28/2024 1435 by Nancy Greenwood RN  Pain Management Interventions: ambulation/increased activity  Taken 9/28/2024 1414 by Nancy Greenwood RN  Pain Management Interventions: medication (see MAR)  Taken 9/28/2024 1135 by Nancy Greenwood RN  Pain Management Interventions: rest  Taken 9/28/2024 1054 by Nancy Greenwood RN  Pain Management Interventions: medication (see MAR)  Taken 9/28/2024 1015 by Nancy Greenwood RN  Pain Management Interventions: rest  Taken 9/28/2024 0920 by Nancy Greenwood RN  Pain Management  Interventions: medication (see MAR)  Taken 9/28/2024 0844 by Nancy Greenwood RN  Pain Management Interventions: medication (see MAR)  Taken 9/28/2024 0840 by Nancy Greenwood RN  Pain Management Interventions: pain management plan reviewed with patient/caregiver  Goal: Effective Oxygenation and Ventilation  Intervention: Optimize Oxygenation and Ventilation  Recent Flowsheet Documentation  Taken 9/28/2024 1745 by Nancy Greenwood, JOEL  Activity Management:   ambulated outside room   up ad ash  Taken 9/28/2024 1524 by Nancy Greenwood RN  Activity Management:   ambulated outside room   up ad ash  Taken 9/28/2024 1435 by Nancy Greenwood RN  Activity Management:   ambulated outside room   up ad ash  Taken 9/28/2024 1414 by Nancy Greenwood RN  Activity Management:   ambulated in room   up in chair   up ad ash  Taken 9/28/2024 1235 by Nancy Greenwood, JOEL  Activity Management:   ambulated outside room   up ad ash  Taken 9/28/2024 1135 by Nancy Greenwood RN  Activity Management: up in chair  Taken 9/28/2024 1054 by Nancy Greenwood, JOEL  Activity Management:   ambulated in room   ambulated to bathroom   up ad ash  Taken 9/28/2024 1015 by Nancy Greenwood, JOEL  Activity Management:   ambulated outside room   up ad ash  Taken 9/28/2024 0950 by Nancy Greenwood, JOEL  Activity Management:   ambulated outside room   up ad ash  Taken 9/28/2024 0920 by Nancy Greenwood, JOEL  Activity Management:   up in chair   up ad ash   activity encouraged  Taken 9/28/2024 0844 by Nancy Greenwood, RN  Activity Management:   sitting, edge of bed   up ad ash  Taken 9/28/2024 0840 by Nancy Greenwood RN  Cough And Deep Breathing: done independently per patient  Airway/Ventilation Management:   pulmonary hygiene promoted   calming measures promoted  Activity Management:   ambulated in room   back to bed   sitting, edge of bed

## 2024-09-28 NOTE — PLAN OF CARE
"Orientation: AAOx4, VSS  Pain: pain is managed with PRN pain medications  O2: RA  GI/: Ambulates to restroom  Activity: Independent  Diet: NPO  Protocols: K,  Major shift events:  Plan: discharge TBD  Problem: Adult Inpatient Plan of Care  Goal: Plan of Care Review  Description: The Plan of Care Review/Shift note should be completed every shift.  The Outcome Evaluation is a brief statement about your assessment that the patient is improving, declining, or no change.  This information will be displayed automatically on your shift  note.  Outcome: Progressing  Flowsheets (Taken 9/28/2024 0021)  Plan of Care Reviewed With: patient  Overall Patient Progress: improving  Goal: Patient-Specific Goal (Individualized)  Description: You can add care plan individualizations to a care plan. Examples of Individualization might be:  \"Parent requests to be called daily at 9am for status\", \"I have a hard time hearing out of my right ear\", or \"Do not touch me to wake me up as it startles  me\".  Outcome: Progressing  Goal: Absence of Hospital-Acquired Illness or Injury  Outcome: Progressing  Goal: Optimal Comfort and Wellbeing  Outcome: Progressing  Goal: Readiness for Transition of Care  Outcome: Progressing     Problem: Pain Acute  Goal: Optimal Pain Control and Function  Outcome: Progressing     Problem: Pancreatitis  Goal: Fluid and Electrolyte Balance  Outcome: Progressing  Goal: Absence of Infection Signs and Symptoms  Outcome: Progressing  Goal: Optimal Nutrition Delivery  Outcome: Progressing  Goal: Optimal Pain Control and Function  Outcome: Progressing  Goal: Effective Oxygenation and Ventilation  Outcome: Progressing   Goal Outcome Evaluation:      Plan of Care Reviewed With: patient    Overall Patient Progress: improvingOverall Patient Progress: improving           "

## 2024-09-28 NOTE — PROGRESS NOTES
[facility]    Hospitalist Progress Note      Assessment & Plan   Ozzie Olmedo is a 43 year old male patient with past medical history of diabetes mellitus type 2, hypertension, hyperlipidemia, history of pancreatitis, came to emergency room with a complaint of abdominal pain and vomiting.       #Recurrent alcohol-induced pancreatitis: He has prior history of pancreatitis in 2020 requiring hospitalization.  He states that he was drinking alcohol and his last alcohol intake was on Monday.  On arrival to emergency room, his initial vital signs showed temperature 98.6, pulse 85, blood pressure 166/105, oxygen saturation 98% on room air.  Laboratory workup showed normal BMP and LFTs.  Lipase 105.  CBC unremarkable.  CT of the abdomen/pelvis showed acute interstitial edematous focal pancreatitis without fluid collections.  -ER, he was given IV fluid bolus, IV Dilaudid.  He was admitted to the hospital for further management.  -Pt with significant pain on 09/26.  He was trialed on morphine instead of dilaudid without improvement.  Increased dose of dilaudid with added atarax as adjuvant without significant improvement.  He was placed on dilaudid PCA and thinks he feels a bit better.  Still some abdominal discomfort in upper abdomen but improved. He is not tachycardic and HDS.  -Lipase rechecked and stable.  Not significantly elevated.  Weaned off PCA and back to prn dilaudid on 09/27.   -Pt still endorsing epigastric abdominal pain on 09/28.  Only required IV dilaudid twice last night and did sleep.  He still has tenderness to palpation in epigastric region.  Given his lack of clinical improvement for 3 days, will consult GI to evaluate.  Question re-scan?  -Continue prn 0.5 mg dilaudid every 2 hours prn for severe pain, 0.3 mg every 2 hours for moderate pain.  Oral oxycodone also available.  He is continuing on his home high dose gabepentin. Add prn atarax as adjuvant.    -Continue IVF.  Switched  to LR  -He has clear liquid diet ordered but he is hesitant given ongoing pain.       #Chronic shoulder pain: Pt notes he has chronic pain that is managed by PCP. In review of PDMP, he has been getting 30 pills of 5 mg oxycodone the last 2 months from his PCP.  He is on chronic gabapentin 1200 mg TID.      #Diabetes mellitus type 2: On metformin.  BS have not been elevated here.  Will start sliding scale insulin with starting CLD.   #Hypertension: Will resume losartan  #Hyperlipidemia: Will resume rosuvastatin  #GERD, gastric ulcer: PPI  #Obesity: Complicates cares     DVT Prophylaxis: Pneumatic Compression Devices  Code Status: Full Code  Medically Ready for Discharge: Anticipated in 2-4 Days    Terry Hall MD    Interval History   Still ongoing epigastric abdominal pain. Uses IV dilaudid twice last night.  No CP, SOB. No fevers/chills. BP's. Up.     -Data reviewed today: I reviewed all new labs and imaging results over the last 24 hours. I personally reviewed     Physical Exam   Temp: 97.8  F (36.6  C) Temp src: Temporal BP: (!) 190/103 (gonna re try in a few minutes) Pulse: 63   Resp: 18 SpO2: 99 % O2 Device: None (Room air)    Vitals:    09/25/24 1534   Weight: 117.1 kg (258 lb 2.5 oz)     Vital Signs with Ranges  Temp:  [96.7  F (35.9  C)-97.8  F (36.6  C)] 97.8  F (36.6  C)  Pulse:  [63-88] 63  Resp:  [18-20] 18  BP: (173-190)/() 190/103  SpO2:  [95 %-99 %] 99 %  I/O last 3 completed shifts:  In: 137 [P.O.:120; I.V.:17]  Out: -     Constitutional: Nontoxic, NAD. Sitting up in chair.   HEENT: Normocephalic. MMM, No elevation of JVD noted.   Respiratory: Nl WOB, Clear bilaterally, No wheezes or crackles  Cardiovascular: Regular, no murmur  GI: Soft. Obese. Continued tenderness to palpation in epigastric region without rebound. Mild distension.   Skin/Integumen: WWP, no rash. No edema  Neuro: CNII-XII intact. Moves all extremities. No tremor. A&Ox3.    Medications   Current Facility-Administered Medications    Medication Dose Route Frequency Provider Last Rate Last Admin    lactated ringers infusion   Intravenous Continuous Terry Hall  mL/hr at 09/28/24 0441 New Bag at 09/28/24 0441     Current Facility-Administered Medications   Medication Dose Route Frequency Provider Last Rate Last Admin    celecoxib (celeBREX) capsule 100 mg  100 mg Oral BID Terry Hall MD   100 mg at 09/27/24 2017    gabapentin (NEURONTIN) capsule 1,200 mg  1,200 mg Oral TID Nila Michelle MD   1,200 mg at 09/27/24 2017    losartan (COZAAR) tablet 50 mg  50 mg Oral Daily Nila Michelle MD   50 mg at 09/27/24 0802    pantoprazole (PROTONIX) IV push injection 40 mg  40 mg Intravenous Daily with breakfast Nila Michelle MD   40 mg at 09/27/24 0802    polyethylene glycol (MIRALAX) Packet 17 g  17 g Oral Daily Terry Hall MD   17 g at 09/26/24 1659    rosuvastatin (CRESTOR) tablet 40 mg  40 mg Oral Daily Nila Michelle MD   40 mg at 09/27/24 2017    senna-docusate (SENOKOT-S/PERICOLACE) 8.6-50 MG per tablet 1 tablet  1 tablet Oral BID Terry Hall MD        Or    senna-docusate (SENOKOT-S/PERICOLACE) 8.6-50 MG per tablet 2 tablet  2 tablet Oral BID Terry Hall MD        sodium chloride (PF) 0.9% PF flush 3 mL  3 mL Intracatheter Q8H Nila Michelle MD   3 mL at 09/26/24 0509    tiZANidine (ZANAFLEX) tablet 4 mg  4 mg Oral At Bedtime Terry Hall MD   4 mg at 09/27/24 2244       Data   Recent Labs   Lab 09/28/24  0631 09/27/24  0612 09/26/24  2201 09/26/24  1704 09/26/24  0956 09/26/24  0605 09/25/24  2132 09/25/24  1549   WBC  --   --   --   --   --  9.8  --  7.9   HGB  --   --   --   --   --  14.3  --  15.2   MCV  --   --   --   --   --  94  --  93   PLT  --   --   --   --   --  220  --  253   NA  --  135  --   --   --  138  --  137   POTASSIUM 3.9 3.9  --   --   --  4.0  --  3.8   CHLORIDE  --  98  --   --   --  101  --  98   CO2  --  22  --   --   --  23  --  23   BUN  --  9.4  --   --   --   10.9  --  11.0   CR  --  0.85  --   --   --  0.93  --  0.94   ANIONGAP  --  15  --   --   --  14  --  16*   SYLVESTER  --  8.8  --   --   --  8.3*  --  8.9   GLC  --  108* 109* 112*   < > 132*   < > 138*   ALBUMIN  --   --   --   --   --  4.1  --  4.4   PROTTOTAL  --   --   --   --   --  6.2*  --  6.8   BILITOTAL  --   --   --   --   --  0.6  --  0.8   ALKPHOS  --   --   --   --   --  87  --  98   ALT  --   --   --   --   --  32  --  40   AST  --   --   --   --   --  23  --  26   LIPASE  --  125*  --   --   --  109*  --  105*    < > = values in this interval not displayed.       No results found for this or any previous visit (from the past 24 hour(s)).

## 2024-09-29 LAB
GLUCOSE BLDC GLUCOMTR-MCNC: 106 MG/DL (ref 70–99)
GLUCOSE BLDC GLUCOMTR-MCNC: 114 MG/DL (ref 70–99)
GLUCOSE BLDC GLUCOMTR-MCNC: 120 MG/DL (ref 70–99)
GLUCOSE BLDC GLUCOMTR-MCNC: 164 MG/DL (ref 70–99)
MAGNESIUM SERPL-MCNC: 2.3 MG/DL (ref 1.7–2.3)
POTASSIUM SERPL-SCNC: 4.6 MMOL/L (ref 3.4–5.3)

## 2024-09-29 PROCEDURE — 250N000009 HC RX 250: Performed by: INTERNAL MEDICINE

## 2024-09-29 PROCEDURE — 250N000013 HC RX MED GY IP 250 OP 250 PS 637: Performed by: HOSPITALIST

## 2024-09-29 PROCEDURE — 250N000013 HC RX MED GY IP 250 OP 250 PS 637: Performed by: INTERNAL MEDICINE

## 2024-09-29 PROCEDURE — 36415 COLL VENOUS BLD VENIPUNCTURE: CPT | Performed by: HOSPITALIST

## 2024-09-29 PROCEDURE — 84132 ASSAY OF SERUM POTASSIUM: CPT | Performed by: HOSPITALIST

## 2024-09-29 PROCEDURE — 99232 SBSQ HOSP IP/OBS MODERATE 35: CPT | Performed by: INTERNAL MEDICINE

## 2024-09-29 PROCEDURE — 250N000011 HC RX IP 250 OP 636: Performed by: HOSPITALIST

## 2024-09-29 PROCEDURE — 120N000001 HC R&B MED SURG/OB

## 2024-09-29 PROCEDURE — 83735 ASSAY OF MAGNESIUM: CPT | Performed by: HOSPITALIST

## 2024-09-29 RX ORDER — TRIAMCINOLONE ACETONIDE 1 MG/G
CREAM TOPICAL 2 TIMES DAILY
Status: DISCONTINUED | OUTPATIENT
Start: 2024-09-29 | End: 2024-10-01 | Stop reason: HOSPADM

## 2024-09-29 RX ORDER — PANTOPRAZOLE SODIUM 40 MG/1
40 TABLET, DELAYED RELEASE ORAL
Status: DISCONTINUED | OUTPATIENT
Start: 2024-09-30 | End: 2024-10-01 | Stop reason: HOSPADM

## 2024-09-29 RX ORDER — LANOLIN ALCOHOL/MO/W.PET/CERES
CREAM (GRAM) TOPICAL
Status: DISCONTINUED | OUTPATIENT
Start: 2024-09-29 | End: 2024-10-01 | Stop reason: HOSPADM

## 2024-09-29 RX ORDER — DIPHENHYDRAMINE HCL 25 MG
25 CAPSULE ORAL EVERY 6 HOURS PRN
Status: DISCONTINUED | OUTPATIENT
Start: 2024-09-29 | End: 2024-09-30

## 2024-09-29 RX ADMIN — LOSARTAN POTASSIUM 50 MG: 50 TABLET, FILM COATED ORAL at 08:50

## 2024-09-29 RX ADMIN — NICOTINE POLACRILEX 2 MG: 2 GUM, CHEWING BUCCAL at 08:50

## 2024-09-29 RX ADMIN — OXYCODONE HYDROCHLORIDE 10 MG: 5 TABLET ORAL at 10:56

## 2024-09-29 RX ADMIN — NICOTINE POLACRILEX 2 MG: 2 GUM, CHEWING BUCCAL at 13:37

## 2024-09-29 RX ADMIN — HYDROMORPHONE HYDROCHLORIDE 0.5 MG: 1 INJECTION, SOLUTION INTRAMUSCULAR; INTRAVENOUS; SUBCUTANEOUS at 17:23

## 2024-09-29 RX ADMIN — OXYCODONE HYDROCHLORIDE 10 MG: 5 TABLET ORAL at 20:31

## 2024-09-29 RX ADMIN — HYDROMORPHONE HYDROCHLORIDE 0.5 MG: 1 INJECTION, SOLUTION INTRAMUSCULAR; INTRAVENOUS; SUBCUTANEOUS at 13:35

## 2024-09-29 RX ADMIN — HYDROMORPHONE HYDROCHLORIDE 0.5 MG: 1 INJECTION, SOLUTION INTRAMUSCULAR; INTRAVENOUS; SUBCUTANEOUS at 08:50

## 2024-09-29 RX ADMIN — NICOTINE POLACRILEX 2 MG: 2 GUM, CHEWING BUCCAL at 10:56

## 2024-09-29 RX ADMIN — NICOTINE POLACRILEX 2 MG: 2 GUM, CHEWING BUCCAL at 14:19

## 2024-09-29 RX ADMIN — DIPHENHYDRAMINE HYDROCHLORIDE 25 MG: 25 CAPSULE ORAL at 08:50

## 2024-09-29 RX ADMIN — CELECOXIB 100 MG: 100 CAPSULE ORAL at 20:31

## 2024-09-29 RX ADMIN — GABAPENTIN 1200 MG: 400 CAPSULE ORAL at 14:19

## 2024-09-29 RX ADMIN — GABAPENTIN 1200 MG: 400 CAPSULE ORAL at 08:50

## 2024-09-29 RX ADMIN — NICOTINE POLACRILEX 2 MG: 2 GUM, CHEWING BUCCAL at 04:42

## 2024-09-29 RX ADMIN — DIPHENHYDRAMINE HYDROCHLORIDE 25 MG: 25 CAPSULE ORAL at 14:19

## 2024-09-29 RX ADMIN — OXYCODONE HYDROCHLORIDE 10 MG: 5 TABLET ORAL at 06:21

## 2024-09-29 RX ADMIN — GABAPENTIN 1200 MG: 400 CAPSULE ORAL at 20:31

## 2024-09-29 RX ADMIN — TRIAMCINOLONE ACETONIDE: 1 CREAM TOPICAL at 20:35

## 2024-09-29 RX ADMIN — NICOTINE POLACRILEX 2 MG: 2 GUM, CHEWING BUCCAL at 06:03

## 2024-09-29 RX ADMIN — NICOTINE POLACRILEX 2 MG: 2 GUM, CHEWING BUCCAL at 07:02

## 2024-09-29 RX ADMIN — Medication: at 17:24

## 2024-09-29 RX ADMIN — TRIAMCINOLONE ACETONIDE: 1 CREAM TOPICAL at 08:43

## 2024-09-29 RX ADMIN — OXYCODONE HYDROCHLORIDE 10 MG: 5 TABLET ORAL at 15:09

## 2024-09-29 RX ADMIN — ROSUVASTATIN CALCIUM 40 MG: 20 TABLET, FILM COATED ORAL at 20:31

## 2024-09-29 RX ADMIN — CYCLOBENZAPRINE 10 MG: 10 TABLET, FILM COATED ORAL at 10:56

## 2024-09-29 RX ADMIN — NICOTINE POLACRILEX 2 MG: 2 GUM, CHEWING BUCCAL at 20:37

## 2024-09-29 RX ADMIN — NICOTINE POLACRILEX 2 MG: 2 GUM, CHEWING BUCCAL at 17:24

## 2024-09-29 RX ADMIN — HYDROMORPHONE HYDROCHLORIDE 0.5 MG: 1 INJECTION, SOLUTION INTRAMUSCULAR; INTRAVENOUS; SUBCUTANEOUS at 22:43

## 2024-09-29 RX ADMIN — PANTOPRAZOLE SODIUM 40 MG: 40 INJECTION, POWDER, FOR SOLUTION INTRAVENOUS at 08:50

## 2024-09-29 RX ADMIN — TRIAMCINOLONE ACETONIDE: 1 CREAM TOPICAL at 02:07

## 2024-09-29 RX ADMIN — CELECOXIB 100 MG: 100 CAPSULE ORAL at 08:50

## 2024-09-29 RX ADMIN — TIZANIDINE 4 MG: 4 TABLET ORAL at 22:39

## 2024-09-29 RX ADMIN — DIPHENHYDRAMINE HYDROCHLORIDE 25 MG: 25 CAPSULE ORAL at 20:31

## 2024-09-29 RX ADMIN — DIPHENHYDRAMINE HYDROCHLORIDE 25 MG: 25 CAPSULE ORAL at 01:57

## 2024-09-29 RX ADMIN — NICOTINE POLACRILEX 2 MG: 2 GUM, CHEWING BUCCAL at 01:57

## 2024-09-29 ASSESSMENT — ACTIVITIES OF DAILY LIVING (ADL)
ADLS_ACUITY_SCORE: 20
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
## 2024-09-29 NOTE — PLAN OF CARE
Goal Outcome Evaluation:      Plan of Care Reviewed With: patient    Overall Patient Progress: improvingOverall Patient Progress: improving     [id number]  Calm and cooperative  Pain controlled with PRN oxycodone 2355, 0621  PRN benadryl for itching at 0157,   Kenalog cream for feet         Problem: Adult Inpatient Plan of Care  Goal: Plan of Care Review  Description: The Plan of Care Review/Shift note should be completed every shift.  The Outcome Evaluation is a brief statement about your assessment that the patient is improving, declining, or no change.  This information will be displayed automatically on your shift  note.  Recent Flowsheet Documentation  Taken 9/29/2024 0602 by Aravind Barbosa RN  Plan of Care Reviewed With: patient  Overall Patient Progress: improving  Goal: Absence of Hospital-Acquired Illness or Injury  Intervention: Identify and Manage Fall Risk  Recent Flowsheet Documentation  Taken 9/29/2024 0300 by Aravind Barbosa RN  Safety Promotion/Fall Prevention: safety round/check completed  Intervention: Prevent Skin Injury  Recent Flowsheet Documentation  Taken 9/29/2024 0300 by Aravind Barbosa RN  Body Position: position changed independently  Intervention: Prevent and Manage VTE (Venous Thromboembolism) Risk  Recent Flowsheet Documentation  Taken 9/29/2024 0300 by Aravind Barbosa RN  VTE Prevention/Management:   patient refused intervention   SCDs off (sequential compression devices)  Intervention: Prevent Infection  Recent Flowsheet Documentation  Taken 9/29/2024 0300 by Aravind Barbosa RN  Infection Prevention: single patient room provided  Goal: Optimal Comfort and Wellbeing  Intervention: Monitor Pain and Promote Comfort  Recent Flowsheet Documentation  Taken 9/28/2024 2355 by Aravind Barbosa RN  Pain Management Interventions: medication (see MAR)

## 2024-09-29 NOTE — PLAN OF CARE
"A&Ox4. VSS. Pain controlled with PO pain meds. Denies n/t. Denies nausea. Ambulating in hallway independently. Voiding appropriately. Tolerating clear liquid diet. Blood sugar was 139 this shift. LR running at 125. Mg recheck was 2.0. New +1 edema and redness to bilateral lower feet. Will continue to monitor.       Goal Outcome Evaluation:      Plan of Care Reviewed With: patient    Overall Patient Progress: improvingOverall Patient Progress: improving       Problem: Adult Inpatient Plan of Care  Goal: Plan of Care Review  Description: The Plan of Care Review/Shift note should be completed every shift.  The Outcome Evaluation is a brief statement about your assessment that the patient is improving, declining, or no change.  This information will be displayed automatically on your shift  note.  Outcome: Progressing  Flowsheets (Taken 9/28/2024 2239)  Plan of Care Reviewed With: patient  Overall Patient Progress: improving  Goal: Patient-Specific Goal (Individualized)  Description: You can add care plan individualizations to a care plan. Examples of Individualization might be:  \"Parent requests to be called daily at 9am for status\", \"I have a hard time hearing out of my right ear\", or \"Do not touch me to wake me up as it startles  me\".  Outcome: Progressing  Goal: Absence of Hospital-Acquired Illness or Injury  Outcome: Progressing  Intervention: Identify and Manage Fall Risk  Recent Flowsheet Documentation  Taken 9/28/2024 2232 by Nichol Calixto, RN  Safety Promotion/Fall Prevention: safety round/check completed  Goal: Optimal Comfort and Wellbeing  Outcome: Progressing  Goal: Readiness for Transition of Care  Outcome: Progressing     Problem: Pain Acute  Goal: Optimal Pain Control and Function  Outcome: Progressing  Intervention: Prevent or Manage Pain  Recent Flowsheet Documentation  Taken 9/28/2024 2232 by Nichol Calixto, RN  Medication Review/Management: medications reviewed     Problem: Pancreatitis  Goal: " Fluid and Electrolyte Balance  Outcome: Progressing  Goal: Absence of Infection Signs and Symptoms  Outcome: Progressing  Goal: Optimal Nutrition Delivery  Outcome: Progressing  Goal: Optimal Pain Control and Function  Outcome: Progressing  Goal: Effective Oxygenation and Ventilation  Outcome: Progressing

## 2024-09-29 NOTE — PROGRESS NOTES
Cross Cover    Called for burning/itching rash on bilateral feet and lower anterior shin .  Did use some lotion on both of his feet and also states that he had a new pair of booties that he was wearing.  Sensitive skin to soaps and stuff    I went to see him, nad  Bilateral feet with splotchy rash, some welting/swelling.     ? Contact dermatitis from booties, seems less likely related to the lotion as he used it on his hands and didn't have the same reaction     Trial po diphenhydramine and topical TMC 0.1% cream

## 2024-09-29 NOTE — PROGRESS NOTES
[facility]    Medicine Progress Note - Hospitalist Service    Date of Admission:  9/25/2024    Assessment & Plan     Ozzie Olmedo is a 43 year old male patient with past medical history of diabetes mellitus type 2, hypertension, hyperlipidemia, history of pancreatitis, came to emergency room with a complaint of abdominal pain and vomiting.     Acute pancreatitis.  -Suspect due to alcohol use.  -Needs monitor alcohol cessation.  -Symptoms continue improving.  -Advance to full liquid diet.  -Appreciate gastroenterology input.    Chronic pain syndrome.  -Continue scheduled gabapentin.  -Additional pain medications as needed.    Hypertension.  -Continue losartan 50 mg a day.    Hyperlipidemia.  -Continue rosuvastatin 40 mg a day.    GERD.  -Change pantoprazole to oral.    Hypomagnesemia.  -Magnesium replacement protocol.    Diabetes mellitus type 2.  -Continue aspart insulin sliding scale as needed.            Diet: Full Liquid Diet    DVT Prophylaxis: Pneumatic Compression Devices  Castro Catheter: Not present  Lines: None     Cardiac Monitoring: None  Code Status: Full Code      Clinically Significant Risk Factors            # Hypomagnesemia: Lowest Mg = 1.5 mg/dL in last 2 days, will replace as needed                 # Obesity: Estimated body mass index is 35.01 kg/m  as calculated from the following:    Height as of this encounter: 1.829 m (6').    Weight as of this encounter: 117.1 kg (258 lb 2.5 oz)., PRESENT ON ADMISSION            Disposition Plan     Medically Ready for Discharge: Anticipated Tomorrow             Marcin Murillo DO  Hospitalist Service  [facility]  Securely message with [email or web address] (more info)  Text page via [email or web address] Paging/Directory   ______________________________________________________________________    Interval History   Abdominal pain improving.  No nausea today.  Denies chest pain, shortness of breath, fevers, chills.    Physical Exam   Vital  Signs: Temp: 99.4  F (37.4  C) Temp src: Temporal BP: (!) 147/89 Pulse: 85   Resp: 20 SpO2: 97 % O2 Device: None (Room air)    Weight: 258 lbs 2.54 oz    Gen:  NAD, A&Ox3.  Eyes:  PERRL, sclera anicteric.  OP:  MMM, no lesions.  Neck:  Supple.  CV:  Regular, no murmurs.  Lung:  CTA b/l, normal effort.  Ab:  +BS, soft.  Skin:  Warm, dry to touch.  No rash.  Ext:  No pitting edema LE b/l.      Medical Decision Making       37 MINUTES SPENT BY ME on the date of service doing chart review, history, exam, documentation & further activities per the note.      Data     I have personally reviewed the following data over the past 24 hrs:    N/A  \   N/A   / N/A     N/A N/A N/A /  114 (H)   4.6 N/A N/A \       Imaging results reviewed over the past 24 hrs:   No results found for this or any previous visit (from the past 24 hour(s)).

## 2024-09-30 LAB
GLUCOSE BLDC GLUCOMTR-MCNC: 115 MG/DL (ref 70–99)
GLUCOSE BLDC GLUCOMTR-MCNC: 132 MG/DL (ref 70–99)
GLUCOSE BLDC GLUCOMTR-MCNC: 149 MG/DL (ref 70–99)
GLUCOSE BLDC GLUCOMTR-MCNC: 158 MG/DL (ref 70–99)
GLUCOSE BLDC GLUCOMTR-MCNC: 171 MG/DL (ref 70–99)
HOLD SPECIMEN: NORMAL
MAGNESIUM SERPL-MCNC: 1.6 MG/DL (ref 1.7–2.3)
POTASSIUM SERPL-SCNC: 4.2 MMOL/L (ref 3.4–5.3)
TRIGL SERPL-MCNC: 112 MG/DL

## 2024-09-30 PROCEDURE — 250N000013 HC RX MED GY IP 250 OP 250 PS 637: Performed by: INTERNAL MEDICINE

## 2024-09-30 PROCEDURE — 84132 ASSAY OF SERUM POTASSIUM: CPT | Performed by: INTERNAL MEDICINE

## 2024-09-30 PROCEDURE — 120N000001 HC R&B MED SURG/OB

## 2024-09-30 PROCEDURE — 99232 SBSQ HOSP IP/OBS MODERATE 35: CPT | Performed by: INTERNAL MEDICINE

## 2024-09-30 PROCEDURE — 250N000012 HC RX MED GY IP 250 OP 636 PS 637: Performed by: HOSPITALIST

## 2024-09-30 PROCEDURE — 83735 ASSAY OF MAGNESIUM: CPT | Performed by: INTERNAL MEDICINE

## 2024-09-30 PROCEDURE — 250N000013 HC RX MED GY IP 250 OP 250 PS 637: Performed by: HOSPITALIST

## 2024-09-30 PROCEDURE — 84478 ASSAY OF TRIGLYCERIDES: CPT | Performed by: INTERNAL MEDICINE

## 2024-09-30 PROCEDURE — 36415 COLL VENOUS BLD VENIPUNCTURE: CPT | Performed by: INTERNAL MEDICINE

## 2024-09-30 PROCEDURE — 250N000011 HC RX IP 250 OP 636: Performed by: HOSPITALIST

## 2024-09-30 RX ORDER — DIPHENHYDRAMINE HCL 25 MG
25-50 CAPSULE ORAL EVERY 6 HOURS PRN
Status: DISCONTINUED | OUTPATIENT
Start: 2024-09-30 | End: 2024-10-01 | Stop reason: HOSPADM

## 2024-09-30 RX ORDER — FAMOTIDINE 20 MG/1
20 TABLET, FILM COATED ORAL 2 TIMES DAILY
Status: COMPLETED | OUTPATIENT
Start: 2024-09-30 | End: 2024-10-01

## 2024-09-30 RX ADMIN — GABAPENTIN 1200 MG: 400 CAPSULE ORAL at 09:09

## 2024-09-30 RX ADMIN — DIPHENHYDRAMINE HYDROCHLORIDE 50 MG: 25 CAPSULE ORAL at 17:46

## 2024-09-30 RX ADMIN — TIZANIDINE 4 MG: 4 TABLET ORAL at 21:49

## 2024-09-30 RX ADMIN — FAMOTIDINE 20 MG: 20 TABLET, FILM COATED ORAL at 19:50

## 2024-09-30 RX ADMIN — NICOTINE POLACRILEX 2 MG: 2 GUM, CHEWING BUCCAL at 13:40

## 2024-09-30 RX ADMIN — OXYCODONE HYDROCHLORIDE 10 MG: 5 TABLET ORAL at 21:49

## 2024-09-30 RX ADMIN — HYDROMORPHONE HYDROCHLORIDE 0.5 MG: 1 INJECTION, SOLUTION INTRAMUSCULAR; INTRAVENOUS; SUBCUTANEOUS at 19:34

## 2024-09-30 RX ADMIN — GABAPENTIN 1200 MG: 400 CAPSULE ORAL at 13:39

## 2024-09-30 RX ADMIN — HYDROXYZINE HYDROCHLORIDE 25 MG: 25 TABLET ORAL at 09:18

## 2024-09-30 RX ADMIN — GABAPENTIN 1200 MG: 400 CAPSULE ORAL at 19:47

## 2024-09-30 RX ADMIN — OXYCODONE HYDROCHLORIDE 10 MG: 5 TABLET ORAL at 09:18

## 2024-09-30 RX ADMIN — INSULIN ASPART 1 UNITS: 100 INJECTION, SOLUTION INTRAVENOUS; SUBCUTANEOUS at 12:25

## 2024-09-30 RX ADMIN — NICOTINE POLACRILEX 4 MG: 2 GUM, CHEWING BUCCAL at 17:47

## 2024-09-30 RX ADMIN — NICOTINE POLACRILEX 2 MG: 2 GUM, CHEWING BUCCAL at 02:03

## 2024-09-30 RX ADMIN — HYDROMORPHONE HYDROCHLORIDE 0.5 MG: 1 INJECTION, SOLUTION INTRAMUSCULAR; INTRAVENOUS; SUBCUTANEOUS at 14:56

## 2024-09-30 RX ADMIN — OXYCODONE HYDROCHLORIDE 10 MG: 5 TABLET ORAL at 13:39

## 2024-09-30 RX ADMIN — CELECOXIB 100 MG: 100 CAPSULE ORAL at 21:49

## 2024-09-30 RX ADMIN — CELECOXIB 100 MG: 100 CAPSULE ORAL at 09:09

## 2024-09-30 RX ADMIN — ROSUVASTATIN CALCIUM 40 MG: 20 TABLET, FILM COATED ORAL at 19:49

## 2024-09-30 RX ADMIN — CYCLOBENZAPRINE 10 MG: 10 TABLET, FILM COATED ORAL at 11:25

## 2024-09-30 RX ADMIN — HYDROMORPHONE HYDROCHLORIDE 0.5 MG: 1 INJECTION, SOLUTION INTRAMUSCULAR; INTRAVENOUS; SUBCUTANEOUS at 02:02

## 2024-09-30 RX ADMIN — TRIAMCINOLONE ACETONIDE: 1 CREAM TOPICAL at 09:11

## 2024-09-30 RX ADMIN — NICOTINE POLACRILEX 2 MG: 2 GUM, CHEWING BUCCAL at 11:20

## 2024-09-30 RX ADMIN — NICOTINE POLACRILEX 4 MG: 2 GUM, CHEWING BUCCAL at 21:58

## 2024-09-30 RX ADMIN — DIPHENHYDRAMINE HYDROCHLORIDE 25 MG: 25 CAPSULE ORAL at 11:31

## 2024-09-30 RX ADMIN — LOSARTAN POTASSIUM 50 MG: 50 TABLET, FILM COATED ORAL at 09:09

## 2024-09-30 RX ADMIN — PANTOPRAZOLE SODIUM 40 MG: 40 TABLET, DELAYED RELEASE ORAL at 07:10

## 2024-09-30 RX ADMIN — DIPHENHYDRAMINE HYDROCHLORIDE 25 MG: 25 CAPSULE ORAL at 02:48

## 2024-09-30 RX ADMIN — OXYCODONE HYDROCHLORIDE 10 MG: 5 TABLET ORAL at 17:46

## 2024-09-30 RX ADMIN — HYDROMORPHONE HYDROCHLORIDE 0.5 MG: 1 INJECTION, SOLUTION INTRAMUSCULAR; INTRAVENOUS; SUBCUTANEOUS at 11:26

## 2024-09-30 RX ADMIN — TRIAMCINOLONE ACETONIDE: 1 CREAM TOPICAL at 19:53

## 2024-09-30 RX ADMIN — NICOTINE POLACRILEX 4 MG: 2 GUM, CHEWING BUCCAL at 19:50

## 2024-09-30 RX ADMIN — Medication: at 11:22

## 2024-09-30 RX ADMIN — NICOTINE POLACRILEX 4 MG: 2 GUM, CHEWING BUCCAL at 14:56

## 2024-09-30 ASSESSMENT — ACTIVITIES OF DAILY LIVING (ADL)
ADLS_ACUITY_SCORE: 20

## 2024-09-30 NOTE — PROGRESS NOTES
[facility]    Medicine Progress Note - Hospitalist Service    Date of Admission:  9/25/2024    Assessment & Plan     Ozzie Olmedo is a 43 year old male patient with past medical history of diabetes mellitus type 2, hypertension, hyperlipidemia, history of pancreatitis, came to emergency room with a complaint of abdominal pain and vomiting.      Acute pancreatitis.  -Suspect due to alcohol use.  -Needs long-term alcohol cessation.  -Symptoms continue improving.  -Advance to low-fat diet.  -Appreciate gastroenterology input.    Tobacco use disorder.  -I did reinforce recommendations for smoking cessation on 9/30.  -Nicotine gum as needed.     Chronic pain syndrome.  -Continue scheduled gabapentin.  -Additional pain medications as needed.     Hypertension.  -Continue losartan 50 mg a day.     Hyperlipidemia.  -Continue rosuvastatin 40 mg a day.     GERD.  -Change pantoprazole to oral.     Hypomagnesemia.  -Magnesium replacement protocol.     Diabetes mellitus type 2.  -Continue aspart insulin sliding scale as needed.    Contact dermatitis.  -New rash on bilateral feet with obvious demarcation line.  -Suspect due to new hospital provided booties/socks applied evening of 9/28.  -Increase available diphenhydramine to 25 to 50 mg every 6 hours as needed.  -Continue triamcinolone cream twice a day.  -Add famotidine 20 mg twice a day for 2 doses.          Diet: Low Fat Diet (up to 50g)    DVT Prophylaxis: Pneumatic Compression Devices  Castro Catheter: Not present  Lines: None     Cardiac Monitoring: None  Code Status: Full Code      Clinically Significant Risk Factors            # Hypomagnesemia: Lowest Mg = 1.6 mg/dL in last 2 days, will replace as needed                 # Obesity: Estimated body mass index is 35.01 kg/m  as calculated from the following:    Height as of this encounter: 1.829 m (6').    Weight as of this encounter: 117.1 kg (258 lb 2.5 oz).             Disposition Plan     Medically  Ready for Discharge: Anticipated Tomorrow             Marcin Murillo DO  Hospitalist Service  [facility]  Securely message with [email or web address] (more info)  Text page via [employer] Paging/Directory   ______________________________________________________________________    Interval History   Abdominal pain improving.  Having some pain in his feet due to rash.  Denies chest pain, shortness of breath, fevers, chills, nausea, vomiting.    Physical Exam   Vital Signs: Temp: 97.8  F (36.6  C) Temp src: Temporal BP: 122/75 Pulse: 70   Resp: 20 SpO2: 98 % O2 Device: None (Room air)    Weight: 258 lbs 2.54 oz    Gen:  NAD, A&Ox3.  Eyes:  PERRL, sclera anicteric.  OP:  MMM, no lesions.  Neck:  Supple.  CV:  Regular, no murmurs.  Lung:  CTA b/l, normal effort.  Ab:  +BS, soft.  Skin:  Warm, dry to touch.  Erythema bilateral feet.  Ext:  No pitting edema LE b/l.      Medical Decision Making       40 MINUTES SPENT BY ME on the date of service doing chart review, history, exam, documentation & further activities per the note.      Data     I have personally reviewed the following data over the past 24 hrs:    N/A  \   N/A   / N/A     N/A N/A N/A /  158 (H)   4.2 N/A N/A \       Imaging results reviewed over the past 24 hrs:   No results found for this or any previous visit (from the past 24 hour(s)).

## 2024-09-30 NOTE — PLAN OF CARE
"Bilat. ankles/feet reddened edematous & painful.  Tmax 100.7.  No nausea, tolerating full liquids diet, pt reported still mild abd. pain that tolerable.  Up ad ash., ambulating.  Voiding.      Goal Outcome Evaluation:    Plan of Care Reviewed With: patient    Overall Patient Progress: improvingOverall Patient Progress: improving    Outcome Evaluation: Tolerating full liquids.    Problem: Adult Inpatient Plan of Care  Goal: Plan of Care Review  Description: The Plan of Care Review/Shift note should be completed every shift.  The Outcome Evaluation is a brief statement about your assessment that the patient is improving, declining, or no change.  This information will be displayed automatically on your shift  note.  Outcome: Progressing  Flowsheets (Taken 9/29/2024 2026)  Outcome Evaluation: Tolerating full liquids.  Plan of Care Reviewed With: patient  Overall Patient Progress: improving  Goal: Patient-Specific Goal (Individualized)  Description: You can add care plan individualizations to a care plan. Examples of Individualization might be:  \"Parent requests to be called daily at 9am for status\", \"I have a hard time hearing out of my right ear\", or \"Do not touch me to wake me up as it startles  me\".  Outcome: Progressing  Goal: Absence of Hospital-Acquired Illness or Injury  Outcome: Progressing  Intervention: Identify and Manage Fall Risk  Recent Flowsheet Documentation  Taken 9/29/2024 0850 by Nancy Greenwood RN  Safety Promotion/Fall Prevention:   safety round/check completed   room organization consistent   lighting adjusted   clutter free environment maintained   assistive device/personal items within reach   patient and family education   nonskid shoes/slippers when out of bed  Intervention: Prevent Skin Injury  Recent Flowsheet Documentation  Taken 9/29/2024 1723 by Nancy Grenewood RN  Body Position: legs elevated  Taken 9/29/2024 1419 by Nancy Greenwood RN  Body Position: legs elevated  Intervention: Prevent " and Manage VTE (Venous Thromboembolism) Risk  Recent Flowsheet Documentation  Taken 9/29/2024 0850 by Nancy Greenwood RN  VTE Prevention/Management:   patient refused intervention   SCDs off (sequential compression devices)  Intervention: Prevent Infection  Recent Flowsheet Documentation  Taken 9/29/2024 0850 by Nancy Greenwood RN  Infection Prevention:   hand hygiene promoted   equipment surfaces disinfected  Goal: Optimal Comfort and Wellbeing  Outcome: Progressing  Intervention: Monitor Pain and Promote Comfort  Recent Flowsheet Documentation  Taken 9/29/2024 1723 by Nancy Greenwood RN  Pain Management Interventions: medication (see MAR)  Taken 9/29/2024 1600 by Nancy Greenwood RN  Pain Management Interventions: rest  Taken 9/29/2024 1419 by Nancy Greenwood RN  Pain Management Interventions: medication (see MAR)  Taken 9/29/2024 1400 by Nancy Greenwood RN  Pain Management Interventions: rest  Taken 9/29/2024 1056 by Nancy Greenwood RN  Pain Management Interventions: medication (see MAR)  Taken 9/29/2024 0920 by Nancy Greenwood RN  Pain Management Interventions: rest  Taken 9/29/2024 0850 by Nancy Greenwood RN  Pain Management Interventions:   pain management plan reviewed with patient/caregiver   medication (see MAR)  Goal: Readiness for Transition of Care  Outcome: Progressing     Problem: Pain Acute  Goal: Optimal Pain Control and Function  Outcome: Progressing  Intervention: Develop Pain Management Plan  Recent Flowsheet Documentation  Taken 9/29/2024 1723 by Nancy Greenwood RN  Pain Management Interventions: medication (see MAR)  Taken 9/29/2024 1600 by Nancy Greenwood RN  Pain Management Interventions: rest  Taken 9/29/2024 1419 by Nancy Greenwood RN  Pain Management Interventions: medication (see MAR)  Taken 9/29/2024 1400 by Nancy Greenwood RN  Pain Management Interventions: rest  Taken 9/29/2024 1056 by Nancy Greenwood RN  Pain Management Interventions: medication (see MAR)  Taken 9/29/2024 0920 by Nancy Greenwood  RN  Pain Management Interventions: rest  Taken 9/29/2024 0850 by Nancy Greenwood RN  Pain Management Interventions:   pain management plan reviewed with patient/caregiver   medication (see MAR)  Intervention: Prevent or Manage Pain  Recent Flowsheet Documentation  Taken 9/29/2024 0850 by Nancy Greenwood RN  Sensory Stimulation Regulation: care clustered  Bowel Elimination Promotion:   adequate fluid intake promoted   ambulation promoted   privacy promoted  Medication Review/Management: medications reviewed     Problem: Pancreatitis  Goal: Fluid and Electrolyte Balance  Outcome: Progressing  Intervention: Monitor and Manage Fluid and Electrolyte Balance  Recent Flowsheet Documentation  Taken 9/29/2024 0850 by Nancy Greenwood RN  Fluid/Electrolyte Management: fluids provided  Goal: Absence of Infection Signs and Symptoms  Outcome: Progressing  Goal: Optimal Nutrition Delivery  Outcome: Progressing  Goal: Optimal Pain Control and Function  Outcome: Progressing  Intervention: Monitor and Manage Pain  Recent Flowsheet Documentation  Taken 9/29/2024 1723 by Nancy Greenwood RN  Pain Management Interventions: medication (see MAR)  Taken 9/29/2024 1600 by Nnacy Greenwood RN  Pain Management Interventions: rest  Taken 9/29/2024 1419 by Nancy Greenwood RN  Pain Management Interventions: medication (see MAR)  Taken 9/29/2024 1400 by Nancy Greenwood RN  Pain Management Interventions: rest  Taken 9/29/2024 1056 by Nancy Greenwood RN  Pain Management Interventions: medication (see MAR)  Taken 9/29/2024 0920 by Nancy Greenwood RN  Pain Management Interventions: rest  Taken 9/29/2024 0850 by Nancy Greenwood RN  Pain Management Interventions:   pain management plan reviewed with patient/caregiver   medication (see MAR)  Sensory Stimulation Regulation: care clustered  Goal: Effective Oxygenation and Ventilation  Outcome: Progressing  Intervention: Optimize Oxygenation and Ventilation  Recent Flowsheet Documentation  Taken 9/29/2024 1723 by Timo  Nancy BETHEA, RN  Activity Management:   ambulated in room   up in chair   up ad ash  Taken 9/29/2024 1600 by Nancy Greenwood, RN  Activity Management:   ambulated outside room   up ad ash  Taken 9/29/2024 1419 by Nancy Greenwood, JOEL  Activity Management: up in chair  Taken 9/29/2024 1400 by Nancy Greenwood, RN  Activity Management:   ambulated in room   up ad ash  Taken 9/29/2024 0850 by Nancy Greenwood, RN  Activity Management:   ambulated in room   ambulated to bathroom   sitting, edge of bed   up ad ash  Taken 9/29/2024 0735 by Nancy Greenwood, RN  Activity Management:   ambulated outside room   up ad ash  Taken 9/29/2024 0700 by Nancy Greenwood, RN  Activity Management:   ambulated outside room   up ad ash

## 2024-09-30 NOTE — PLAN OF CARE
Goal Outcome Evaluation:      Plan of Care Reviewed With: patient    Overall Patient Progress: improvingOverall Patient Progress: improving    Outcome Evaluation: rates pain a 8 down to a 5 at the lowest, all pain in BLE. abdomen pain a 5. tolerating low fat diet. voiding, and had bm. up independent. walking in hallway. up in chair.    VS-stable. Afebrile.   Lung Sounds-clear, no cough, on room air. Taking deep breathes.   O2-on room air.   GI-+bs. +flatus. Had stool today. Tolerating low fat diet. Denies nausea. Abdomen pain a 5.   -voiding adequately.   IVF-sl iv. Iv dilaudid x 1.   Dressings-mepilex on BLE. Covering blisters. One blister popped. Mepilex changed.   CMS-denies numbness and tingling +pp. Both lower legs/ feet hurt, burn. +tender. Warm. Swollen. Blisters.   Drain-none.   Activity-up independently. Up in chair. Up in hallway.   Pain-rates pain a 8 down to a 5 at the lowest. On iv dilaudid, oxycodone. Atarax, flexeril. Gabapentin.   D/C Plan-home when able. Planning on tomorrow.   Nutrition will see pt for low fat diet.

## 2024-09-30 NOTE — PLAN OF CARE
"  Admitting Diagnosis: Alcohol-induced acute pancreatitis  Pertinent History: T2DM, HTN, HLD, pancreatitis    Neuro: Alert and Oriented x4  Activity: are independent with no assistive devices   Telemetry Monitoring: No  Pain: complaining of 8/10 pain in their BLE.  Dilaudid given for pain.  Labs / Tests: Mag and K protocol. No replacement given.  GI: Bowel sounds audible  : Voiding  O2: RA  LDA's: Peripheral  Fluids: is Saline locked.  Diet: Full liquid  Discharge Disposition:  TBD    Plan of Care:  Advancing diet and assessing Pt tolerance  Pain management      Problem: Adult Inpatient Plan of Care  Goal: Plan of Care Review  Description: The Plan of Care Review/Shift note should be completed every shift.  The Outcome Evaluation is a brief statement about your assessment that the patient is improving, declining, or no change.  This information will be displayed automatically on your shift  note.  9/30/2024 0756 by Hayley Nguyen RN  Outcome: Progressing  9/30/2024 0756 by Hayley Nguyen RN  Outcome: Progressing  9/30/2024 0755 by Hayley Nguyen RN  Outcome: Progressing  9/30/2024 0755 by Hayley Nguyen RN  Outcome: Progressing  9/30/2024 0752 by Hayley Nguyen RN  Outcome: Progressing  Flowsheets (Taken 9/30/2024 0752)  Outcome Evaluation: Pt complaining of 8/10 pain and itchiness in BLE. See MAR for interventions. Pt is tolerating full liquids.  Plan of Care Reviewed With: patient  Overall Patient Progress: improving  9/30/2024 0752 by Hayley Nguyen RN  Outcome: Progressing  Flowsheets (Taken 9/30/2024 0752)  Outcome Evaluation: Pt complaining of 8/10 pain and itchiness in BLE. See MAR for interventions. Pt is tolerating full liquids.  Plan of Care Reviewed With: patient  Overall Patient Progress: improving  Goal: Patient-Specific Goal (Individualized)  Description: You can add care plan individualizations to a care plan. Examples of Individualization might be:  \"Parent requests to be called daily " "at 9am for status\", \"I have a hard time hearing out of my right ear\", or \"Do not touch me to wake me up as it startles  me\".  9/30/2024 0756 by Hayley Nguyen RN  Outcome: Progressing  9/30/2024 0756 by Hayley Nguyen RN  Outcome: Progressing  9/30/2024 0755 by Hayley Nguyen RN  Outcome: Progressing  9/30/2024 0755 by Hayley Nguyen RN  Outcome: Progressing  9/30/2024 0752 by Hayley Nguyen RN  Outcome: Progressing  9/30/2024 0752 by Hayley Nguyen RN  Outcome: Progressing  Goal: Absence of Hospital-Acquired Illness or Injury  9/30/2024 0756 by Hayley Nguyen RN  Outcome: Progressing  9/30/2024 0756 by Hayley Nguyen RN  Outcome: Progressing  9/30/2024 0755 by Hayley Nguyen RN  Outcome: Progressing  9/30/2024 0755 by Hayley Nguyen RN  Outcome: Progressing  9/30/2024 0752 by Hayley Nguyen RN  Outcome: Progressing  9/30/2024 0752 by Hayley Nguyen RN  Outcome: Progressing  Intervention: Identify and Manage Fall Risk  Recent Flowsheet Documentation  Taken 9/30/2024 0202 by Hayley Nguyen RN  Safety Promotion/Fall Prevention:   safety round/check completed   room near nurse's station   room door open   nonskid shoes/slippers when out of bed   clutter free environment maintained   assistive device/personal items within reach  Intervention: Prevent Skin Injury  Recent Flowsheet Documentation  Taken 9/30/2024 0202 by Hayley Nguyen RN  Body Position: legs elevated  Intervention: Prevent Infection  Recent Flowsheet Documentation  Taken 9/30/2024 0202 by Hayley Nguyen RN  Infection Prevention:   single patient room provided   rest/sleep promoted   hand hygiene promoted  Goal: Optimal Comfort and Wellbeing  9/30/2024 0756 by Hayley Nguyen RN  Outcome: Progressing  9/30/2024 0756 by Hayley Nguyen RN  Outcome: Progressing  9/30/2024 0755 by Hayley Nguyen RN  Outcome: Progressing  9/30/2024 0755 by Hayley Nguyen RN  Outcome: Progressing  9/30/2024 0752 by Hayley Nguyen, " RN  Outcome: Progressing  9/30/2024 0752 by Hayley Nguyen RN  Outcome: Progressing  Intervention: Monitor Pain and Promote Comfort  Recent Flowsheet Documentation  Taken 9/30/2024 0202 by Hayley Nguyen RN  Pain Management Interventions: medication (see MAR)  Goal: Readiness for Transition of Care  9/30/2024 0756 by Hayley Nguyen RN  Outcome: Progressing  9/30/2024 0756 by Hayley Nguyen RN  Outcome: Progressing  9/30/2024 0755 by Hayley Nguyen RN  Outcome: Progressing  9/30/2024 0755 by Hayley Nguyen RN  Outcome: Progressing  9/30/2024 0752 by Hayley Nguyen RN  Outcome: Progressing  9/30/2024 0752 by Hayley Nguyen RN  Outcome: Progressing     Problem: Pain Acute  Goal: Optimal Pain Control and Function  9/30/2024 0756 by Hayley Nguyen RN  Outcome: Progressing  9/30/2024 0756 by Hayley Nguyen RN  Outcome: Progressing  9/30/2024 0755 by Hayley Nguyen RN  Outcome: Progressing  9/30/2024 0755 by Hayley Nguyen RN  Outcome: Progressing  9/30/2024 0752 by Hayley Nguyen RN  Outcome: Progressing  9/30/2024 0752 by Hayley Nguyen RN  Outcome: Progressing  Intervention: Develop Pain Management Plan  Recent Flowsheet Documentation  Taken 9/30/2024 0202 by Hayley Nguyen RN  Pain Management Interventions: medication (see MAR)  Intervention: Prevent or Manage Pain  Recent Flowsheet Documentation  Taken 9/30/2024 0202 by Hayley Nguyen RN  Medication Review/Management: medications reviewed     Problem: Pancreatitis  Goal: Fluid and Electrolyte Balance  9/30/2024 0756 by Hayley Nguyen RN  Outcome: Progressing  9/30/2024 0756 by Hayley Nguyen RN  Outcome: Progressing  9/30/2024 0755 by Hayley Nguyen RN  Outcome: Progressing  9/30/2024 0755 by Hayley Nguyen RN  Outcome: Progressing  9/30/2024 0752 by Hayley Nguyen RN  Outcome: Progressing  9/30/2024 0752 by Hayley Nguyen, RN  Outcome: Progressing  Goal: Absence of Infection Signs and Symptoms  9/30/2024 0756 by Patrick  JOEL Hardy  Outcome: Progressing  9/30/2024 0756 by Hayley Nguyen RN  Outcome: Progressing  9/30/2024 0755 by Hayley Nguyen, RN  Outcome: Progressing  9/30/2024 0755 by Hayley Nguyen RN  Outcome: Progressing  9/30/2024 0752 by Hayley Nguyen, RN  Outcome: Progressing  9/30/2024 0752 by Hayley Nguyen, RN  Outcome: Progressing  Goal: Optimal Nutrition Delivery  9/30/2024 0756 by Hayley Nguyen, RN  Outcome: Progressing  9/30/2024 0756 by Hayley Nguyen RN  Outcome: Progressing  9/30/2024 0755 by Hayley Nguyen, RN  Outcome: Progressing  9/30/2024 0755 by Hayley Nguyen RN  Outcome: Progressing  9/30/2024 0752 by Hayley Nguyen, RN  Outcome: Progressing  9/30/2024 0752 by Hayley Nguyen RN  Outcome: Progressing  Goal: Optimal Pain Control and Function  9/30/2024 0756 by Hayley Nguyen RN  Outcome: Progressing  9/30/2024 0756 by Hayley Nguyen RN  Outcome: Progressing  9/30/2024 0755 by Hayley Nguyen RN  Outcome: Progressing  9/30/2024 0755 by Hayley Nguyen, RN  Outcome: Progressing  9/30/2024 0752 by Hayley Nguyen, RN  Outcome: Progressing  9/30/2024 0752 by Hayley Nguyen, RN  Outcome: Progressing  Intervention: Monitor and Manage Pain  Recent Flowsheet Documentation  Taken 9/30/2024 0202 by Hayley Nguyen RN  Pain Management Interventions: medication (see MAR)  Goal: Effective Oxygenation and Ventilation  9/30/2024 0756 by Hayley Nguyen RN  Outcome: Progressing  9/30/2024 0756 by Hayley Nguyen RN  Outcome: Progressing  9/30/2024 0755 by Hayley Nguyen, RN  Outcome: Progressing  9/30/2024 0755 by Hayley Nguyen, RN  Outcome: Progressing  9/30/2024 0752 by Hayley Nguyen, RN  Outcome: Progressing  9/30/2024 0752 by Hayley Nguyen, RN  Outcome: Progressing  Intervention: Optimize Oxygenation and Ventilation  Recent Flowsheet Documentation  Taken 9/30/2024 0202 by Hayley Nguyen, RN  Cough And Deep Breathing: done independently per patient  Activity Management:   ambulated in  room   up in chair   up ad ash

## 2024-09-30 NOTE — PLAN OF CARE
"1900 - 2330     AO4. VSS on RA. PRN oxy and dilaudid given for pain, benadryl for itching. Ambulates in and out of room independently. Continent of bladder and bowel. BG ACHS, no coverage given.       Goal Outcome Evaluation:      Plan of Care Reviewed With: patient    Overall Patient Progress: improvingOverall Patient Progress: improving    Outcome Evaluation: VSS on RA. Ambulates in/out of room independently. PRN oxy, dilaudid and benadryl for pain and itching.      Problem: Adult Inpatient Plan of Care  Goal: Plan of Care Review  Description: The Plan of Care Review/Shift note should be completed every shift.  The Outcome Evaluation is a brief statement about your assessment that the patient is improving, declining, or no change.  This information will be displayed automatically on your shift  note.  Outcome: Progressing  Flowsheets (Taken 9/29/2024 2347)  Outcome Evaluation: VSS on RA. Ambulates in/out of room independently. PRN oxy, dilaudid and benadryl for pain and itching.  Plan of Care Reviewed With: patient  Overall Patient Progress: improving  Goal: Patient-Specific Goal (Individualized)  Description: You can add care plan individualizations to a care plan. Examples of Individualization might be:  \"Parent requests to be called daily at 9am for status\", \"I have a hard time hearing out of my right ear\", or \"Do not touch me to wake me up as it startles  me\".  Outcome: Progressing  Goal: Absence of Hospital-Acquired Illness or Injury  Outcome: Progressing  Goal: Optimal Comfort and Wellbeing  Outcome: Progressing  Intervention: Monitor Pain and Promote Comfort  Recent Flowsheet Documentation  Taken 9/29/2024 2031 by Toan Ryan, RN  Pain Management Interventions: medication (see MAR)  Goal: Readiness for Transition of Care  Outcome: Progressing     Problem: Pain Acute  Goal: Optimal Pain Control and Function  Outcome: Progressing  Intervention: Develop Pain Management Plan  Recent Flowsheet " Documentation  Taken 9/29/2024 2031 by Toan Ryan, RN  Pain Management Interventions: medication (see MAR)     Problem: Pancreatitis  Goal: Fluid and Electrolyte Balance  Outcome: Progressing  Goal: Absence of Infection Signs and Symptoms  Outcome: Progressing  Goal: Optimal Nutrition Delivery  Outcome: Progressing  Goal: Optimal Pain Control and Function  Outcome: Progressing  Intervention: Monitor and Manage Pain  Recent Flowsheet Documentation  Taken 9/29/2024 2031 by Toan Ryan, RN  Pain Management Interventions: medication (see MAR)  Goal: Effective Oxygenation and Ventilation  Outcome: Progressing

## 2024-10-01 VITALS
HEART RATE: 73 BPM | SYSTOLIC BLOOD PRESSURE: 136 MMHG | WEIGHT: 258.16 LBS | TEMPERATURE: 97.1 F | HEIGHT: 72 IN | DIASTOLIC BLOOD PRESSURE: 84 MMHG | RESPIRATION RATE: 18 BRPM | BODY MASS INDEX: 34.97 KG/M2 | OXYGEN SATURATION: 97 %

## 2024-10-01 LAB
ANION GAP SERPL CALCULATED.3IONS-SCNC: 14 MMOL/L (ref 7–15)
BUN SERPL-MCNC: 9.8 MG/DL (ref 6–20)
CALCIUM SERPL-MCNC: 9.6 MG/DL (ref 8.8–10.4)
CHLORIDE SERPL-SCNC: 96 MMOL/L (ref 98–107)
CREAT SERPL-MCNC: 1.07 MG/DL (ref 0.67–1.17)
EGFRCR SERPLBLD CKD-EPI 2021: 89 ML/MIN/1.73M2
GLUCOSE BLDC GLUCOMTR-MCNC: 123 MG/DL (ref 70–99)
GLUCOSE BLDC GLUCOMTR-MCNC: 135 MG/DL (ref 70–99)
GLUCOSE BLDC GLUCOMTR-MCNC: 142 MG/DL (ref 70–99)
GLUCOSE SERPL-MCNC: 157 MG/DL (ref 70–99)
HCO3 SERPL-SCNC: 24 MMOL/L (ref 22–29)
MAGNESIUM SERPL-MCNC: 1.6 MG/DL (ref 1.7–2.3)
POTASSIUM SERPL-SCNC: 4.6 MMOL/L (ref 3.4–5.3)
SODIUM SERPL-SCNC: 134 MMOL/L (ref 135–145)

## 2024-10-01 PROCEDURE — 36415 COLL VENOUS BLD VENIPUNCTURE: CPT | Performed by: INTERNAL MEDICINE

## 2024-10-01 PROCEDURE — 250N000013 HC RX MED GY IP 250 OP 250 PS 637: Performed by: HOSPITALIST

## 2024-10-01 PROCEDURE — 250N000013 HC RX MED GY IP 250 OP 250 PS 637: Performed by: INTERNAL MEDICINE

## 2024-10-01 PROCEDURE — 99239 HOSP IP/OBS DSCHRG MGMT >30: CPT | Performed by: INTERNAL MEDICINE

## 2024-10-01 PROCEDURE — 80048 BASIC METABOLIC PNL TOTAL CA: CPT | Performed by: INTERNAL MEDICINE

## 2024-10-01 PROCEDURE — 83735 ASSAY OF MAGNESIUM: CPT | Performed by: INTERNAL MEDICINE

## 2024-10-01 RX ORDER — HYDROMORPHONE HYDROCHLORIDE 1 MG/ML
0.3 INJECTION, SOLUTION INTRAMUSCULAR; INTRAVENOUS; SUBCUTANEOUS
Status: DISCONTINUED | OUTPATIENT
Start: 2024-10-01 | End: 2024-10-01

## 2024-10-01 RX ORDER — PANTOPRAZOLE SODIUM 40 MG/1
40 TABLET, DELAYED RELEASE ORAL DAILY
Qty: 30 TABLET | Refills: 0 | Status: SHIPPED | OUTPATIENT
Start: 2024-10-01

## 2024-10-01 RX ORDER — TRIAMCINOLONE ACETONIDE 1 MG/G
CREAM TOPICAL 2 TIMES DAILY
Qty: 15 G | Refills: 0 | Status: SHIPPED | OUTPATIENT
Start: 2024-10-01

## 2024-10-01 RX ADMIN — CYCLOBENZAPRINE 10 MG: 10 TABLET, FILM COATED ORAL at 10:15

## 2024-10-01 RX ADMIN — HYDROXYZINE HYDROCHLORIDE 25 MG: 25 TABLET ORAL at 00:42

## 2024-10-01 RX ADMIN — DIPHENHYDRAMINE HYDROCHLORIDE 25 MG: 25 CAPSULE ORAL at 10:14

## 2024-10-01 RX ADMIN — GABAPENTIN 1200 MG: 400 CAPSULE ORAL at 08:57

## 2024-10-01 RX ADMIN — FAMOTIDINE 20 MG: 20 TABLET, FILM COATED ORAL at 08:57

## 2024-10-01 RX ADMIN — DIPHENHYDRAMINE HYDROCHLORIDE 50 MG: 25 CAPSULE ORAL at 02:06

## 2024-10-01 RX ADMIN — OXYCODONE HYDROCHLORIDE 10 MG: 5 TABLET ORAL at 02:06

## 2024-10-01 RX ADMIN — NICOTINE POLACRILEX 4 MG: 2 GUM, CHEWING BUCCAL at 10:15

## 2024-10-01 RX ADMIN — OXYCODONE HYDROCHLORIDE 5 MG: 5 TABLET ORAL at 08:56

## 2024-10-01 RX ADMIN — CYCLOBENZAPRINE 10 MG: 10 TABLET, FILM COATED ORAL at 00:42

## 2024-10-01 RX ADMIN — NICOTINE POLACRILEX 4 MG: 2 GUM, CHEWING BUCCAL at 02:11

## 2024-10-01 RX ADMIN — OXYCODONE HYDROCHLORIDE 5 MG: 5 TABLET ORAL at 10:14

## 2024-10-01 RX ADMIN — PANTOPRAZOLE SODIUM 40 MG: 40 TABLET, DELAYED RELEASE ORAL at 08:57

## 2024-10-01 RX ADMIN — LOSARTAN POTASSIUM 50 MG: 50 TABLET, FILM COATED ORAL at 08:56

## 2024-10-01 RX ADMIN — NICOTINE POLACRILEX 2 MG: 2 GUM, CHEWING BUCCAL at 00:44

## 2024-10-01 RX ADMIN — CELECOXIB 100 MG: 100 CAPSULE ORAL at 08:56

## 2024-10-01 ASSESSMENT — ACTIVITIES OF DAILY LIVING (ADL)
ADLS_ACUITY_SCORE: 20

## 2024-10-01 NOTE — PLAN OF CARE
Goal Outcome Evaluation:      Plan of Care Reviewed With: patient    Overall Patient Progress: improvingOverall Patient Progress: improving    Outcome Evaluation: A&O x4, independent in room, VSS on RA, ACHS glucose, voiding ok, pain mngd w/ oxycodone, dilaudid, tizanidine, BLE redness/edema mepilex & ACE wrapped for pt comfort, DC plans home tomorrow    Problem: Adult Inpatient Plan of Care  Goal: Plan of Care Review  Description: The Plan of Care Review/Shift note should be completed every shift.  The Outcome Evaluation is a brief statement about your assessment that the patient is improving, declining, or no change.  This information will be displayed automatically on your shift  note.  Recent Flowsheet Documentation  Taken 9/30/2024 2226 by Luh Nugent RN  Outcome Evaluation: A&O x4, independent in room, VSS on RA, ACHS glucose, voiding ok, pain mngd w/ oxycodone, dilaudid, tizanidine, BLE redness/edema mepilex & ACE wrapped for pt comfort, DC plans home tomorrow  Plan of Care Reviewed With: patient  Overall Patient Progress: improving  Taken 9/30/2024 2223 by Luh Nugent RN  Outcome Evaluation: A&O x4, independent in room, voiding ok, pain mngd w/ oxycodone, dilaudid, tizanidine, BLE redness/edema mepilex & ACE wrapped for pt comfort, DC plans home tomorrow  Plan of Care Reviewed With: patient  Overall Patient Progress: improving  Goal: Absence of Hospital-Acquired Illness or Injury  Intervention: Identify and Manage Fall Risk  Recent Flowsheet Documentation  Taken 9/30/2024 1700 by Luh Nugent RN  Safety Promotion/Fall Prevention: safety round/check completed  Intervention: Prevent Skin Injury  Recent Flowsheet Documentation  Taken 9/30/2024 1700 by Luh Nugent, RN  Body Position: position changed independently  Skin Protection: adhesive use limited  Device Skin Pressure Protection: tubing/devices free from skin contact  Intervention: Prevent and Manage VTE (Venous  Thromboembolism) Risk  Recent Flowsheet Documentation  Taken 9/30/2024 1700 by Luh Nugent RN  VTE Prevention/Management: patient refused intervention  Intervention: Prevent Infection  Recent Flowsheet Documentation  Taken 9/30/2024 1700 by Luh Nugent RN  Infection Prevention: hand hygiene promoted  Goal: Optimal Comfort and Wellbeing  Intervention: Monitor Pain and Promote Comfort  Recent Flowsheet Documentation  Taken 9/30/2024 2037 by Luh Nugent RN  Pain Management Interventions: medication (see MAR)  Taken 9/30/2024 1828 by Luh Nugent RN  Pain Management Interventions: medication (see MAR)  Taken 9/30/2024 1719 by Luh Nugent RN  Pain Management Interventions: medication (see MAR)     Problem: Adult Inpatient Plan of Care  Goal: Plan of Care Review  Description: The Plan of Care Review/Shift note should be completed every shift.  The Outcome Evaluation is a brief statement about your assessment that the patient is improving, declining, or no change.  This information will be displayed automatically on your shift  note.  9/30/2024 2226 by Luh Nugent RN  Outcome: Progressing  Flowsheets (Taken 9/30/2024 2226)  Outcome Evaluation: A&O x4, independent in room, VSS on RA, ACHS glucose, voiding ok, pain mngd w/ oxycodone, dilaudid, tizanidine, BLE redness/edema mepilex & ACE wrapped for pt comfort, DC plans home tomorrow  Plan of Care Reviewed With: patient  Overall Patient Progress: improving  9/30/2024 2225 by Luh Nugent RN  Outcome: Progressing  Flowsheets (Taken 9/30/2024 2223)  Outcome Evaluation: A&O x4, independent in room, voiding ok, pain mngd w/ oxycodone, dilaudid, tizanidine, BLE redness/edema mepilex & ACE wrapped for pt comfort, DC plans home tomorrow  Plan of Care Reviewed With: patient  Overall Patient Progress: improving  Goal: Patient-Specific Goal (Individualized)  Description: You can add care plan individualizations to a care plan.  "Examples of Individualization might be:  \"Parent requests to be called daily at 9am for status\", \"I have a hard time hearing out of my right ear\", or \"Do not touch me to wake me up as it startles  me\".  9/30/2024 2226 by Luh Nugent RN  Outcome: Progressing  9/30/2024 2225 by Luh Nugent RN  Outcome: Progressing  Goal: Absence of Hospital-Acquired Illness or Injury  9/30/2024 2226 by Luh Nugent RN  Outcome: Progressing  9/30/2024 2225 by Luh Nugent RN  Outcome: Progressing  Intervention: Identify and Manage Fall Risk  Recent Flowsheet Documentation  Taken 9/30/2024 1700 by Luh Nugent RN  Safety Promotion/Fall Prevention: safety round/check completed  Intervention: Prevent Skin Injury  Recent Flowsheet Documentation  Taken 9/30/2024 1700 by Luh Nugent RN  Body Position: position changed independently  Skin Protection: adhesive use limited  Device Skin Pressure Protection: tubing/devices free from skin contact  Intervention: Prevent and Manage VTE (Venous Thromboembolism) Risk  Recent Flowsheet Documentation  Taken 9/30/2024 1700 by Luh Nugent RN  VTE Prevention/Management: patient refused intervention  Intervention: Prevent Infection  Recent Flowsheet Documentation  Taken 9/30/2024 1700 by Luh Nugent RN  Infection Prevention: hand hygiene promoted  Goal: Optimal Comfort and Wellbeing  9/30/2024 2226 by Luh Nugent RN  Outcome: Progressing  9/30/2024 2225 by Luh Nugent RN  Outcome: Progressing  Intervention: Monitor Pain and Promote Comfort  Recent Flowsheet Documentation  Taken 9/30/2024 2037 by Luh Nugent RN  Pain Management Interventions: medication (see MAR)  Taken 9/30/2024 1828 by Luh Nugent RN  Pain Management Interventions: medication (see MAR)  Taken 9/30/2024 1719 by Luh Nugent RN  Pain Management Interventions: medication (see MAR)  Goal: Readiness for Transition of Care  9/30/2024 2226 by " Luh Nugent RN  Outcome: Progressing  9/30/2024 2225 by Luh Nugent RN  Outcome: Progressing     Problem: Pain Acute  Goal: Optimal Pain Control and Function  Intervention: Develop Pain Management Plan  Recent Flowsheet Documentation  Taken 9/30/2024 2037 by Luh Nugent RN  Pain Management Interventions: medication (see MAR)  Taken 9/30/2024 1828 by Luh Nugent RN  Pain Management Interventions: medication (see MAR)  Taken 9/30/2024 1719 by Luh Nugent RN  Pain Management Interventions: medication (see MAR)  Intervention: Prevent or Manage Pain  Recent Flowsheet Documentation  Taken 9/30/2024 1700 by Luh Nugent RN  Sensory Stimulation Regulation: care clustered  Bowel Elimination Promotion: adequate fluid intake promoted  Medication Review/Management: medications reviewed  Intervention: Optimize Psychosocial Wellbeing  Recent Flowsheet Documentation  Taken 9/30/2024 1700 by Luh Nugent RN  Supportive Measures: active listening utilized     Problem: Pain Acute  Goal: Optimal Pain Control and Function  9/30/2024 2226 by Luh Nugent RN  Outcome: Progressing  9/30/2024 2225 by Luh Nugent RN  Outcome: Progressing  Intervention: Develop Pain Management Plan  Recent Flowsheet Documentation  Taken 9/30/2024 2037 by Luh Nugent RN  Pain Management Interventions: medication (see MAR)  Taken 9/30/2024 1828 by Luh Nugent RN  Pain Management Interventions: medication (see MAR)  Taken 9/30/2024 1719 by Luh Nugent RN  Pain Management Interventions: medication (see MAR)  Intervention: Prevent or Manage Pain  Recent Flowsheet Documentation  Taken 9/30/2024 1700 by Luh Nugent RN  Sensory Stimulation Regulation: care clustered  Bowel Elimination Promotion: adequate fluid intake promoted  Medication Review/Management: medications reviewed  Intervention: Optimize Psychosocial Wellbeing  Recent Flowsheet Documentation  Taken  9/30/2024 1700 by Luh Nugent, RN  Supportive Measures: active listening utilized     Problem: Infection  Goal: Absence of Infection Signs and Symptoms  Intervention: Prevent or Manage Infection  Recent Flowsheet Documentation  Taken 9/30/2024 1700 by Luh Nugent, RN  Infection Management: aseptic technique maintained

## 2024-10-01 NOTE — PLAN OF CARE
"Pt discharged home at 1250. AVS reviewed. Meds provided.       Goal Outcome Evaluation:         Problem: Adult Inpatient Plan of Care  Goal: Plan of Care Review  Description: The Plan of Care Review/Shift note should be completed every shift.  The Outcome Evaluation is a brief statement about your assessment that the patient is improving, declining, or no change.  This information will be displayed automatically on your shift  note.  Outcome: Met  Goal: Patient-Specific Goal (Individualized)  Description: You can add care plan individualizations to a care plan. Examples of Individualization might be:  \"Parent requests to be called daily at 9am for status\", \"I have a hard time hearing out of my right ear\", or \"Do not touch me to wake me up as it startles  me\".  Outcome: Met  Goal: Absence of Hospital-Acquired Illness or Injury  Outcome: Met  Intervention: Identify and Manage Fall Risk  Recent Flowsheet Documentation  Taken 10/1/2024 0900 by Nichol Calixto RN  Safety Promotion/Fall Prevention: safety round/check completed  Intervention: Prevent Skin Injury  Recent Flowsheet Documentation  Taken 10/1/2024 0900 by Nichol Calixto RN  Body Position: position changed independently  Skin Protection: adhesive use limited  Intervention: Prevent Infection  Recent Flowsheet Documentation  Taken 10/1/2024 0900 by Nichol Calixto RN  Infection Prevention: single patient room provided  Goal: Optimal Comfort and Wellbeing  Outcome: Met  Intervention: Monitor Pain and Promote Comfort  Recent Flowsheet Documentation  Taken 10/1/2024 0901 by Nichol Calixto RN  Pain Management Interventions: medication (see MAR)  Goal: Readiness for Transition of Care  Outcome: Met     Problem: Pain Acute  Goal: Optimal Pain Control and Function  Outcome: Met  Intervention: Develop Pain Management Plan  Recent Flowsheet Documentation  Taken 10/1/2024 0901 by Nichol Calixto RN  Pain Management Interventions: medication (see MAR)  Intervention: " Prevent or Manage Pain  Recent Flowsheet Documentation  Taken 10/1/2024 0900 by Nichol Calixto RN  Medication Review/Management: medications reviewed     Problem: Pancreatitis  Goal: Fluid and Electrolyte Balance  Outcome: Met  Goal: Absence of Infection Signs and Symptoms  Outcome: Met  Goal: Optimal Nutrition Delivery  Outcome: Met  Goal: Optimal Pain Control and Function  Outcome: Met  Intervention: Monitor and Manage Pain  Recent Flowsheet Documentation  Taken 10/1/2024 0901 by Nichol Calixto RN  Pain Management Interventions: medication (see MAR)  Goal: Effective Oxygenation and Ventilation  Outcome: Met     Problem: Adult Inpatient Plan of Care  Goal: Absence of Hospital-Acquired Illness or Injury  Intervention: Identify and Manage Fall Risk  Recent Flowsheet Documentation  Taken 10/1/2024 0900 by Nichol Calixto RN  Safety Promotion/Fall Prevention: safety round/check completed  Intervention: Prevent Skin Injury  Recent Flowsheet Documentation  Taken 10/1/2024 0900 by Nichol Calixto RN  Body Position: position changed independently  Skin Protection: adhesive use limited  Intervention: Prevent Infection  Recent Flowsheet Documentation  Taken 10/1/2024 0900 by Nichol Calixto RN  Infection Prevention: single patient room provided  Goal: Optimal Comfort and Wellbeing  Intervention: Monitor Pain and Promote Comfort  Recent Flowsheet Documentation  Taken 10/1/2024 0901 by Nichol Calixto RN  Pain Management Interventions: medication (see MAR)     Problem: Pain Acute  Goal: Optimal Pain Control and Function  Intervention: Develop Pain Management Plan  Recent Flowsheet Documentation  Taken 10/1/2024 0901 by Nichol Calixto RN  Pain Management Interventions: medication (see MAR)  Intervention: Prevent or Manage Pain  Recent Flowsheet Documentation  Taken 10/1/2024 0900 by Nichol Calixto RN  Medication Review/Management: medications reviewed

## 2024-10-01 NOTE — DISCHARGE SUMMARY
[facility]  Hospitalist Discharge Summary      Date of Admission:  9/25/2024  Date of Discharge:  10/1/2024  Discharging Provider: Sonu De Luna MD, MD  Discharge Service: Hospitalist Service    Discharge Diagnoses   Acute alcohol induced acute pancreatitis  Alcohol misuse versus abuse  Chronic pain syndrome   tobacco abuse  GERD  Hypomagnesemia  Diabetes mellitus type 2  Contact dermatitis      Clinically Significant Risk Factors     # Obesity: Estimated body mass index is 35.01 kg/m  as calculated from the following:    Height as of this encounter: 1.829 m (6').    Weight as of this encounter: 117.1 kg (258 lb 2.5 oz).       Follow-ups Needed After Discharge   Follow-up Appointments     Follow-up and recommended labs and tests       Follow up with primary care provider, Bridget Lange, within 7 days to   evaluate medication change, to evaluate treatment change, and for hospital   follow- up.    Alcohol cessation program if desired            Unresulted Labs Ordered in the Past 30 Days of this Admission       No orders found from 8/26/2024 to 9/26/2024.            Discharge Disposition   Discharged to home  Condition at discharge: Stable    Hospital Course     Ozzie is a young pleasant gentleman with background history of diabetes mellitus, alcohol misuse versus abuse disorder who presented with increasing abdominal symptoms of nausea, vomiting and abdominal pain eventually found with underlying alcohol induced pancreatitis.  Fortunately he improved with conservative approach with pain control, aggressive IV fluid support and slow advancement of diet.  Currently he is doing well as he remained ambulatory, able to tolerate oral diet and denies any ongoing abdominal symptomatology.  He is demonstrating stable hemodynamics and currently not requiring any oxygen support.  Remained to be afebrile.  He is hopeful for hospital discharge and will proceed with that today.  No new prescriptions  for oral narcotics as patient continues to have alcohol misuse versus abuse disorder and he usually gets his pain regimen prescriptions from his clinic providers.  Will defer this to his outpatient providers.  Discussed with him regarding importance of alcohol cessation.  He mentioned that he will attempt to do alcohol cessation by himself as he was able to do it for several years previously.        Ozzie Olmedo is a 43 year old male patient with past medical history of diabetes mellitus type 2, hypertension, hyperlipidemia, history of pancreatitis, came to emergency room with a complaint of abdominal pain and vomiting.      Acute pancreatitis.  -Suspect due to alcohol use.  -Needs long-term alcohol cessation.  -Symptoms continue improving.  -Advance to low-fat diet.  -Appreciate gastroenterology input.    Tobacco use disorder.  -I did reinforce recommendations for smoking cessation on 9/30.  -Nicotine gum as needed.     Chronic pain syndrome.  -Continue scheduled gabapentin.  -Additional pain medications as needed.     Hypertension.  -Continue losartan 50 mg a day.     Hyperlipidemia.  -Continue rosuvastatin 40 mg a day.     GERD.  -Change pantoprazole to oral.     Hypomagnesemia.  -Magnesium replacement protocol.     Diabetes mellitus type 2.  -Continue aspart insulin sliding scale as needed.    Contact dermatitis.  -New rash on bilateral feet with obvious demarcation line.  -Suspect due to new hospital provided booties/socks applied evening of 9/28.  -Increase available diphenhydramine to 25 to 50 mg every 6 hours as needed.  -Continue triamcinolone cream twice a day.  -Add famotidine 20 mg twice a day for 2 doses.    Consultations This Hospital Stay   GASTROENTEROLOGY IP CONSULT    Code Status   Full Code    Time Spent on this Encounter   I, Sonu De Luna MD, MD, personally saw the patient today and spent greater than 30 minutes discharging this patient.       Sonu De Luna MD, MD  [facility]  [facility]TH[facility]  [address]  [facility] [address]  Phone: [phone]  Fax: [phone]  ______________________________________________________________________    Physical Exam   Vital Signs: Temp: 97.1  F (36.2  C) Temp src: Temporal BP: 136/84 Pulse: 73   Resp: 18 SpO2: 97 % O2 Device: None (Room air)    Weight: 258 lbs 2.54 oz  HEENT; Atraumatic, normocephalic, pinkish conjuctiva, pupils bilateral reactive   Skin: warm and moist, no rashes  Lymphatics: no cervical or axillary lymphandenopathy  Lungs: equal chest expansion, clear to auscultation, no wheezes, no stridor, no crackles,   Heart: normal rate, normal rhythm, no rubs or gallops.   Abdomen: normal bowel sounds, no tenderness, no peritoneal signs, no guarding  Extremities: no deformities, no edema   Neuro; follow commands, alert and oriented x3, spontaneous speech, coherent, moves all extremities spontaneously  Psych; no hallucination, euthymic mood, not agitated         Primary Care Physician   Bridget Lange    Discharge Orders      Reason for your hospital stay    Ozzie is a young pleasant gentleman with background history of diabetes mellitus, alcohol misuse versus abuse disorder who presented with increasing abdominal symptoms of nausea, vomiting and abdominal pain eventually found with underlying alcohol induced pancreatitis.  Fortunately he improved with conservative approach with pain control, aggressive IV fluid support and slow advancement of diet.  Currently he is doing well as he remained ambulatory, able to tolerate oral diet and denies any ongoing abdominal symptomatology.  He is demonstrating stable hemodynamics and currently not requiring any oxygen support.  Remained to be afebrile.  He is hopeful for hospital discharge and will proceed with that today.  No new prescriptions for oral narcotics as patient continues to have alcohol misuse versus abuse disorder and he usually gets his pain regimen prescriptions from  his clinic providers.  Will defer this to his outpatient providers.  Discussed with him regarding importance of alcohol cessation.  He mentioned that he will attempt to do alcohol cessation by himself as he was able to do it for several years previously.     Follow-up and recommended labs and tests     Follow up with primary care provider, Bridget Lange, within 7 days to evaluate medication change, to evaluate treatment change, and for hospital follow- up.    Alcohol cessation program if desired     Activity    Your activity upon discharge: activity as tolerated     Full Code     Diet    Follow this diet upon discharge: Current Diet:Orders Placed This Encounter      Low Fat Diet (up to 50g)       Significant Results and Procedures   Most Recent 3 CBC's:  Recent Labs   Lab Test 09/28/24  0912 09/26/24  0605 09/25/24  1549   WBC 7.6 9.8 7.9   HGB 13.9 14.3 15.2   MCV 94 94 93    220 253     Most Recent 3 BMP's:  Recent Labs   Lab Test 10/01/24  0829 10/01/24  0753 10/01/24  0216 09/30/24  0910 09/30/24  0607 09/29/24  0757 09/29/24  0631 09/28/24  1210 09/28/24  0912 09/28/24  0631 09/27/24  0612   NA  --  134*  --   --   --   --   --   --  138  --  135   POTASSIUM  --  4.6  --   --  4.2  --  4.6  --  4.1   < > 3.9   CHLORIDE  --  96*  --   --   --   --   --   --  98  --  98   CO2  --  24  --   --   --   --   --   --  25  --  22   BUN  --  9.8  --   --   --   --   --   --  6.2  --  9.4   CR  --  1.07  --   --   --   --   --   --  0.84  --  0.85   ANIONGAP  --  14  --   --   --   --   --   --  15  --  15   SYLVESTER  --  9.6  --   --   --   --   --   --  8.8  --  8.8   * 157* 123*   < >  --    < >  --    < > 95  95  --  108*    < > = values in this interval not displayed.     Most Recent 2 LFT's:  Recent Labs   Lab Test 09/28/24  0912 09/26/24  0605   AST 32 23   ALT 31 32   ALKPHOS 86 87   BILITOTAL 0.5 0.6     Most Recent 3 Troponin's:  Recent Labs   Lab Test 07/07/20  0842   TROPI <0.015     Most Recent 3  BNP's:No lab results found.  Most Recent D-dimer:No lab results found.  Most Recent Hemoglobin A1c:  Recent Labs   Lab Test 07/07/20  1249   A1C 8.1*     Most Recent 6 glucoses:  Recent Labs   Lab Test 10/01/24  0829 10/01/24  0753 10/01/24  0216 09/30/24  2149 09/30/24  1710 09/30/24  1208   * 157* 123* 149* 132* 158*     Most Recent Urinalysis:  Recent Labs   Lab Test 07/07/20  0941   COLOR Light Yellow   APPEARANCE Clear   URINEGLC Negative   URINEBILI Negative   URINEKETONE Negative   SG 1.021   UBLD Negative   URINEPH 7.5*   PROTEIN Negative   NITRITE Negative   LEUKEST Negative   RBCU <1   WBCU 0   ,   Results for orders placed or performed during the hospital encounter of 09/25/24   US Abdomen Limited    Narrative    EXAM: US ABDOMEN LIMITED  LOCATION: [facility]  DATE: 9/25/2024    INDICATION: Pain, tenderness, lipase elevation.  COMPARISON: Ultrasound 7/8/2020.  TECHNIQUE: Limited abdominal ultrasound.    FINDINGS:    GALLBLADDER: There is slight gallbladder wall thickening measuring up to 3.4 mm. The gallbladder is otherwise normal with no stones, sludge, pericholecystic fluid, or positive Ray sign.    BILE DUCTS: No biliary dilatation. The common duct measures 4.6 mm.    LIVER: Advanced increased echogenicity of the liver parenchyma most typical for fatty infiltration. No focal mass.    RIGHT KIDNEY: No hydronephrosis.    PANCREAS: The majority of the pancreas is not well seen given overlying bowel gas.    No ascites.      Impression    IMPRESSION:  1.  Advanced fatty infiltration of the liver.    2.  Mild gallbladder wall thickening at 3.4 mm, the gallbladder is otherwise normal in appearance.    3.  Normal right kidney and bile ducts.    4.  The majority of the pancreas is not well seen given overlying bowel gas.    5.  Since 7/8/2020, there has been no significant changes.       CT Abdomen Pelvis w Contrast    Narrative    EXAM: CT ABDOMEN PELVIS W CONTRAST  LOCATION:   [facility]  DATE: 9/25/2024    INDICATION: abdominal pain, mostly upper, had pancreatitis once but lipase is only a little elevated, not clear etiology on US  COMPARISON: 7/7/2020  TECHNIQUE: CT scan of the abdomen and pelvis was performed following injection of IV contrast. Multiplanar reformats were obtained. Dose reduction techniques were used.  CONTRAST: 100 mL Isovue 370    FINDINGS:   LOWER CHEST: Chronic lingular atelectasis.    HEPATOBILIARY: Diffuse hepatic steatosis.    PANCREAS: Peripancreatic inflammatory changes involving the head and uncinate process with relative sparing of the body and tail. No fluid collections. No CT evidence of necrosis.    SPLEEN: Unremarkable    ADRENAL GLANDS: Unremarkable    KIDNEYS/BLADDER: No hydronephrosis. Normal bladder contour.    BOWEL: Stranding and mild reactive wall thickening involving the duodenum. Normal caliber appendix    LYMPH NODES: No significant retroperitoneal adenopathy.    VASCULATURE: No abdominal aortic aneurysm    PELVIC ORGANS: No free fluid    MUSCULOSKELETAL: Bilateral L5 pars defects with grade 1 anterolisthesis.      Impression    IMPRESSION:   1.  Acute interstitial edematous focal pancreatitis without fluid collections.       Discharge Medications   Current Discharge Medication List        START taking these medications    Details   pantoprazole (PROTONIX) 40 MG EC tablet Take 1 tablet (40 mg) by mouth daily.  Qty: 30 tablet, Refills: 0    Associated Diagnoses: Alcohol-induced acute pancreatitis without infection or necrosis      triamcinolone (KENALOG) 0.1 % external cream Apply topically 2 times daily. Apply to affected area  Qty: 15 g, Refills: 0    Associated Diagnoses: Contact dermatitis due to other agent, unspecified contact dermatitis type           CONTINUE these medications which have NOT CHANGED    Details   acetaminophen (TYLENOL) 500 MG tablet Take 1,000 mg by mouth 2 times daily      celecoxib (CELEBREX) 100 MG  capsule Take 100 mg by mouth 2 times daily.      cyclobenzaprine (FLEXERIL) 10 MG tablet Take 10 mg by mouth 3 times daily as needed for muscle spasms.      gabapentin (NEURONTIN) 300 MG capsule Take 1,200 mg by mouth 3 times daily      HUMALOG KWIKPEN 100 UNIT/ML soln Inject subcutaneously 3 times daily (before meals). SLIDING SCALE   <150: 0 UNITS,  150-200: 2 UNITS,   201-250: 4 UNITS,  >250: 6 UNITS.   MAX DOSE OF 20UNITS DAILY      hydrOXYzine HCl (ATARAX) 25 MG tablet Take 50 mg by mouth nightly as needed (sleep).      losartan (COZAAR) 50 MG tablet Take 50 mg by mouth daily.      metFORMIN (GLUCOPHAGE-XR) 500 MG 24 hr tablet Take 2,000 mg by mouth at bedtime.      oxyCODONE (ROXICODONE) 5 MG tablet Take 1-2 tablets (5-10 mg) by mouth every 4 hours as needed for moderate to severe pain  Qty: 20 tablet, Refills: 0    Associated Diagnoses: Postoperative state      rosuvastatin (CRESTOR) 40 MG tablet Take 40 mg by mouth daily.      tiZANidine (ZANAFLEX) 2 MG tablet Take 4-6 mg by mouth at bedtime. And left shoulder      albuterol (PROAIR HFA/PROVENTIL HFA/VENTOLIN HFA) 108 (90 Base) MCG/ACT inhaler Inhale 1-2 puffs into the lungs every 6 hours as needed for shortness of breath / dyspnea or wheezing    Comments: Pharmacy may dispense brand covered by insurance (Proair, or proventil or ventolin or generic albuterol inhaler)           STOP taking these medications       omeprazole (PRILOSEC) 20 MG DR capsule Comments:   Reason for Stopping:             Allergies   Allergies   Allergen Reactions    No Known Drug Allergy     Semaglutide(0.25 Or 0.5mg-Dos) Other (See Comments)     Caused Pancreatitis

## 2024-10-01 NOTE — PLAN OF CARE
Goal Outcome Evaluation:      Plan of Care Reviewed With: patient    Overall Patient Progress: improvingOverall Patient Progress: improving       [id number]   Calm and cooperative. PRN Flexeril 0042. Atarax 0042. Oxycodone 0206 benadryl 0206  Swelling in feet have lessened. Blisters remain on feet.   Tolerating diet  Discharge home today with wife        Problem: Adult Inpatient Plan of Care  Goal: Plan of Care Review  Description: The Plan of Care Review/Shift note should be completed every shift.  The Outcome Evaluation is a brief statement about your assessment that the patient is improving, declining, or no change.  This information will be displayed automatically on your shift  note.  Recent Flowsheet Documentation  Taken 10/1/2024 0346 by Aravind Barbosa RN  Plan of Care Reviewed With: patient  Overall Patient Progress: improving  Goal: Absence of Hospital-Acquired Illness or Injury  Intervention: Identify and Manage Fall Risk  Recent Flowsheet Documentation  Taken 10/1/2024 0100 by Aravind Barbosa RN  Safety Promotion/Fall Prevention: safety round/check completed  Intervention: Prevent Skin Injury  Recent Flowsheet Documentation  Taken 10/1/2024 0100 by Aravind Barbosa RN  Body Position: position changed independently  Skin Protection: adhesive use limited  Intervention: Prevent and Manage VTE (Venous Thromboembolism) Risk  Recent Flowsheet Documentation  Taken 10/1/2024 0100 by Aravind Barbosa RN  VTE Prevention/Management: patient refused intervention  Intervention: Prevent Infection  Recent Flowsheet Documentation  Taken 10/1/2024 0100 by Aravind Barbosa RN  Infection Prevention: hand hygiene promoted  Goal: Optimal Comfort and Wellbeing  Intervention: Monitor Pain and Promote Comfort  Recent Flowsheet Documentation  Taken 10/1/2024 0042 by Aravind Barbosa RN  Pain Management Interventions: medication (see MAR)

## 2025-02-03 ENCOUNTER — APPOINTMENT (OUTPATIENT)
Dept: ULTRASOUND IMAGING | Facility: CLINIC | Age: 45
End: 2025-02-03
Attending: EMERGENCY MEDICINE
Payer: COMMERCIAL

## 2025-02-03 ENCOUNTER — HOSPITAL ENCOUNTER (INPATIENT)
Facility: CLINIC | Age: 45
LOS: 1 days | Discharge: HOME | End: 2025-02-05
Attending: EMERGENCY MEDICINE | Admitting: HOSPITALIST
Payer: COMMERCIAL

## 2025-02-03 DIAGNOSIS — S30.22XA SCROTAL HEMATOMA: ICD-10-CM

## 2025-02-03 DIAGNOSIS — N50.89 SWELLING OF RIGHT HALF OF SCROTUM: Primary | ICD-10-CM

## 2025-02-03 LAB
ANION GAP SERPL CALCULATED.3IONS-SCNC: 12 MMOL/L (ref 7–15)
BASOPHILS # BLD AUTO: 0.1 10E3/UL (ref 0–0.2)
BASOPHILS NFR BLD AUTO: 1 %
BUN SERPL-MCNC: 9.8 MG/DL (ref 6–20)
CALCIUM SERPL-MCNC: 8.7 MG/DL (ref 8.8–10.4)
CHLORIDE SERPL-SCNC: 99 MMOL/L (ref 98–107)
CREAT SERPL-MCNC: 0.88 MG/DL (ref 0.67–1.17)
EGFRCR SERPLBLD CKD-EPI 2021: >90 ML/MIN/1.73M2
EOSINOPHIL # BLD AUTO: 0.1 10E3/UL (ref 0–0.7)
EOSINOPHIL NFR BLD AUTO: 1 %
ERYTHROCYTE [DISTWIDTH] IN BLOOD BY AUTOMATED COUNT: 13.2 % (ref 10–15)
GLUCOSE BLDC GLUCOMTR-MCNC: 168 MG/DL (ref 70–99)
GLUCOSE SERPL-MCNC: 201 MG/DL (ref 70–99)
HCO3 SERPL-SCNC: 28 MMOL/L (ref 22–29)
HCT VFR BLD AUTO: 41.2 % (ref 40–53)
HGB BLD-MCNC: 14.3 G/DL (ref 13.3–17.7)
IMM GRANULOCYTES # BLD: 0.1 10E3/UL
IMM GRANULOCYTES NFR BLD: 1 %
LYMPHOCYTES # BLD AUTO: 2.4 10E3/UL (ref 0.8–5.3)
LYMPHOCYTES NFR BLD AUTO: 31 %
MCH RBC QN AUTO: 31.6 PG (ref 26.5–33)
MCHC RBC AUTO-ENTMCNC: 34.7 G/DL (ref 31.5–36.5)
MCV RBC AUTO: 91 FL (ref 78–100)
MONOCYTES # BLD AUTO: 0.4 10E3/UL (ref 0–1.3)
MONOCYTES NFR BLD AUTO: 5 %
NEUTROPHILS # BLD AUTO: 4.8 10E3/UL (ref 1.6–8.3)
NEUTROPHILS NFR BLD AUTO: 62 %
NRBC # BLD AUTO: 0 10E3/UL
NRBC BLD AUTO-RTO: 0 /100
PLATELET # BLD AUTO: 221 10E3/UL (ref 150–450)
POTASSIUM SERPL-SCNC: 5 MMOL/L (ref 3.4–5.3)
RBC # BLD AUTO: 4.53 10E6/UL (ref 4.4–5.9)
SODIUM SERPL-SCNC: 139 MMOL/L (ref 135–145)
WBC # BLD AUTO: 7.8 10E3/UL (ref 4–11)

## 2025-02-03 PROCEDURE — G0378 HOSPITAL OBSERVATION PER HR: HCPCS

## 2025-02-03 PROCEDURE — 93976 VASCULAR STUDY: CPT

## 2025-02-03 PROCEDURE — 82962 GLUCOSE BLOOD TEST: CPT

## 2025-02-03 PROCEDURE — 250N000013 HC RX MED GY IP 250 OP 250 PS 637: Performed by: EMERGENCY MEDICINE

## 2025-02-03 PROCEDURE — 36415 COLL VENOUS BLD VENIPUNCTURE: CPT | Performed by: EMERGENCY MEDICINE

## 2025-02-03 PROCEDURE — 80048 BASIC METABOLIC PNL TOTAL CA: CPT | Performed by: EMERGENCY MEDICINE

## 2025-02-03 PROCEDURE — 96365 THER/PROPH/DIAG IV INF INIT: CPT

## 2025-02-03 PROCEDURE — 99285 EMERGENCY DEPT VISIT HI MDM: CPT | Mod: 25

## 2025-02-03 PROCEDURE — 96375 TX/PRO/DX INJ NEW DRUG ADDON: CPT

## 2025-02-03 PROCEDURE — 250N000011 HC RX IP 250 OP 636: Performed by: HOSPITALIST

## 2025-02-03 PROCEDURE — 250N000012 HC RX MED GY IP 250 OP 636 PS 637: Performed by: HOSPITALIST

## 2025-02-03 PROCEDURE — 85025 COMPLETE CBC W/AUTO DIFF WBC: CPT | Performed by: EMERGENCY MEDICINE

## 2025-02-03 PROCEDURE — 85041 AUTOMATED RBC COUNT: CPT | Performed by: EMERGENCY MEDICINE

## 2025-02-03 PROCEDURE — 258N000003 HC RX IP 258 OP 636: Performed by: HOSPITALIST

## 2025-02-03 PROCEDURE — 85014 HEMATOCRIT: CPT | Performed by: EMERGENCY MEDICINE

## 2025-02-03 PROCEDURE — 99222 1ST HOSP IP/OBS MODERATE 55: CPT | Performed by: HOSPITALIST

## 2025-02-03 RX ORDER — PROCHLORPERAZINE MALEATE 5 MG/1
10 TABLET ORAL EVERY 6 HOURS PRN
Status: DISCONTINUED | OUTPATIENT
Start: 2025-02-03 | End: 2025-02-05 | Stop reason: HOSPADM

## 2025-02-03 RX ORDER — ACETAMINOPHEN 325 MG/1
650 TABLET ORAL EVERY 4 HOURS PRN
Status: DISCONTINUED | OUTPATIENT
Start: 2025-02-03 | End: 2025-02-05 | Stop reason: HOSPADM

## 2025-02-03 RX ORDER — PIPERACILLIN SODIUM, TAZOBACTAM SODIUM 3; .375 G/15ML; G/15ML
3.38 INJECTION, POWDER, LYOPHILIZED, FOR SOLUTION INTRAVENOUS EVERY 6 HOURS
Status: DISCONTINUED | OUTPATIENT
Start: 2025-02-03 | End: 2025-02-05 | Stop reason: HOSPADM

## 2025-02-03 RX ORDER — ACETAMINOPHEN 650 MG/1
650 SUPPOSITORY RECTAL EVERY 4 HOURS PRN
Status: DISCONTINUED | OUTPATIENT
Start: 2025-02-03 | End: 2025-02-05 | Stop reason: HOSPADM

## 2025-02-03 RX ORDER — HYDROMORPHONE HCL IN WATER/PF 6 MG/30 ML
0.4 PATIENT CONTROLLED ANALGESIA SYRINGE INTRAVENOUS
Status: DISCONTINUED | OUTPATIENT
Start: 2025-02-03 | End: 2025-02-05

## 2025-02-03 RX ORDER — ONDANSETRON 2 MG/ML
4 INJECTION INTRAMUSCULAR; INTRAVENOUS EVERY 6 HOURS PRN
Status: DISCONTINUED | OUTPATIENT
Start: 2025-02-03 | End: 2025-02-05 | Stop reason: HOSPADM

## 2025-02-03 RX ORDER — ONDANSETRON 4 MG/1
4 TABLET, ORALLY DISINTEGRATING ORAL EVERY 6 HOURS PRN
Status: DISCONTINUED | OUTPATIENT
Start: 2025-02-03 | End: 2025-02-05 | Stop reason: HOSPADM

## 2025-02-03 RX ORDER — HYDROMORPHONE HYDROCHLORIDE 2 MG/1
2 TABLET ORAL
Status: DISCONTINUED | OUTPATIENT
Start: 2025-02-03 | End: 2025-02-05 | Stop reason: ALTCHOICE

## 2025-02-03 RX ORDER — HYDROMORPHONE HYDROCHLORIDE 2 MG/1
2 TABLET ORAL ONCE
Status: COMPLETED | OUTPATIENT
Start: 2025-02-03 | End: 2025-02-03

## 2025-02-03 RX ORDER — NICOTINE POLACRILEX 4 MG
15-30 LOZENGE BUCCAL
Status: DISCONTINUED | OUTPATIENT
Start: 2025-02-03 | End: 2025-02-05 | Stop reason: HOSPADM

## 2025-02-03 RX ORDER — ACETAMINOPHEN 325 MG/1
650 TABLET ORAL ONCE
Status: COMPLETED | OUTPATIENT
Start: 2025-02-03 | End: 2025-02-03

## 2025-02-03 RX ORDER — OXYCODONE HYDROCHLORIDE 5 MG/1
5 TABLET ORAL EVERY 4 HOURS PRN
Status: DISCONTINUED | OUTPATIENT
Start: 2025-02-03 | End: 2025-02-05

## 2025-02-03 RX ORDER — AMOXICILLIN 250 MG
1 CAPSULE ORAL 2 TIMES DAILY PRN
Status: DISCONTINUED | OUTPATIENT
Start: 2025-02-03 | End: 2025-02-05 | Stop reason: HOSPADM

## 2025-02-03 RX ORDER — DEXTROSE MONOHYDRATE 25 G/50ML
25-50 INJECTION, SOLUTION INTRAVENOUS
Status: DISCONTINUED | OUTPATIENT
Start: 2025-02-03 | End: 2025-02-05 | Stop reason: HOSPADM

## 2025-02-03 RX ORDER — SODIUM CHLORIDE 9 MG/ML
INJECTION, SOLUTION INTRAVENOUS CONTINUOUS
Status: DISCONTINUED | OUTPATIENT
Start: 2025-02-03 | End: 2025-02-03

## 2025-02-03 RX ORDER — HYDROMORPHONE HCL IN WATER/PF 6 MG/30 ML
0.2 PATIENT CONTROLLED ANALGESIA SYRINGE INTRAVENOUS
Status: DISCONTINUED | OUTPATIENT
Start: 2025-02-03 | End: 2025-02-05

## 2025-02-03 RX ORDER — AMOXICILLIN 250 MG
2 CAPSULE ORAL 2 TIMES DAILY PRN
Status: DISCONTINUED | OUTPATIENT
Start: 2025-02-03 | End: 2025-02-05 | Stop reason: HOSPADM

## 2025-02-03 RX ORDER — SODIUM CHLORIDE 9 MG/ML
INJECTION, SOLUTION INTRAVENOUS CONTINUOUS
Status: DISCONTINUED | OUTPATIENT
Start: 2025-02-03 | End: 2025-02-05

## 2025-02-03 RX ADMIN — HYDROMORPHONE HYDROCHLORIDE 2 MG: 2 TABLET ORAL at 22:03

## 2025-02-03 RX ADMIN — VANCOMYCIN HYDROCHLORIDE 2500 MG: 5 INJECTION, POWDER, LYOPHILIZED, FOR SOLUTION INTRAVENOUS at 23:39

## 2025-02-03 RX ADMIN — SODIUM CHLORIDE: 900 INJECTION, SOLUTION INTRAVENOUS at 23:39

## 2025-02-03 RX ADMIN — ACETAMINOPHEN 650 MG: 325 TABLET, FILM COATED ORAL at 16:07

## 2025-02-03 RX ADMIN — HYDROMORPHONE HYDROCHLORIDE 2 MG: 2 TABLET ORAL at 16:07

## 2025-02-03 RX ADMIN — HYDROMORPHONE HYDROCHLORIDE 2 MG: 2 TABLET ORAL at 19:48

## 2025-02-03 RX ADMIN — AMOXICILLIN AND CLAVULANATE POTASSIUM 1 TABLET: 875; 125 TABLET, FILM COATED ORAL at 22:05

## 2025-02-03 RX ADMIN — HYDROMORPHONE HYDROCHLORIDE 0.4 MG: 0.2 INJECTION, SOLUTION INTRAMUSCULAR; INTRAVENOUS; SUBCUTANEOUS at 23:44

## 2025-02-03 RX ADMIN — INSULIN ASPART 1 UNITS: 100 INJECTION, SOLUTION INTRAVENOUS; SUBCUTANEOUS at 23:41

## 2025-02-03 ASSESSMENT — ACTIVITIES OF DAILY LIVING (ADL)
ADLS_ACUITY_SCORE: 48

## 2025-02-03 ASSESSMENT — COLUMBIA-SUICIDE SEVERITY RATING SCALE - C-SSRS
6. HAVE YOU EVER DONE ANYTHING, STARTED TO DO ANYTHING, OR PREPARED TO DO ANYTHING TO END YOUR LIFE?: NO
2. HAVE YOU ACTUALLY HAD ANY THOUGHTS OF KILLING YOURSELF IN THE PAST MONTH?: NO
1. IN THE PAST MONTH, HAVE YOU WISHED YOU WERE DEAD OR WISHED YOU COULD GO TO SLEEP AND NOT WAKE UP?: NO

## 2025-02-03 NOTE — ED TRIAGE NOTES
Pt had R testicle removed 01/21. Pt had follow up on Wednesday and everything was fine. Since Wednesday per wife scrotum has had a lump that has increased in size. Pt states the scrotum has increased in redness. No fevers.

## 2025-02-04 ENCOUNTER — ANESTHESIA EVENT (OUTPATIENT)
Dept: SURGERY | Facility: CLINIC | Age: 45
End: 2025-02-04
Payer: COMMERCIAL

## 2025-02-04 ENCOUNTER — ANESTHESIA (OUTPATIENT)
Dept: SURGERY | Facility: CLINIC | Age: 45
End: 2025-02-04
Payer: COMMERCIAL

## 2025-02-04 ENCOUNTER — APPOINTMENT (OUTPATIENT)
Dept: CT IMAGING | Facility: CLINIC | Age: 45
End: 2025-02-04
Attending: PHYSICIAN ASSISTANT
Payer: COMMERCIAL

## 2025-02-04 LAB
ABO + RH BLD: NORMAL
ANION GAP SERPL CALCULATED.3IONS-SCNC: 12 MMOL/L (ref 7–15)
BLD GP AB SCN SERPL QL: NEGATIVE
BUN SERPL-MCNC: 9.2 MG/DL (ref 6–20)
CALCIUM SERPL-MCNC: 8.3 MG/DL (ref 8.8–10.4)
CHLORIDE SERPL-SCNC: 97 MMOL/L (ref 98–107)
CREAT SERPL-MCNC: 0.91 MG/DL (ref 0.67–1.17)
EGFRCR SERPLBLD CKD-EPI 2021: >90 ML/MIN/1.73M2
ERYTHROCYTE [DISTWIDTH] IN BLOOD BY AUTOMATED COUNT: 12.9 % (ref 10–15)
GLUCOSE BLDC GLUCOMTR-MCNC: 149 MG/DL (ref 70–99)
GLUCOSE BLDC GLUCOMTR-MCNC: 163 MG/DL (ref 70–99)
GLUCOSE BLDC GLUCOMTR-MCNC: 165 MG/DL (ref 70–99)
GLUCOSE BLDC GLUCOMTR-MCNC: 171 MG/DL (ref 70–99)
GLUCOSE BLDC GLUCOMTR-MCNC: 181 MG/DL (ref 70–99)
GLUCOSE BLDC GLUCOMTR-MCNC: 273 MG/DL (ref 70–99)
GLUCOSE SERPL-MCNC: 154 MG/DL (ref 70–99)
HCO3 SERPL-SCNC: 27 MMOL/L (ref 22–29)
HCT VFR BLD AUTO: 40.5 % (ref 40–53)
HGB BLD-MCNC: 14.1 G/DL (ref 13.3–17.7)
LIPASE SERPL-CCNC: 74 U/L (ref 13–60)
MCH RBC QN AUTO: 31.7 PG (ref 26.5–33)
MCHC RBC AUTO-ENTMCNC: 34.8 G/DL (ref 31.5–36.5)
MCV RBC AUTO: 91 FL (ref 78–100)
PLATELET # BLD AUTO: 195 10E3/UL (ref 150–450)
POTASSIUM SERPL-SCNC: 4.3 MMOL/L (ref 3.4–5.3)
RBC # BLD AUTO: 4.45 10E6/UL (ref 4.4–5.9)
SODIUM SERPL-SCNC: 136 MMOL/L (ref 135–145)
SPECIMEN EXP DATE BLD: NORMAL
WBC # BLD AUTO: 9.1 10E3/UL (ref 4–11)

## 2025-02-04 PROCEDURE — 0V95XZZ DRAINAGE OF SCROTUM, EXTERNAL APPROACH: ICD-10-PCS | Performed by: STUDENT IN AN ORGANIZED HEALTH CARE EDUCATION/TRAINING PROGRAM

## 2025-02-04 PROCEDURE — 85014 HEMATOCRIT: CPT | Performed by: HOSPITALIST

## 2025-02-04 PROCEDURE — G0378 HOSPITAL OBSERVATION PER HR: HCPCS

## 2025-02-04 PROCEDURE — 999N000141 HC STATISTIC PRE-PROCEDURE NURSING ASSESSMENT: Performed by: STUDENT IN AN ORGANIZED HEALTH CARE EDUCATION/TRAINING PROGRAM

## 2025-02-04 PROCEDURE — 258N000003 HC RX IP 258 OP 636: Performed by: NURSE ANESTHETIST, CERTIFIED REGISTERED

## 2025-02-04 PROCEDURE — 250N000011 HC RX IP 250 OP 636: Performed by: ANESTHESIOLOGY

## 2025-02-04 PROCEDURE — 250N000011 HC RX IP 250 OP 636: Performed by: NURSE ANESTHETIST, CERTIFIED REGISTERED

## 2025-02-04 PROCEDURE — 250N000013 HC RX MED GY IP 250 OP 250 PS 637: Performed by: EMERGENCY MEDICINE

## 2025-02-04 PROCEDURE — 250N000013 HC RX MED GY IP 250 OP 250 PS 637: Performed by: STUDENT IN AN ORGANIZED HEALTH CARE EDUCATION/TRAINING PROGRAM

## 2025-02-04 PROCEDURE — 82565 ASSAY OF CREATININE: CPT | Performed by: HOSPITALIST

## 2025-02-04 PROCEDURE — 120N000001 HC R&B MED SURG/OB

## 2025-02-04 PROCEDURE — 83690 ASSAY OF LIPASE: CPT | Performed by: HOSPITALIST

## 2025-02-04 PROCEDURE — 250N000009 HC RX 250: Performed by: PHYSICIAN ASSISTANT

## 2025-02-04 PROCEDURE — 272N000001 HC OR GENERAL SUPPLY STERILE: Performed by: STUDENT IN AN ORGANIZED HEALTH CARE EDUCATION/TRAINING PROGRAM

## 2025-02-04 PROCEDURE — 250N000009 HC RX 250: Performed by: NURSE ANESTHETIST, CERTIFIED REGISTERED

## 2025-02-04 PROCEDURE — 258N000003 HC RX IP 258 OP 636: Performed by: ANESTHESIOLOGY

## 2025-02-04 PROCEDURE — 250N000011 HC RX IP 250 OP 636: Performed by: HOSPITALIST

## 2025-02-04 PROCEDURE — 250N000009 HC RX 250: Performed by: ANESTHESIOLOGY

## 2025-02-04 PROCEDURE — 96376 TX/PRO/DX INJ SAME DRUG ADON: CPT

## 2025-02-04 PROCEDURE — 87070 CULTURE OTHR SPECIMN AEROBIC: CPT | Performed by: STUDENT IN AN ORGANIZED HEALTH CARE EDUCATION/TRAINING PROGRAM

## 2025-02-04 PROCEDURE — 74177 CT ABD & PELVIS W/CONTRAST: CPT

## 2025-02-04 PROCEDURE — 710N000009 HC RECOVERY PHASE 1, LEVEL 1, PER MIN: Performed by: STUDENT IN AN ORGANIZED HEALTH CARE EDUCATION/TRAINING PROGRAM

## 2025-02-04 PROCEDURE — 80048 BASIC METABOLIC PNL TOTAL CA: CPT | Performed by: HOSPITALIST

## 2025-02-04 PROCEDURE — 96375 TX/PRO/DX INJ NEW DRUG ADDON: CPT

## 2025-02-04 PROCEDURE — 87075 CULTR BACTERIA EXCEPT BLOOD: CPT | Performed by: STUDENT IN AN ORGANIZED HEALTH CARE EDUCATION/TRAINING PROGRAM

## 2025-02-04 PROCEDURE — 36415 COLL VENOUS BLD VENIPUNCTURE: CPT | Performed by: HOSPITALIST

## 2025-02-04 PROCEDURE — 85048 AUTOMATED LEUKOCYTE COUNT: CPT | Performed by: HOSPITALIST

## 2025-02-04 PROCEDURE — 250N000011 HC RX IP 250 OP 636: Performed by: PHYSICIAN ASSISTANT

## 2025-02-04 PROCEDURE — 55100 DRAINAGE OF SCROTUM ABSCESS: CPT | Performed by: STUDENT IN AN ORGANIZED HEALTH CARE EDUCATION/TRAINING PROGRAM

## 2025-02-04 PROCEDURE — 250N000011 HC RX IP 250 OP 636: Performed by: STUDENT IN AN ORGANIZED HEALTH CARE EDUCATION/TRAINING PROGRAM

## 2025-02-04 PROCEDURE — 258N000003 HC RX IP 258 OP 636: Performed by: HOSPITALIST

## 2025-02-04 PROCEDURE — 250N000009 HC RX 250: Performed by: STUDENT IN AN ORGANIZED HEALTH CARE EDUCATION/TRAINING PROGRAM

## 2025-02-04 PROCEDURE — 82962 GLUCOSE BLOOD TEST: CPT

## 2025-02-04 PROCEDURE — 99232 SBSQ HOSP IP/OBS MODERATE 35: CPT | Performed by: HOSPITALIST

## 2025-02-04 PROCEDURE — 250N000013 HC RX MED GY IP 250 OP 250 PS 637: Performed by: HOSPITALIST

## 2025-02-04 PROCEDURE — 99222 1ST HOSP IP/OBS MODERATE 55: CPT | Mod: 25 | Performed by: STUDENT IN AN ORGANIZED HEALTH CARE EDUCATION/TRAINING PROGRAM

## 2025-02-04 PROCEDURE — 86900 BLOOD TYPING SEROLOGIC ABO: CPT | Performed by: HOSPITALIST

## 2025-02-04 PROCEDURE — 96366 THER/PROPH/DIAG IV INF ADDON: CPT

## 2025-02-04 PROCEDURE — 360N000075 HC SURGERY LEVEL 2, PER MIN: Performed by: STUDENT IN AN ORGANIZED HEALTH CARE EDUCATION/TRAINING PROGRAM

## 2025-02-04 PROCEDURE — 370N000017 HC ANESTHESIA TECHNICAL FEE, PER MIN: Performed by: STUDENT IN AN ORGANIZED HEALTH CARE EDUCATION/TRAINING PROGRAM

## 2025-02-04 RX ORDER — CEFAZOLIN SODIUM IN 0.9 % NACL 3 G/100 ML
3 INTRAVENOUS SOLUTION, PIGGYBACK (ML) INTRAVENOUS SEE ADMIN INSTRUCTIONS
Status: DISCONTINUED | OUTPATIENT
Start: 2025-02-04 | End: 2025-02-04 | Stop reason: HOSPADM

## 2025-02-04 RX ORDER — CEFAZOLIN SODIUM/WATER 3 G/30 ML
3 SYRINGE (ML) INTRAVENOUS
Status: COMPLETED | OUTPATIENT
Start: 2025-02-04 | End: 2025-02-04

## 2025-02-04 RX ORDER — PANTOPRAZOLE SODIUM 40 MG/1
40 TABLET, DELAYED RELEASE ORAL EVERY EVENING
Status: DISCONTINUED | OUTPATIENT
Start: 2025-02-04 | End: 2025-02-05 | Stop reason: HOSPADM

## 2025-02-04 RX ORDER — LABETALOL HYDROCHLORIDE 5 MG/ML
10 INJECTION, SOLUTION INTRAVENOUS
Status: COMPLETED | OUTPATIENT
Start: 2025-02-04 | End: 2025-02-04

## 2025-02-04 RX ORDER — IPRATROPIUM BROMIDE AND ALBUTEROL SULFATE 2.5; .5 MG/3ML; MG/3ML
3 SOLUTION RESPIRATORY (INHALATION) ONCE
Status: COMPLETED | OUTPATIENT
Start: 2025-02-04 | End: 2025-02-04

## 2025-02-04 RX ORDER — ONDANSETRON 2 MG/ML
4 INJECTION INTRAMUSCULAR; INTRAVENOUS EVERY 30 MIN PRN
Status: DISCONTINUED | OUTPATIENT
Start: 2025-02-04 | End: 2025-02-04 | Stop reason: HOSPADM

## 2025-02-04 RX ORDER — NALOXONE HYDROCHLORIDE 0.4 MG/ML
0.1 INJECTION, SOLUTION INTRAMUSCULAR; INTRAVENOUS; SUBCUTANEOUS
Status: DISCONTINUED | OUTPATIENT
Start: 2025-02-04 | End: 2025-02-04 | Stop reason: HOSPADM

## 2025-02-04 RX ORDER — PROPOFOL 10 MG/ML
INJECTION, EMULSION INTRAVENOUS CONTINUOUS PRN
Status: DISCONTINUED | OUTPATIENT
Start: 2025-02-04 | End: 2025-02-04

## 2025-02-04 RX ORDER — HYDRALAZINE HYDROCHLORIDE 20 MG/ML
10 INJECTION INTRAMUSCULAR; INTRAVENOUS EVERY 4 HOURS PRN
Status: DISCONTINUED | OUTPATIENT
Start: 2025-02-04 | End: 2025-02-05 | Stop reason: HOSPADM

## 2025-02-04 RX ORDER — BUPIVACAINE HYDROCHLORIDE 2.5 MG/ML
INJECTION, SOLUTION INFILTRATION; PERINEURAL PRN
Status: DISCONTINUED | OUTPATIENT
Start: 2025-02-04 | End: 2025-02-04 | Stop reason: HOSPADM

## 2025-02-04 RX ORDER — DEXAMETHASONE SODIUM PHOSPHATE 4 MG/ML
INJECTION, SOLUTION INTRA-ARTICULAR; INTRALESIONAL; INTRAMUSCULAR; INTRAVENOUS; SOFT TISSUE PRN
Status: DISCONTINUED | OUTPATIENT
Start: 2025-02-04 | End: 2025-02-04

## 2025-02-04 RX ORDER — MAGNESIUM HYDROXIDE 1200 MG/15ML
LIQUID ORAL PRN
Status: DISCONTINUED | OUTPATIENT
Start: 2025-02-04 | End: 2025-02-04 | Stop reason: HOSPADM

## 2025-02-04 RX ORDER — FENTANYL CITRATE 50 UG/ML
INJECTION, SOLUTION INTRAMUSCULAR; INTRAVENOUS PRN
Status: DISCONTINUED | OUTPATIENT
Start: 2025-02-04 | End: 2025-02-04

## 2025-02-04 RX ORDER — LOSARTAN POTASSIUM 50 MG/1
50 TABLET ORAL EVERY EVENING
Status: DISCONTINUED | OUTPATIENT
Start: 2025-02-04 | End: 2025-02-05 | Stop reason: HOSPADM

## 2025-02-04 RX ORDER — IOPAMIDOL 755 MG/ML
500 INJECTION, SOLUTION INTRAVASCULAR ONCE
Status: COMPLETED | OUTPATIENT
Start: 2025-02-04 | End: 2025-02-04

## 2025-02-04 RX ORDER — ACETAMINOPHEN 325 MG/1
975 TABLET ORAL ONCE
Status: COMPLETED | OUTPATIENT
Start: 2025-02-04 | End: 2025-02-04

## 2025-02-04 RX ORDER — SODIUM CHLORIDE, SODIUM LACTATE, POTASSIUM CHLORIDE, CALCIUM CHLORIDE 600; 310; 30; 20 MG/100ML; MG/100ML; MG/100ML; MG/100ML
INJECTION, SOLUTION INTRAVENOUS CONTINUOUS
Status: DISCONTINUED | OUTPATIENT
Start: 2025-02-04 | End: 2025-02-04 | Stop reason: HOSPADM

## 2025-02-04 RX ORDER — FENTANYL CITRATE 50 UG/ML
50 INJECTION, SOLUTION INTRAMUSCULAR; INTRAVENOUS EVERY 5 MIN PRN
Status: DISCONTINUED | OUTPATIENT
Start: 2025-02-04 | End: 2025-02-04 | Stop reason: HOSPADM

## 2025-02-04 RX ORDER — DEXAMETHASONE SODIUM PHOSPHATE 4 MG/ML
4 INJECTION, SOLUTION INTRA-ARTICULAR; INTRALESIONAL; INTRAMUSCULAR; INTRAVENOUS; SOFT TISSUE
Status: DISCONTINUED | OUTPATIENT
Start: 2025-02-04 | End: 2025-02-04 | Stop reason: HOSPADM

## 2025-02-04 RX ORDER — HYDROMORPHONE HCL IN WATER/PF 6 MG/30 ML
0.4 PATIENT CONTROLLED ANALGESIA SYRINGE INTRAVENOUS EVERY 5 MIN PRN
Status: DISCONTINUED | OUTPATIENT
Start: 2025-02-04 | End: 2025-02-04 | Stop reason: HOSPADM

## 2025-02-04 RX ORDER — HYDROMORPHONE HCL IN WATER/PF 6 MG/30 ML
0.2 PATIENT CONTROLLED ANALGESIA SYRINGE INTRAVENOUS EVERY 5 MIN PRN
Status: DISCONTINUED | OUTPATIENT
Start: 2025-02-04 | End: 2025-02-04 | Stop reason: HOSPADM

## 2025-02-04 RX ORDER — CYCLOBENZAPRINE HCL 10 MG
10 TABLET ORAL EVERY 8 HOURS PRN
Status: DISCONTINUED | OUTPATIENT
Start: 2025-02-04 | End: 2025-02-05 | Stop reason: HOSPADM

## 2025-02-04 RX ORDER — FENTANYL CITRATE 50 UG/ML
25 INJECTION, SOLUTION INTRAMUSCULAR; INTRAVENOUS EVERY 5 MIN PRN
Status: DISCONTINUED | OUTPATIENT
Start: 2025-02-04 | End: 2025-02-04 | Stop reason: HOSPADM

## 2025-02-04 RX ORDER — CYCLOBENZAPRINE HCL 10 MG
10 TABLET ORAL 3 TIMES DAILY PRN
Status: DISCONTINUED | OUTPATIENT
Start: 2025-02-04 | End: 2025-02-04

## 2025-02-04 RX ORDER — OXYCODONE HYDROCHLORIDE 5 MG/1
5 TABLET ORAL
Status: DISCONTINUED | OUTPATIENT
Start: 2025-02-04 | End: 2025-02-04 | Stop reason: HOSPADM

## 2025-02-04 RX ORDER — ACETAMINOPHEN 325 MG/1
975 TABLET ORAL
Status: DISCONTINUED | OUTPATIENT
Start: 2025-02-04 | End: 2025-02-04 | Stop reason: HOSPADM

## 2025-02-04 RX ORDER — ONDANSETRON 4 MG/1
4 TABLET, ORALLY DISINTEGRATING ORAL EVERY 30 MIN PRN
Status: DISCONTINUED | OUTPATIENT
Start: 2025-02-04 | End: 2025-02-04 | Stop reason: HOSPADM

## 2025-02-04 RX ORDER — LIDOCAINE HYDROCHLORIDE 20 MG/ML
INJECTION, SOLUTION INFILTRATION; PERINEURAL PRN
Status: DISCONTINUED | OUTPATIENT
Start: 2025-02-04 | End: 2025-02-04

## 2025-02-04 RX ORDER — ACETAMINOPHEN 650 MG/1
650 SUPPOSITORY RECTAL ONCE
Status: COMPLETED | OUTPATIENT
Start: 2025-02-04 | End: 2025-02-04

## 2025-02-04 RX ORDER — KETAMINE HYDROCHLORIDE 10 MG/ML
INJECTION INTRAMUSCULAR; INTRAVENOUS PRN
Status: DISCONTINUED | OUTPATIENT
Start: 2025-02-04 | End: 2025-02-04

## 2025-02-04 RX ORDER — HYDROXYZINE HYDROCHLORIDE 50 MG/1
50 TABLET, FILM COATED ORAL AT BEDTIME
Status: DISCONTINUED | OUTPATIENT
Start: 2025-02-04 | End: 2025-02-05 | Stop reason: HOSPADM

## 2025-02-04 RX ORDER — PROPOFOL 10 MG/ML
INJECTION, EMULSION INTRAVENOUS PRN
Status: DISCONTINUED | OUTPATIENT
Start: 2025-02-04 | End: 2025-02-04

## 2025-02-04 RX ORDER — CEFAZOLIN SODIUM IN 0.9 % NACL 3 G/100 ML
3 INTRAVENOUS SOLUTION, PIGGYBACK (ML) INTRAVENOUS
Status: DISCONTINUED | OUTPATIENT
Start: 2025-02-04 | End: 2025-02-04

## 2025-02-04 RX ORDER — BACITRACIN ZINC 500 [USP'U]/G
OINTMENT TOPICAL PRN
Status: DISCONTINUED | OUTPATIENT
Start: 2025-02-04 | End: 2025-02-04 | Stop reason: HOSPADM

## 2025-02-04 RX ORDER — GABAPENTIN 400 MG/1
1200 CAPSULE ORAL 3 TIMES DAILY
Status: DISCONTINUED | OUTPATIENT
Start: 2025-02-04 | End: 2025-02-05 | Stop reason: HOSPADM

## 2025-02-04 RX ORDER — LIDOCAINE 40 MG/G
CREAM TOPICAL
Status: DISCONTINUED | OUTPATIENT
Start: 2025-02-04 | End: 2025-02-04 | Stop reason: HOSPADM

## 2025-02-04 RX ADMIN — FENTANYL CITRATE 25 MCG: 50 INJECTION INTRAMUSCULAR; INTRAVENOUS at 16:48

## 2025-02-04 RX ADMIN — HYDROMORPHONE HYDROCHLORIDE 0.4 MG: 0.2 INJECTION, SOLUTION INTRAMUSCULAR; INTRAVENOUS; SUBCUTANEOUS at 07:27

## 2025-02-04 RX ADMIN — PROPOFOL 150 MCG/KG/MIN: 10 INJECTION, EMULSION INTRAVENOUS at 16:45

## 2025-02-04 RX ADMIN — VANCOMYCIN HYDROCHLORIDE 1500 MG: 10 INJECTION, POWDER, LYOPHILIZED, FOR SOLUTION INTRAVENOUS at 23:27

## 2025-02-04 RX ADMIN — Medication 10 MG: at 17:32

## 2025-02-04 RX ADMIN — HYDROMORPHONE HYDROCHLORIDE 0.4 MG: 0.2 INJECTION, SOLUTION INTRAMUSCULAR; INTRAVENOUS; SUBCUTANEOUS at 18:58

## 2025-02-04 RX ADMIN — HYDROMORPHONE HYDROCHLORIDE 0.4 MG: 0.2 INJECTION, SOLUTION INTRAMUSCULAR; INTRAVENOUS; SUBCUTANEOUS at 14:34

## 2025-02-04 RX ADMIN — CYCLOBENZAPRINE 10 MG: 10 TABLET, FILM COATED ORAL at 05:33

## 2025-02-04 RX ADMIN — Medication 20 MG: at 17:30

## 2025-02-04 RX ADMIN — HYDROMORPHONE HYDROCHLORIDE 0.4 MG: 0.2 INJECTION, SOLUTION INTRAMUSCULAR; INTRAVENOUS; SUBCUTANEOUS at 09:58

## 2025-02-04 RX ADMIN — INSULIN ASPART 1 UNITS: 100 INJECTION, SOLUTION INTRAVENOUS; SUBCUTANEOUS at 02:17

## 2025-02-04 RX ADMIN — FENTANYL CITRATE 50 MCG: 50 INJECTION, SOLUTION INTRAMUSCULAR; INTRAVENOUS at 18:07

## 2025-02-04 RX ADMIN — Medication 10 MG: at 17:24

## 2025-02-04 RX ADMIN — Medication 3 G: at 16:35

## 2025-02-04 RX ADMIN — IOPAMIDOL 100 ML: 755 INJECTION, SOLUTION INTRAVENOUS at 09:43

## 2025-02-04 RX ADMIN — OXYCODONE HYDROCHLORIDE 5 MG: 5 TABLET ORAL at 08:37

## 2025-02-04 RX ADMIN — HYDROMORPHONE HYDROCHLORIDE 0.4 MG: 0.2 INJECTION, SOLUTION INTRAMUSCULAR; INTRAVENOUS; SUBCUTANEOUS at 04:49

## 2025-02-04 RX ADMIN — PIPERACILLIN AND TAZOBACTAM 3.38 G: 3; .375 INJECTION, POWDER, FOR SOLUTION INTRAVENOUS at 05:33

## 2025-02-04 RX ADMIN — HYDROMORPHONE HYDROCHLORIDE 0.4 MG: 0.2 INJECTION, SOLUTION INTRAMUSCULAR; INTRAVENOUS; SUBCUTANEOUS at 12:06

## 2025-02-04 RX ADMIN — FENTANYL CITRATE 25 MCG: 50 INJECTION INTRAMUSCULAR; INTRAVENOUS at 16:59

## 2025-02-04 RX ADMIN — HYDROMORPHONE HYDROCHLORIDE 0.4 MG: 0.2 INJECTION, SOLUTION INTRAMUSCULAR; INTRAVENOUS; SUBCUTANEOUS at 19:36

## 2025-02-04 RX ADMIN — LOSARTAN POTASSIUM 50 MG: 50 TABLET, FILM COATED ORAL at 20:34

## 2025-02-04 RX ADMIN — ACETAMINOPHEN 975 MG: 325 TABLET, FILM COATED ORAL at 15:12

## 2025-02-04 RX ADMIN — ONDANSETRON 4 MG: 2 INJECTION INTRAMUSCULAR; INTRAVENOUS at 17:00

## 2025-02-04 RX ADMIN — PHENYLEPHRINE HYDROCHLORIDE 50 MCG: 10 INJECTION INTRAVENOUS at 17:07

## 2025-02-04 RX ADMIN — INSULIN ASPART 1 UNITS: 100 INJECTION, SOLUTION INTRAVENOUS; SUBCUTANEOUS at 11:47

## 2025-02-04 RX ADMIN — PIPERACILLIN AND TAZOBACTAM 3.38 G: 3; .375 INJECTION, POWDER, FOR SOLUTION INTRAVENOUS at 02:17

## 2025-02-04 RX ADMIN — PROPOFOL 200 MG: 10 INJECTION, EMULSION INTRAVENOUS at 16:40

## 2025-02-04 RX ADMIN — Medication 10 MG: at 17:22

## 2025-02-04 RX ADMIN — SODIUM CHLORIDE: 900 INJECTION, SOLUTION INTRAVENOUS at 11:47

## 2025-02-04 RX ADMIN — FENTANYL CITRATE 25 MCG: 50 INJECTION INTRAMUSCULAR; INTRAVENOUS at 16:49

## 2025-02-04 RX ADMIN — FENTANYL CITRATE 50 MCG: 50 INJECTION, SOLUTION INTRAMUSCULAR; INTRAVENOUS at 18:20

## 2025-02-04 RX ADMIN — DEXAMETHASONE SODIUM PHOSPHATE 8 MG: 4 INJECTION, SOLUTION INTRA-ARTICULAR; INTRALESIONAL; INTRAMUSCULAR; INTRAVENOUS; SOFT TISSUE at 16:40

## 2025-02-04 RX ADMIN — PIPERACILLIN AND TAZOBACTAM 3.38 G: 3; .375 INJECTION, POWDER, FOR SOLUTION INTRAVENOUS at 11:47

## 2025-02-04 RX ADMIN — HYDROMORPHONE HYDROCHLORIDE 0.4 MG: 0.2 INJECTION, SOLUTION INTRAMUSCULAR; INTRAVENOUS; SUBCUTANEOUS at 18:48

## 2025-02-04 RX ADMIN — OXYCODONE HYDROCHLORIDE 5 MG: 5 TABLET ORAL at 00:50

## 2025-02-04 RX ADMIN — HYDROMORPHONE HYDROCHLORIDE 0.4 MG: 0.2 INJECTION, SOLUTION INTRAMUSCULAR; INTRAVENOUS; SUBCUTANEOUS at 18:38

## 2025-02-04 RX ADMIN — SODIUM CHLORIDE: 900 INJECTION, SOLUTION INTRAVENOUS at 20:26

## 2025-02-04 RX ADMIN — INSULIN ASPART 1 UNITS: 100 INJECTION, SOLUTION INTRAVENOUS; SUBCUTANEOUS at 08:33

## 2025-02-04 RX ADMIN — LABETALOL HYDROCHLORIDE 10 MG: 5 INJECTION, SOLUTION INTRAVENOUS at 18:40

## 2025-02-04 RX ADMIN — TIZANIDINE 4 MG: 4 TABLET ORAL at 21:30

## 2025-02-04 RX ADMIN — HYDRALAZINE HYDROCHLORIDE 10 MG: 20 INJECTION INTRAMUSCULAR; INTRAVENOUS at 14:30

## 2025-02-04 RX ADMIN — IPRATROPIUM BROMIDE AND ALBUTEROL SULFATE 3 ML: .5; 3 SOLUTION RESPIRATORY (INHALATION) at 15:41

## 2025-02-04 RX ADMIN — HYDROMORPHONE HYDROCHLORIDE 2 MG: 2 TABLET ORAL at 23:35

## 2025-02-04 RX ADMIN — SODIUM CHLORIDE, POTASSIUM CHLORIDE, SODIUM LACTATE AND CALCIUM CHLORIDE: 600; 310; 30; 20 INJECTION, SOLUTION INTRAVENOUS at 17:24

## 2025-02-04 RX ADMIN — PIPERACILLIN AND TAZOBACTAM 3.38 G: 3; .375 INJECTION, POWDER, FOR SOLUTION INTRAVENOUS at 22:41

## 2025-02-04 RX ADMIN — HYDROMORPHONE HYDROCHLORIDE 0.4 MG: 0.2 INJECTION, SOLUTION INTRAMUSCULAR; INTRAVENOUS; SUBCUTANEOUS at 02:24

## 2025-02-04 RX ADMIN — ACETAMINOPHEN 650 MG: 325 TABLET, FILM COATED ORAL at 12:05

## 2025-02-04 RX ADMIN — PHENYLEPHRINE HYDROCHLORIDE 50 MCG: 10 INJECTION INTRAVENOUS at 17:06

## 2025-02-04 RX ADMIN — HYDROMORPHONE HYDROCHLORIDE 0.5 MG: 1 INJECTION, SOLUTION INTRAMUSCULAR; INTRAVENOUS; SUBCUTANEOUS at 17:18

## 2025-02-04 RX ADMIN — FENTANYL CITRATE 100 MCG: 50 INJECTION INTRAMUSCULAR; INTRAVENOUS at 16:40

## 2025-02-04 RX ADMIN — GABAPENTIN 1200 MG: 400 CAPSULE ORAL at 20:34

## 2025-02-04 RX ADMIN — VANCOMYCIN HYDROCHLORIDE 1500 MG: 10 INJECTION, POWDER, LYOPHILIZED, FOR SOLUTION INTRAVENOUS at 15:27

## 2025-02-04 RX ADMIN — FENTANYL CITRATE 25 MCG: 50 INJECTION INTRAMUSCULAR; INTRAVENOUS at 16:54

## 2025-02-04 RX ADMIN — ACETAMINOPHEN 650 MG: 325 TABLET, FILM COATED ORAL at 07:27

## 2025-02-04 RX ADMIN — HYDROMORPHONE HYDROCHLORIDE 0.5 MG: 1 INJECTION, SOLUTION INTRAMUSCULAR; INTRAVENOUS; SUBCUTANEOUS at 17:15

## 2025-02-04 RX ADMIN — HYDROXYZINE HYDROCHLORIDE 50 MG: 50 TABLET, FILM COATED ORAL at 21:30

## 2025-02-04 RX ADMIN — MIDAZOLAM 2 MG: 1 INJECTION INTRAMUSCULAR; INTRAVENOUS at 16:32

## 2025-02-04 RX ADMIN — PANTOPRAZOLE SODIUM 40 MG: 40 TABLET, DELAYED RELEASE ORAL at 20:34

## 2025-02-04 RX ADMIN — SODIUM CHLORIDE 65 ML: 9 INJECTION, SOLUTION INTRAVENOUS at 09:43

## 2025-02-04 RX ADMIN — PROPOFOL 100 MG: 10 INJECTION, EMULSION INTRAVENOUS at 16:43

## 2025-02-04 RX ADMIN — LIDOCAINE HYDROCHLORIDE 50 MG: 20 INJECTION, SOLUTION INFILTRATION; PERINEURAL at 16:40

## 2025-02-04 RX ADMIN — SODIUM CHLORIDE, POTASSIUM CHLORIDE, SODIUM LACTATE AND CALCIUM CHLORIDE: 600; 310; 30; 20 INJECTION, SOLUTION INTRAVENOUS at 16:38

## 2025-02-04 ASSESSMENT — ACTIVITIES OF DAILY LIVING (ADL)
ADLS_ACUITY_SCORE: 48
ADLS_ACUITY_SCORE: 30
ADLS_ACUITY_SCORE: 30
ADLS_ACUITY_SCORE: 48
ADLS_ACUITY_SCORE: 30
ADLS_ACUITY_SCORE: 48

## 2025-02-04 ASSESSMENT — LIFESTYLE VARIABLES: TOBACCO_USE: 1

## 2025-02-04 ASSESSMENT — ENCOUNTER SYMPTOMS: DYSRHYTHMIAS: 1

## 2025-02-04 NOTE — PHARMACY-VANCOMYCIN DOSING SERVICE
"Pharmacy Vancomycin Initial Note  Date of Service February 3, 2025  Patient's  1980  44 year old, male    Indication: Skin and Soft Tissue Infection    Current estimated CrCl = Estimated Creatinine Clearance: 151.4 mL/min (based on SCr of 0.88 mg/dL).    Creatinine for last 3 days  2/3/2025:  6:24 PM Creatinine 0.88 mg/dL    Recent Vancomycin Level(s) for last 3 days  No results found for requested labs within last 3 days.      Vancomycin IV Administrations (past 72 hours)        No vancomycin orders with administrations in past 72 hours.                    Nephrotoxins and other renal medications (From now, onward)      Start     Dose/Rate Route Frequency Ordered Stop    25 2315  vancomycin (VANCOCIN) 2,500 mg in 0.9% NaCl 525 mL intermittent infusion         2,500 mg  over 120 Minutes Intravenous ONCE 25 23125 2310  piperacillin-tazobactam (ZOSYN) 3.375 g vial to attach to  mL bag        Note to Pharmacy: For [facility], [facility] and [facility]: For Zosyn-naive patients, use the \"Zosyn initial dose + extended infusion\" order panel.    3.375 g  over 30 Minutes Intravenous EVERY 6 HOURS 25 2307              Contrast Orders - past 72 hours (72h ago, onward)      None            [employer] Prediction of Planned Initial Vancomycin Regimen  Loading dose: 2500 mg at 00:00 2025.  Regimen: 1500 mg IV every 12 hours.  Start time: 12:00 on 2025  Exposure target: AUC24 (range)400-600 mg/L.hr   AUC24,ss: 550 mg/L.hr  Probability of AUC24 > 400: 81 %  Ctrough,ss: 17.9 mg/L  Probability of Ctrough,ss > 20: 41 %  Probability of nephrotoxicity (Lodise ISSAC ): 14 %          Plan:  Start vancomycin 1500 mg IV q12h.   Vancomycin monitoring method: AUC  Vancomycin therapeutic monitoring goal: 400-600 mg*h/L  Pharmacy will check vancomycin levels as appropriate in 1-3 Days.    Serum creatinine levels will be ordered daily for the first week of therapy and at least twice weekly for subsequent " weeks.      Filiberto Buchanan, [facility]

## 2025-02-04 NOTE — PLAN OF CARE
[facility]    ED Boarding Nurse Handoff Addendum Report:    Date/time: 2/4/2025, 5:07 AM    Neuro: Alert and Oriented x4  Activity: have not been out of bed yet  Telemetry Monitoring: No  Pain: complaining of 8/10 pain in their groin and back.  Dilaudid, Oxycodone, and Flexeril given for pain.  Medicatons decreased pain.  LDA's: Peripheral  Fluids: has Normal Saline 0.9% running at 100 mL per hour.  Diet: NPO  Consults: Urology    Plan of Care:    Urology consult. IVF. IV abx. Pain management.     Vital signs (within last 30 minutes):    Vitals:    02/03/25 1427 02/03/25 2300 02/04/25 0326 02/04/25 0452   BP: (!) 145/101 (!) 182/113 (!) 153/107 (!) 164/102   BP Location:  Left arm Left arm Right arm   Patient Position:  Supine Supine Supine   Cuff Size:  Adult Regular Adult Regular Adult Regular   Pulse: 95 95  86   Resp: 17  20 22   Temp: 98.3  F (36.8  C) 98.7  F (37.1  C) 98.4  F (36.9  C)    TempSrc: Oral Oral Oral    SpO2: 98% 97% 93% 95%   Weight: 128.3 kg (282 lb 13.6 oz)      Height: 1.829 m (6')          ED Boarding Nurse name: Estee Kebede RN

## 2025-02-04 NOTE — ANESTHESIA PROCEDURE NOTES
Airway       Patient location during procedure: OR  Staff -        Anesthesiologist:  Sandra Granger MD       CRNA: Severson, Dean Dennis, APRN CRNA       Performed By: CRNA  Consent for Airway        Urgency: elective  Indications and Patient Condition       Indications for airway management: maki-procedural and airway protection       Induction type:intravenous       Mask difficulty assessment: 2 - vent by mask + OA or adjuvant +/- NMBA    Final Airway Details       Final airway type: supraglottic airway    Supraglottic Airway Details        Type: LMA       Brand: LMA Unique       LMA size: 5    Post intubation assessment        Placement verified by: capnometry, equal breath sounds and chest rise        Number of attempts at approach: 1       Number of other approaches attempted: 0       Secured with: tape       Ease of procedure: easy       Dentition: Intact and Unchanged

## 2025-02-04 NOTE — OP NOTE
[facility]  Operative Note    Pre-operative diagnosis: Swelling of right half of scrotum [N50.89]   Post-operative diagnosis Swelling of right half of scrotum [N50.89]   Procedure: Procedure(s):  scrotal exploration, drainage of right scrotal fluid collection, scrotal drain placement   Surgeon: Aravind Buchanan MD   Assistants(s): none   Anesthesia: General    Estimated blood loss: Minimal    Total IV fluids: (See anesthesia record)   Blood transfusion: No transfusion was given during surgery   Total urine output: (See anesthesia record)   Drains: Penrose drain in right scrotum   Specimens: ID Type Source Tests Collected by Time Destination   A : Right scrotum fluid Abscess Scrotum ANAEROBIC BACTERIAL CULTURE ROUTINE, AEROBIC BACTERIAL CULTURE ROUTINE Aravind Buchanan MD 2/4/2025  5:15 PM         Implants: * No implants in log *       Findings:   Spontaneous reduction of right scrotal fluid into abdomen with induction of anesthesia. Small amount of serosanguinous fluid within the right hemiscrotum without obvious patency of external right inguinal ring. Penrose drain placed   Complications: None.   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 We discussed theoretical risk of transscrotal approach regarding cancer spread/seeding, however he is now two weeks postop from a successful radical orchiectomy for low stage seminoma so this is likely a low risk. Additional risks include bleeding, infection, pain, recurrent fluid collection discussed. Informed consent obtained    The patient was brought to operating room #3.  Preop antibiotics were given prior to the procedure.  General anesthesia was induced, he was placed in supine position, shaved, prepped and draped in  standard sterile fashion.  A timeout was performed.    On examination there was a very firm large scrotal fluid collection within the right hemiscrotum initially.  However as general anesthesia was deepened, the fluid collection appeared to spontaneously reduce back up into the abdomen and it essentially disappeared by the time I was about to make incision.  I considered consulting general surgery for assistance given concern for hernia however I again reviewed the CT imaging and did not see any evidence of any hernia.  A 3 cm transverse right scrotal incision was made using a #15 blade and dissection was brought down to the dartos fascia using cautery.  A small incision to the dartos fascia was made using sharp dissection and there was immediate drainage of some serosanguineous fluid.  Some fluid was collected and sent for culture. I carefully opened up the dartos fascia sharply and did not see any evidence of bowel.  I saw where the gubernaculum had been tied off previously.  The incision was opened up and the inguinal canal was explored using a finger.  There was no obvious patent connection into the abdomen and the external ring felt closed.  The operative field was thoroughly irrigated with saline. I opted to use a 3-0 Vicryl to create a pursestring in the right inguinal canal to reduce any flow of fluid into the right hemiscrotum.  1/2 inch Penrose drain was then placed through a dependent right scrotal counterincision and secured to the skin using a nylon suture.  Dartos fascia was closed using running 3-0 Vicryl.  Skin was closed using running 3-0 horizontal mattress chromic suture.  Bacitracin was applied and local anesthetic was infiltrated in the incision.  The patient was cleaned up and fluffs and mesh panties were applied for final dressing.  Patient was cleaned up, awoken from anesthesia and brought to PACU in stable condition      Aravind Buchanan MD   [facility]  [phone] clinic phone
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
## 2025-02-04 NOTE — PHARMACY-ADMISSION MEDICATION HISTORY
Pharmacist Admission Medication History    Admission medication history is complete. The information provided in this note is only as accurate as the sources available at the time of the update.    Information Source(s): Patient, Family member, and CareEverywhere/SureScripts via in-person    Pertinent Information: Pt took Ibuprofen 800mg x2 doses on Sun 2/2/2025.    Changes made to PTA medication list:  Added: None  Deleted: None  Changed:            -Tylenol 1000mg bid-->QID prn           -Atarax 50mg at bedtime prn-->50mg at bedtime           - Scheduled Triamcinolone cream-->prn    For patients on insulin therapy:  Do you use sliding scale insulin based on blood sugars? Yes  Do you typically eat three meals a day? Yes  How many times do you check your blood glucose per day? GCM  How many episodes of hypoglycemia do you typically have per month? 0-5 times per month when he works due to fluctuating eating schedule.    Allergies reviewed with patient and updates made in EHR: yes    Medication History Completed By: Cielo Matta PharmD 2/4/2025 10:30 AM    PTA Med List   Medication Sig Last Dose/Taking    acetaminophen (TYLENOL) 500 MG tablet Take 1,000 mg by mouth 4 times daily as needed. 2/3/2025    albuterol (PROAIR HFA/PROVENTIL HFA/VENTOLIN HFA) 108 (90 Base) MCG/ACT inhaler Inhale 1-2 puffs into the lungs every 6 hours as needed for shortness of breath / dyspnea or wheezing Taking As Needed    celecoxib (CELEBREX) 100 MG capsule Take 100 mg by mouth 2 times daily. 2/3/2025 Morning    cyclobenzaprine (FLEXERIL) 10 MG tablet Take 10 mg by mouth 3 times daily as needed for muscle spasms. 2/2/2025    gabapentin (NEURONTIN) 300 MG capsule Take 1,200 mg by mouth 3 times daily 2/3/2025 Morning    HUMALOG KWIKPEN 100 UNIT/ML soln Inject subcutaneously 3 times daily (before meals). SLIDING SCALE   <150: 0 UNITS,  150-200: 2 UNITS,   201-250: 4 UNITS,  >250: 6 UNITS.   MAX DOSE OF 20UNITS DAILY 2/2/2025 Evening     hydrOXYzine HCl (ATARAX) 25 MG tablet Take 50 mg by mouth at bedtime. 2/2/2025 Bedtime    losartan (COZAAR) 50 MG tablet Take 50 mg by mouth daily. 2/2/2025 Evening    metFORMIN (GLUCOPHAGE-XR) 500 MG 24 hr tablet Take 2,000 mg by mouth at bedtime. 2/2/2025 Bedtime    oxyCODONE (ROXICODONE) 5 MG tablet Take 1-2 tablets (5-10 mg) by mouth every 4 hours as needed for moderate to severe pain Taking As Needed    pantoprazole (PROTONIX) 40 MG EC tablet Take 1 tablet (40 mg) by mouth daily. 2/2/2025 Evening    rosuvastatin (CRESTOR) 40 MG tablet Take 40 mg by mouth daily. 2/2/2025 Evening    tiZANidine (ZANAFLEX) 2 MG tablet Take 4-6 mg by mouth at bedtime. And left shoulder 2/2/2025 Bedtime    triamcinolone (KENALOG) 0.1 % external cream Apply topically 2 times daily. Apply to affected area (Patient taking differently: Apply topically 2 times daily as needed. Apply to affected area) Taking Differently
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
## 2025-02-04 NOTE — ED NOTES
[facility]  ED Nurse Handoff Report    ED Chief complaint: Post-op Problem  . ED Diagnosis:   Final diagnoses:   Scrotal hematoma       Allergies:   Allergies   Allergen Reactions    No Known Drug Allergy     Other (Do Not Use) Blisters     Hospital socks     Semaglutide(0.25 Or 0.5mg-Dos) Other (See Comments)     Caused Pancreatitis        Code Status: Full Code    Activity level - Baseline/Home:  independent.  Activity Level - Current:   standby.   Lift room needed: No.   Bariatric: No   Needed: No   Isolation: No.   Infection: Not Applicable.     Respiratory status: Room air    Vital Signs (within 30 minutes):   Vitals:    02/03/25 1427   BP: (!) 145/101   Pulse: 95   Resp: 17   Temp: 98.3  F (36.8  C)   TempSrc: Oral   SpO2: 98%   Weight: 128.3 kg (282 lb 13.6 oz)   Height: 1.829 m (6')       Cardiac Rhythm:  ,      Pain level:    Patient confused: No.   Patient Falls Risk: nonskid shoes/slippers when out of bed, patient and family education, and assistive device/personal items within reach.   Elimination Status: Has voided     Patient Report - Initial Complaint: post-op problem.   Ozzie Olmedo is a 44 year old male who presents with swelling of the right testicle.  Patient is 44 and has a history of recent orchiectomy done last week.  Was seen in follow-up has been doing well overnight has developed severe swelling and pain in the right testicle.  Patient denies fever chills denies difficulty urinating.  Presents to the emergency room for assessment.   Focused Assessment:     Pt had R testicle removed 01/21. Pt had follow up on Wednesday and everything was fine. Since Wednesday per wife scrotum has had a lump that has increased in size. Pt states the scrotum has increased in redness. No fevers.       Abnormal Results:   Labs Ordered and Resulted from Time of ED Arrival to Time of ED Departure   BASIC METABOLIC PANEL - Abnormal       Result Value    Sodium 139      Potassium  5.0      Chloride 99      Carbon Dioxide (CO2) 28      Anion Gap 12      Urea Nitrogen 9.8      Creatinine 0.88      GFR Estimate >90      Calcium 8.7 (*)     Glucose 201 (*)    CBC WITH PLATELETS AND DIFFERENTIAL    WBC Count 7.8      RBC Count 4.53      Hemoglobin 14.3      Hematocrit 41.2      MCV 91      MCH 31.6      MCHC 34.7      RDW 13.2      Platelet Count 221      % Neutrophils 62      % Lymphocytes 31      % Monocytes 5      % Eosinophils 1      % Basophils 1      % Immature Granulocytes 1      NRBCs per 100 WBC 0      Absolute Neutrophils 4.8      Absolute Lymphocytes 2.4      Absolute Monocytes 0.4      Absolute Eosinophils 0.1      Absolute Basophils 0.1      Absolute Immature Granulocytes 0.1      Absolute NRBCs 0.0          US Testicular & Scrotum w Doppler Ltd   Final Result   IMPRESSION:      1.  Two complex fluid collection in the right hemiscrotum status post recent orchiectomy favoring complex postoperative seroma. No specific findings to indicate infection, however in this timeframe from surgery superimposed infection is possible.   2.  Normal size left testis.          Treatments provided: labs, imaging, see MAR  Family Comments: n/a  OBS brochure/video discussed/provided to patient:  Yes  ED Medications:   Medications   HYDROmorphone (DILAUDID) tablet 2 mg (2 mg Oral $Given 2/3/25 1948)   acetaminophen (TYLENOL) tablet 650 mg (650 mg Oral $Given 2/3/25 1607)   HYDROmorphone (DILAUDID) tablet 2 mg (2 mg Oral $Given 2/3/25 2203)   amoxicillin-clavulanate (AUGMENTIN) 875-125 MG per tablet 1 tablet (1 tablet Oral $Given 2/3/25 2205)       Drips infusing:  No  For the majority of the shift this patient was Green.   Interventions performed were n/a.    Sepsis treatment initiated: No    Cares/treatment/interventions/medications to be completed following ED care: n/a    ED Nurse Name: Aline Feliciano RN  10:46 PM
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
## 2025-02-04 NOTE — ANESTHESIA CARE TRANSFER NOTE
Patient: Ozzie Olmedo    Procedure: Procedure(s):  scrotal exploration, drainage and debridement of right scrotal fluid collection, scrotal drain placement       Diagnosis: Swelling of right half of scrotum [N50.89]  Diagnosis Additional Information: No value filed.    Anesthesia Type:   General     Note:    Oropharynx: oropharynx clear of all foreign objects and spontaneously breathing  Level of Consciousness: drowsy  Oxygen Supplementation: face mask  Level of Supplemental Oxygen (L/min / FiO2): 5  Independent Airway: airway patency satisfactory and stable  Dentition: dentition unchanged  Vital Signs Stable: post-procedure vital signs reviewed and stable  Report to RN Given: handoff report given  Patient transferred to: PACU    Handoff Report: Identifed the Patient, Identified the Reponsible Provider, Reviewed the pertinent medical history, Discussed the surgical course, Reviewed Intra-OP anesthesia mangement and issues during anesthesia, Set expectations for post-procedure period and Allowed opportunity for questions and acknowledgement of understanding    Vitals:  Vitals Value Taken Time   /95 02/04/25 1750   Temp     Pulse 91 02/04/25 1754   Resp 16 02/04/25 1754   SpO2 100 % 02/04/25 1754   Vitals shown include unfiled device data.    Electronically Signed By: Dean Dennis Severson, APRN CRNA  February 4, 2025  5:55 PM

## 2025-02-04 NOTE — PLAN OF CARE
[facility]    ED Boarding Nurse Handoff Addendum Report:    Date/time: 2/4/2025, 1:52 PM    Activity Level: independent    Fall Risk: Yes:  nonskid shoes/slippers when out of bed and patient and family education    Active Infusions: NS    Current Meds Due: IV coming from main pharmacy     Current care needs: pain management     Oxygen requirements (liters/min and/or FiO2): none    Respiratory status: Room air    Vital signs (within last 30 minutes):    Vitals:    02/04/25 1133 02/04/25 1206 02/04/25 1218 02/04/25 1338   BP: (!) 159/100   (!) 153/103   BP Location: Right arm   Right arm   Patient Position:       Cuff Size:       Pulse: 86   69   Resp: 20 18 16 18   Temp: 97.5  F (36.4  C)   97.5  F (36.4  C)   TempSrc: Oral   Oral   SpO2:    97%   Weight:       Height:           Focused assessment within last 30 minutes:    Possible OR this evening, declined to transfer to [facility] where he had his 1st surgery     ED Boarding Nurse name: David Larson RN

## 2025-02-04 NOTE — PROGRESS NOTES
[facility]    Hospitalist Progress Note      Assessment & Plan   Ozzie Olmedo is a 44 year old male with diabetes mellitus, hypertension, hyperlipidemia who underwent right orchiectomy on 1/21/2025 at [facility] by Dr. Ai Hopper who presents with swelling, redness and pain of the area.     # Postoperative scrotal swelling.  Complex postoperative seroma versus infected fluid collection: Patient underwent right orchiectomy on 1/21/2025 through [facility].  He was seen in follow-up on 1/29 and had noted phantom pain at that time.  Patient came to the ER today for severe swelling, pain in the right testicle that had developed.  He first started noticing the pain Friday night into Saturday morning.  It was at that time on Saturday that he knew something was a mess and needed to be seen.  He delayed being seen as his child's birthday was on Sunday.  He noted that the swelling had progressed from Saturday into today.  He has pain in the area.  No issues with urination.  No fevers or chills.  No other different symptoms.  Denies chest pain or shortness of breath.  -ED, patient afebrile and hemodynamically stable.  CBC is without leukocytosis and unremarkable.  BMP is also unremarkable.  He underwent ultrasound of the scrotum and testicle that showed 2 complex fluid collections in the right hemiscrotum favored to be a complex postoperative seroma however superimposed infection possible.  Normal left testicle noted.  ER physician discussed with [facility] urologist who recommended compression and admission.  Unfortunately, there are no beds within the [facility] system at [facility].  ER physician then discussed with Dr. Buchanan from urology who graciously agreed to evaluate the patient in the morning for possible drainage of this fluid collection.  -N.p.o., IV fluids, as needed pain medications.  -Urology consulted  -Given possible infection and he is about 2 weeks out from surgery,  "we will start him on vancomycin and Zosyn pending urology evaluation.  -Reviewed urology note. CT A/P to evaluate fluid collection. Possible OR later today. He will remain in hospital at least overnight into 2/5.  He declines transfer to [facility] and would prefer to stay here at [facility].      #Possible pancreatitis?: On CT A/P.  Added on lipase. Not having significant abdominal pain. He is already NPO and getting IVF, pain meds.      # Diabetes mellitus: Sliding scale insulin.  Hold metformin  #HTN: Await pharmacy med rec.  Resume BP meds once verified.  #Hyperlipidemia: Okay to hold statin while under observation     DVT Prophylaxis: Pneumatic Compression Devices  Code Status: Full Code  Medically Ready for Discharge: Anticipated Tomorrow    Terry Hall MD    Interval History   Still with significant scrotal pain. Evaluated by urology. No CP, SOB, fevers/chills. Declines transfer to [facility] and prefers to continue treatment here at [facility].     -Data reviewed today: I reviewed all new labs and imaging results over the last 24 hours. I personally reviewed     Physical Exam   Temp: 97.5  F (36.4  C) Temp src: Oral BP: (!) 159/100 Pulse: 86   Resp: 20 SpO2: 94 % O2 Device: None (Room air)    Vitals:    02/03/25 1427   Weight: 128.3 kg (282 lb 13.6 oz)     Vital Signs with Ranges  Temp:  [97.5  F (36.4  C)-98.7  F (37.1  C)] 97.5  F (36.4  C)  Pulse:  [75-95] 86  Resp:  [16-22] 20  BP: (145-182)/(100-115) 159/100  SpO2:  [93 %-98 %] 94 %  No intake/output data recorded.    Constitutional: Nontoxic, NAD  HEENT: Normocephalic. MMM, No elevation of JVD noted.   Respiratory: Nl WOB, Clear bilaterally, No wheezes or crackles  Cardiovascular: Regular, no murmur  GI: BS+, NT, ND  : Per urology: \"Right inguinal incision, C/D/I with surgical glue in place. Scrotal swelling with tenderness and mild erythema. Palpable fluid collection that feels like hematoma on right. Left testicle slightly difficult to " "palpate.\"  Skin/Integumen: WWP, no rash. No edema  Neuro: CNII-XII intact. Moves all extremities. No tremor. A&Ox3.    Medications   Current Facility-Administered Medications   Medication Dose Route Frequency Provider Last Rate Last Admin    sodium chloride 0.9 % infusion   Intravenous Continuous Terry Hall  mL/hr at 02/04/25 0728 Rate Verify at 02/04/25 0728     Current Facility-Administered Medications   Medication Dose Route Frequency Provider Last Rate Last Admin    insulin aspart (NovoLOG) injection (RAPID ACTING)  1-7 Units Subcutaneous Q4H Terry Hall MD   1 Units at 02/04/25 0833    piperacillin-tazobactam (ZOSYN) 3.375 g vial to attach to  mL bag  3.375 g Intravenous Q6H Terry Hall MD   3.375 g at 02/04/25 0533    vancomycin (VANCOCIN) 1,500 mg in 0.9% NaCl 265 mL intermittent infusion  1,500 mg Intravenous Q12H Terry Hall MD           Data   Recent Labs   Lab 02/04/25  1115 02/04/25  0721 02/04/25  0617 02/03/25  2341 02/03/25  1824   WBC  --  9.1  --   --  7.8   HGB  --  14.1  --   --  14.3   MCV  --  91  --   --  91   PLT  --  195  --   --  221   NA  --  136  --   --  139   POTASSIUM  --  4.3  --   --  5.0   CHLORIDE  --  97*  --   --  99   CO2  --  27  --   --  28   BUN  --  9.2  --   --  9.8   CR  --  0.91  --   --  0.88   ANIONGAP  --  12  --   --  12   SYLVESTER  --  8.3*  --   --  8.7*   * 154* 149*   < > 201*    < > = values in this interval not displayed.       Recent Results (from the past 24 hours)   US Testicular & Scrotum w Doppler Ltd    Narrative    EXAM: US TESTICULAR AND SCROTUM WITH DOPPLER LIMITED  LOCATION: [facility]  DATE: 2/3/2025    INDICATION: eval post right orchioectomy swelling  COMPARISON: Ultrasound technologist notes from recent scrotal ultrasound 1/10/2025  TECHNIQUE: Ultrasound of scrotum with color flow and spectral Doppler with waveform analysis performed.    FINDINGS:    RIGHT: Right testis is absent. There is a complex fluid " collection with septations in the right scrotum measuring 10.4 x 4.5 x 3.2 cm. A smaller complex collection with a few internal bright reflectors is present in the inferior right scrotum measuring   2.1 x 1.9 x 1.5 cm. These 2 fluid collections may have a thin communication. There is no vascularity associated with the structures.    LEFT: Left testicle measures 4.6 x 3 x 2 cm. Normal testicle with no masses. The left testis resides high in the inguinal canal. Normal arterial duplex and normal color flow. Normal epididymis. No hydrocele. No varicocele.      Impression    IMPRESSION:    1.  Two complex fluid collection in the right hemiscrotum status post recent orchiectomy favoring complex postoperative seroma. No specific findings to indicate infection, however in this timeframe from surgery superimposed infection is possible.  2.  Normal size left testis.   CT Abdomen Pelvis w Contrast    Narrative    EXAM: CT ABDOMEN PELVIS W CONTRAST  LOCATION: [facility]  DATE: 2/4/2025    INDICATION: post orchiectomy, seroma hematoma; surgical planning  COMPARISON: Scrotal ultrasound 02/03/2025. CT abdomen pelvis 09/25/2024.  TECHNIQUE: CT scan of the abdomen and pelvis was performed following injection of IV contrast. Multiplanar reformats were obtained. Dose reduction techniques were used.  CONTRAST: 100mL Isovue 370    FINDINGS:   LOWER CHEST: Normal.    HEPATOBILIARY: Diffuse hepatic steatosis. The liver is mildly enlarged measuring up to 17.8 cm in craniocaudal dimension. No radiopaque gallstone. No biliary ductal dilatation.    PANCREAS: Peripancreatic inflammatory stranding about the pancreatic head and uncinate process which is worsened from prior, likely representing recurrent acute interstitial pancreatitis no dilatation of the main pancreatic duct.    SPLEEN: Normal.    ADRENAL GLANDS: Normal.    KIDNEYS/BLADDER: Normal.    BOWEL: Mild diffuse wall thickening of the duodenum which likely  represents reactive duodenitis. No evidence of bowel obstruction. Normal appendix.    LYMPH NODES: Mildly enlarged portacaval lymph node, favored reactive.    VASCULATURE: Mild vascular calcifications without abdominal aortic aneurysm. The portal vein, splenic vein, and SMV are patent.    PELVIC ORGANS: Postsurgical changes of right orchiectomy. There is a fluid collection in the right scrotum and right inguinal canal measuring 4.7 x 3.8 x 10.5 cm which demonstrates peripheral enhancement along its inferior aspect. Additional diffuse   scrotal edema and inflammatory stranding. The left testicle is high riding within the inguinal canal.    MUSCULOSKELETAL: Postsurgical changes of the right groin with mild inflammatory stranding and small amount of nonorganized fluid. Multilevel degenerative changes of the spine were charted advanced in the lower lumbar spine. Bilateral L5 pars defects with   grade 1 anterolisthesis of L5 on S1.      Impression    IMPRESSION:   1.  Postsurgical changes of right orchiectomy. There is a peripherally enhancing postoperative fluid collection in the right scrotum and inguinal canal which is sterility indeterminate by imaging. Additional nonorganized scrotal fluid and edema. The left   testicle is high riding within the inguinal canal.  2.  Findings suggestive of recurrent acute interstitial pancreatitis. Recommend correlation with lipase.  3.  Mild diffuse wall thickening of the duodenum which likely represents reactive duodenitis.  4.  Hepatic steatosis and mild hepatomegaly.

## 2025-02-05 VITALS
TEMPERATURE: 98.3 F | BODY MASS INDEX: 38.09 KG/M2 | RESPIRATION RATE: 18 BRPM | WEIGHT: 281.2 LBS | DIASTOLIC BLOOD PRESSURE: 86 MMHG | OXYGEN SATURATION: 96 % | SYSTOLIC BLOOD PRESSURE: 160 MMHG | HEIGHT: 72 IN | HEART RATE: 100 BPM

## 2025-02-05 LAB
CREAT SERPL-MCNC: 0.85 MG/DL (ref 0.67–1.17)
EGFRCR SERPLBLD CKD-EPI 2021: >90 ML/MIN/1.73M2
GLUCOSE BLDC GLUCOMTR-MCNC: 247 MG/DL (ref 70–99)
GLUCOSE BLDC GLUCOMTR-MCNC: 270 MG/DL (ref 70–99)
GLUCOSE BLDC GLUCOMTR-MCNC: 288 MG/DL (ref 70–99)

## 2025-02-05 PROCEDURE — 258N000003 HC RX IP 258 OP 636: Performed by: HOSPITALIST

## 2025-02-05 PROCEDURE — 250N000013 HC RX MED GY IP 250 OP 250 PS 637: Performed by: HOSPITALIST

## 2025-02-05 PROCEDURE — 250N000009 HC RX 250: Performed by: STUDENT IN AN ORGANIZED HEALTH CARE EDUCATION/TRAINING PROGRAM

## 2025-02-05 PROCEDURE — 250N000011 HC RX IP 250 OP 636: Performed by: HOSPITALIST

## 2025-02-05 PROCEDURE — 99239 HOSP IP/OBS DSCHRG MGMT >30: CPT | Performed by: HOSPITALIST

## 2025-02-05 PROCEDURE — 82565 ASSAY OF CREATININE: CPT | Performed by: HOSPITALIST

## 2025-02-05 PROCEDURE — 36415 COLL VENOUS BLD VENIPUNCTURE: CPT | Performed by: HOSPITALIST

## 2025-02-05 PROCEDURE — 250N000013 HC RX MED GY IP 250 OP 250 PS 637: Performed by: STUDENT IN AN ORGANIZED HEALTH CARE EDUCATION/TRAINING PROGRAM

## 2025-02-05 RX ORDER — OXYCODONE HYDROCHLORIDE 10 MG/1
10 TABLET ORAL EVERY 4 HOURS PRN
Status: DISCONTINUED | OUTPATIENT
Start: 2025-02-05 | End: 2025-02-05

## 2025-02-05 RX ORDER — LEVOFLOXACIN 500 MG/1
500 TABLET, FILM COATED ORAL DAILY
Qty: 7 TABLET | Refills: 0 | Status: SHIPPED | OUTPATIENT
Start: 2025-02-05

## 2025-02-05 RX ORDER — OXYCODONE HYDROCHLORIDE 15 MG/1
15 TABLET ORAL EVERY 6 HOURS PRN
Qty: 10 TABLET | Refills: 0 | Status: SHIPPED | OUTPATIENT
Start: 2025-02-05

## 2025-02-05 RX ORDER — CELECOXIB 100 MG/1
100 CAPSULE ORAL 2 TIMES DAILY
Status: DISCONTINUED | OUTPATIENT
Start: 2025-02-05 | End: 2025-02-05 | Stop reason: HOSPADM

## 2025-02-05 RX ORDER — OXYCODONE HYDROCHLORIDE 5 MG/1
5 TABLET ORAL EVERY 4 HOURS PRN
Status: DISCONTINUED | OUTPATIENT
Start: 2025-02-05 | End: 2025-02-05

## 2025-02-05 RX ORDER — GINSENG 100 MG
CAPSULE ORAL 2 TIMES DAILY
Status: DISCONTINUED | OUTPATIENT
Start: 2025-02-05 | End: 2025-02-05 | Stop reason: HOSPADM

## 2025-02-05 RX ORDER — NALOXONE HYDROCHLORIDE 0.4 MG/ML
0.2 INJECTION, SOLUTION INTRAMUSCULAR; INTRAVENOUS; SUBCUTANEOUS
Status: DISCONTINUED | OUTPATIENT
Start: 2025-02-05 | End: 2025-02-05 | Stop reason: HOSPADM

## 2025-02-05 RX ORDER — METFORMIN HYDROCHLORIDE 500 MG/1
2000 TABLET, EXTENDED RELEASE ORAL AT BEDTIME
Status: DISCONTINUED | OUTPATIENT
Start: 2025-02-05 | End: 2025-02-05 | Stop reason: HOSPADM

## 2025-02-05 RX ORDER — NALOXONE HYDROCHLORIDE 0.4 MG/ML
0.4 INJECTION, SOLUTION INTRAMUSCULAR; INTRAVENOUS; SUBCUTANEOUS
Status: DISCONTINUED | OUTPATIENT
Start: 2025-02-05 | End: 2025-02-05 | Stop reason: HOSPADM

## 2025-02-05 RX ORDER — GINSENG 100 MG
CAPSULE ORAL 2 TIMES DAILY
Qty: 14 G | Refills: 0 | Status: SHIPPED | OUTPATIENT
Start: 2025-02-05

## 2025-02-05 RX ADMIN — CELECOXIB 100 MG: 100 CAPSULE ORAL at 12:15

## 2025-02-05 RX ADMIN — VANCOMYCIN HYDROCHLORIDE 1500 MG: 10 INJECTION, POWDER, LYOPHILIZED, FOR SOLUTION INTRAVENOUS at 12:17

## 2025-02-05 RX ADMIN — PIPERACILLIN AND TAZOBACTAM 3.38 G: 3; .375 INJECTION, POWDER, FOR SOLUTION INTRAVENOUS at 11:07

## 2025-02-05 RX ADMIN — OXYCODONE HYDROCHLORIDE 15 MG: 10 TABLET ORAL at 12:15

## 2025-02-05 RX ADMIN — HYDROMORPHONE HYDROCHLORIDE 0.4 MG: 0.2 INJECTION, SOLUTION INTRAMUSCULAR; INTRAVENOUS; SUBCUTANEOUS at 10:12

## 2025-02-05 RX ADMIN — SODIUM CHLORIDE: 900 INJECTION, SOLUTION INTRAVENOUS at 09:02

## 2025-02-05 RX ADMIN — CYCLOBENZAPRINE 10 MG: 10 TABLET, FILM COATED ORAL at 01:02

## 2025-02-05 RX ADMIN — PIPERACILLIN AND TAZOBACTAM 3.38 G: 3; .375 INJECTION, POWDER, FOR SOLUTION INTRAVENOUS at 05:11

## 2025-02-05 RX ADMIN — OXYCODONE HYDROCHLORIDE 15 MG: 10 TABLET ORAL at 16:17

## 2025-02-05 RX ADMIN — GABAPENTIN 1200 MG: 400 CAPSULE ORAL at 13:27

## 2025-02-05 RX ADMIN — GABAPENTIN 1200 MG: 400 CAPSULE ORAL at 07:58

## 2025-02-05 RX ADMIN — OXYCODONE HYDROCHLORIDE 5 MG: 5 TABLET ORAL at 07:58

## 2025-02-05 RX ADMIN — BACITRACIN: 500 OINTMENT TOPICAL at 10:12

## 2025-02-05 RX ADMIN — HYDROMORPHONE HYDROCHLORIDE 0.2 MG: 0.2 INJECTION, SOLUTION INTRAMUSCULAR; INTRAVENOUS; SUBCUTANEOUS at 03:25

## 2025-02-05 RX ADMIN — CYCLOBENZAPRINE 10 MG: 10 TABLET, FILM COATED ORAL at 13:27

## 2025-02-05 RX ADMIN — OXYCODONE HYDROCHLORIDE 5 MG: 5 TABLET ORAL at 01:02

## 2025-02-05 ASSESSMENT — ACTIVITIES OF DAILY LIVING (ADL)
ADLS_ACUITY_SCORE: 28
ADLS_ACUITY_SCORE: 30
ADLS_ACUITY_SCORE: 29
ADLS_ACUITY_SCORE: 28
ADLS_ACUITY_SCORE: 29
ADLS_ACUITY_SCORE: 28

## 2025-02-05 NOTE — PLAN OF CARE
"Patient is alert and oriented x 4. VS WNL and documented on the FS and patient is on RA. Active bowel sounds with LBM.  Patient denies any pain, urgency, and frequency when voiding. Pt is tolerating regular diet. ACHS BS checks. IV Vancomycin administered as per orders. Patient rating any 6-8/10 scrotal pain. Pain being controlled with PRN IV Dilaudid & PRN Oxycodone 15 mg. R scrotum penrose drain w sutures- small drainage. Patient is SBA or 1 assist with walker and gait belt. Patient lives independently with wife.      Plan: Pain management. Urology following. Discharge pt home today.    Goal Outcome Evaluation:      Plan of Care Reviewed With: patient    Overall Patient Progress: improvingOverall Patient Progress: improving      Problem: Adult Inpatient Plan of Care  Goal: Plan of Care Review  Description: The Plan of Care Review/Shift note should be completed every shift.  The Outcome Evaluation is a brief statement about your assessment that the patient is improving, declining, or no change.  This information will be displayed automatically on your shift  note.  Outcome: Progressing  Flowsheets (Taken 2/5/2025 1307)  Plan of Care Reviewed With: patient  Overall Patient Progress: improving  Goal: Patient-Specific Goal (Individualized)  Description: You can add care plan individualizations to a care plan. Examples of Individualization might be:  \"Parent requests to be called daily at 9am for status\", \"I have a hard time hearing out of my right ear\", or \"Do not touch me to wake me up as it startles  me\".  Outcome: Progressing  Goal: Absence of Hospital-Acquired Illness or Injury  Outcome: Progressing  Intervention: Identify and Manage Fall Risk  Recent Flowsheet Documentation  Taken 2/5/2025 0800 by Dinorah Reich, RN  Safety Promotion/Fall Prevention:   clutter free environment maintained   increased rounding and observation   increase visualization of patient   nonskid shoes/slippers when out of bed   patient " and family education   room organization consistent   safety round/check completed   treat reversible contributory factors  Intervention: Prevent Skin Injury  Recent Flowsheet Documentation  Taken 2/5/2025 0800 by Dinorah Reich RN  Body Position: position changed independently  Intervention: Prevent Infection  Recent Flowsheet Documentation  Taken 2/5/2025 0800 by Dinorah Reich RN  Infection Prevention: rest/sleep promoted  Goal: Optimal Comfort and Wellbeing  Outcome: Progressing  Goal: Readiness for Transition of Care  Outcome: Progressing     Problem: Pain Acute  Goal: Optimal Pain Control and Function  Outcome: Progressing

## 2025-02-05 NOTE — PLAN OF CARE
Pt left the floor at 1530 accompanied with sister and wife for surgery. Unable to do admission questions or skin/ head to toe assessment.

## 2025-02-05 NOTE — OR NURSING
Post op blood sugar 181.  Dr Yeboah aware.  No new orders    Katie Fox RN on 2/4/2025 at 7:10 PM

## 2025-02-05 NOTE — PLAN OF CARE
ROOM # 208-1    Transport from PACU    /78 (BP Location: Left arm)   Pulse 86   Temp 98.1  F (36.7  C) (Oral)   Resp 18   Ht 1.829 m (6')   Wt 127.6 kg (281 lb 3.2 oz)   SpO2 94%   BMI 38.14 kg/m       Pt A&Ox4, SBA. NS 100ml/hr. Zosyn and Vanco. R scrotum penrose drain w sutures- small drainage. Pt complains of 8/10 pain. Managed w Dilaudid, oxy and flexeril. Pulse ox on- pt refused capno. Pt denies SOB/chest pain/N/V. Pt voiding and had BM, tolerating regular diet. Urology following. Comes from home w wife/family. Pt BS checks- Insulin managed.

## 2025-02-05 NOTE — ANESTHESIA POSTPROCEDURE EVALUATION
Patient: Ozzie Olmedo    Procedure: Procedure(s):  scrotal exploration, drainage and debridement of right scrotal fluid collection, scrotal drain placement       Anesthesia Type:  General    Note:  Disposition: Inpatient   Postop Pain Control: Uneventful            Sign Out: Well controlled pain   PONV: No   Neuro/Psych: Uneventful            Sign Out: Acceptable/Baseline neuro status   Airway/Respiratory: Uneventful            Sign Out: Acceptable/Baseline resp. status   CV/Hemodynamics: Uneventful            Sign Out: Acceptable CV status; No obvious hypovolemia; No obvious fluid overload   Other NRE:    DID A NON-ROUTINE EVENT OCCUR? No           Last vitals:  Vitals Value Taken Time   /104 02/04/25 1845   Temp 97.1  F (36.2  C) 02/04/25 1830   Pulse 93 02/04/25 1847   Resp 17 02/04/25 1847   SpO2 94 % 02/04/25 1847   Vitals shown include unfiled device data.    Electronically Signed By: Lamar Yeboah MD  February 4, 2025  6:48 PM

## 2025-02-05 NOTE — PROGRESS NOTES
[facility] Progress Note          Assessment and Plan:     Assessment:    POD 1 Scrotal exploration, drainage of right scrotal fluid collection, scrotal drain placement     Scrotal hematoma    Swelling of right half of scrotum       Plan:   -Continue with Penrose drain and scrotal support.  Patient should follow-up with primary urologist, Dr. Hopper, for timeline for drain removal and postop check.  -Would recommend patient continue on empiric antibiotics.  Discussed with Dr. Ospina.  Significant interaction with tizandine, so will plan on Levaquin 500 mg daily for 7 days.  -Pain and nausea management per primary service.  Planning for patient to do 5-10 mg of oxycodone every 4 hours, as needed.    -Dicussed scrotal support and tighter undergarments.  -If pain controlled on oral medication, okay to discharge home, later today.    Savanna Green PA-C   [facility]  [phone]               Interval History:     Doing okay.  Still having a fair amount of pain in the scrotum.  Patient typically will have 5 to 10 mg of oxycodone on hand at home for pain.  He has been receiving less than this year.  Denies fever, chills, nausea, or vomiting.  Creatinine 0.85 EGFR greater than 90.  Right scrotal incision C/D/I with Penrose drain in place. Tender with palpation but no crepitus.  Right inguinal incision C/D/IOn IV vancomycin and Zosyn.              Review of Systems:     The 5 point Review of Systems is negative other than noted in the HPI             Medications:     Current Facility-Administered Medications   Medication Dose Route Frequency Provider Last Rate Last Admin    acetaminophen (TYLENOL) tablet 650 mg  650 mg Oral Q4H PRN Terry Hall MD   650 mg at 02/04/25 1205    Or    acetaminophen (TYLENOL) Suppository 650 mg  650 mg Rectal Q4H PRN Terry Hall MD        bacitracin ointment   Topical BID Aravind Buchanan MD        cyclobenzaprine (FLEXERIL) tablet 10 mg  10 mg Oral Q8H  PRN Tsering Evans,    10 mg at 02/05/25 0102    glucose gel 15-30 g  15-30 g Oral Q15 Min PRN Terry Hall MD        Or    dextrose 50 % injection 25-50 mL  25-50 mL Intravenous Q15 Min PRN Terry Hall MD        Or    glucagon injection 1 mg  1 mg Subcutaneous Q15 Min PRN Terry Hall MD        gabapentin (NEURONTIN) capsule 1,200 mg  1,200 mg Oral TID Terry Hall MD   1,200 mg at 02/05/25 0758    hydrALAZINE (APRESOLINE) injection 10 mg  10 mg Intravenous Q4H PRN Terry Hall MD   10 mg at 02/04/25 1430    HYDROmorphone (DILAUDID) injection 0.2 mg  0.2 mg Intravenous Q2H PRN Terry Hall MD   0.2 mg at 02/05/25 0325    HYDROmorphone (DILAUDID) injection 0.4 mg  0.4 mg Intravenous Q2H PRN Terry Hall MD   0.4 mg at 02/04/25 1434    HYDROmorphone (DILAUDID) tablet 2 mg  2 mg Oral Q3H PRN Kris Sharpe MD   2 mg at 02/04/25 2335    hydrOXYzine HCl (ATARAX) tablet 50 mg  50 mg Oral At Bedtime Terry Hall MD   50 mg at 02/04/25 2130    insulin aspart (NovoLOG) injection (RAPID ACTING)  1-7 Units Subcutaneous TID AC Sandra Sauer PA-C   3 Units at 02/05/25 0759    insulin aspart (NovoLOG) injection (RAPID ACTING)  1-5 Units Subcutaneous At Bedtime Sandra Sauer PA-C   2 Units at 02/04/25 2326    losartan (COZAAR) tablet 50 mg  50 mg Oral QPM Terry Hall MD   50 mg at 02/04/25 2034    melatonin tablet 5 mg  5 mg Oral At Bedtime PRN Terry Hall MD        naloxone (NARCAN) injection 0.2 mg  0.2 mg Intravenous Q2 Min PRN Terry Hall MD        Or    naloxone (NARCAN) injection 0.4 mg  0.4 mg Intravenous Q2 Min PRN Terry Hall MD        Or    naloxone (NARCAN) injection 0.2 mg  0.2 mg Intramuscular Q2 Min PRN Terry Hall MD        Or    naloxone (NARCAN) injection 0.4 mg  0.4 mg Intramuscular Q2 Min PRN Terry Hall MD        ondansetron (ZOFRAN ODT) ODT tab 4 mg  4 mg Oral Q6H PRN Terry Hall MD        Or    ondansetron (ZOFRAN) injection 4 mg  4 mg Intravenous Q6H PRN Terry Hall MD         "oxyCODONE (ROXICODONE) tablet 5 mg  5 mg Oral Q4H PRN Savanna Green PA-C        oxyCODONE (ROXICODONE) tablet 5 mg  5 mg Oral Q4H PRN Terry Hall MD   5 mg at 02/05/25 0758    pantoprazole (PROTONIX) EC tablet 40 mg  40 mg Oral QPM Terry Hall MD   40 mg at 02/04/25 2034    piperacillin-tazobactam (ZOSYN) 3.375 g vial to attach to  mL bag  3.375 g Intravenous Q6H Terry Hall MD   3.375 g at 02/05/25 0511    prochlorperazine (COMPAZINE) injection 10 mg  10 mg Intravenous Q6H PRN Terry Hall MD        Or    prochlorperazine (COMPAZINE) tablet 10 mg  10 mg Oral Q6H PRN Terry Hall MD        senna-docusate (SENOKOT-S/PERICOLACE) 8.6-50 MG per tablet 1 tablet  1 tablet Oral BID PRN Terry Hall MD        Or    senna-docusate (SENOKOT-S/PERICOLACE) 8.6-50 MG per tablet 2 tablet  2 tablet Oral BID PRN Terry Hall MD        sodium chloride 0.9 % infusion   Intravenous Continuous Terry Hall  mL/hr at 02/05/25 0902 New Bag at 02/05/25 0902    tiZANidine (ZANAFLEX) tablet 4 mg  4 mg Oral At Bedtime Terry Hall MD   4 mg at 02/04/25 2130    vancomycin (VANCOCIN) 1,500 mg in 0.9% NaCl 265 mL intermittent infusion  1,500 mg Intravenous Q12H Terry Hall MD   1,500 mg at 02/04/25 2327                  Physical Exam:   Vitals were reviewed  Patient Vitals for the past 8 hrs:   BP Temp Temp src Pulse Resp SpO2   02/05/25 0854 (!) 140/88 98.1  F (36.7  C) Oral 91 18 96 %   02/05/25 0355 136/84 98.2  F (36.8  C) Oral 90 18 --     GEN: NAD, lying in bed  EYES: EOMI  MOUTH: MMM  RESP: Unlabored breathing  SKIN: Warm  ABD: soft  NEURO: AAO  URO: Right scrotal incision C/D/I with Penrose drain in place. Tender with palpation but no crepitus.  Right inguinal incision C/D/I           Data:   No results found for: \"NTBNPI\", \"NTBNP\"  Lab Results   Component Value Date    WBC 9.1 02/04/2025    WBC 7.8 02/03/2025    WBC 7.6 09/28/2024    HGB 14.1 02/04/2025    HGB 14.3 02/03/2025    HGB 13.9 09/28/2024    HCT 40.5 " "02/04/2025    HCT 41.2 02/03/2025    HCT 40.0 09/28/2024    MCV 91 02/04/2025    MCV 91 02/03/2025    MCV 94 09/28/2024     02/04/2025     02/03/2025     09/28/2024     Lab Results   Component Value Date    INR 0.98 03/19/2021      All cultures:  No results for input(s): \"CULTURE\" in the last 168 hours.   "

## 2025-02-05 NOTE — DISCHARGE SUMMARY
[facility]  Hospitalist Discharge Summary      Date of Admission:  2/3/2025  Date of Discharge:  2/5/2025  Discharging Provider: Cholo Matute MD  Discharge Service: Hospitalist Service    Discharge Diagnoses   Post operative scrotal hematoma  S/p right-sided radical orchiectomy with Dr. Hopper on 01/21/2025  Seminoma    Clinically Significant Risk Factors     # Obesity: Estimated body mass index is 38.14 kg/m  as calculated from the following:    Height as of this encounter: 1.829 m (6').    Weight as of this encounter: 127.6 kg (281 lb 3.2 oz).       Follow-ups Needed After Discharge   Follow-up Appointments       Follow-up and recommended labs and tests       Follow up with Dr. Hopper in 1 week as directed for drain removal/follow-up              Unresulted Labs Ordered in the Past 30 Days of this Admission       Date and Time Order Name Status Description    2/4/2025  5:17 PM Abscess Aerobic Bacterial Culture Routine In process     2/4/2025  5:17 PM Anaerobic Bacterial Culture Routine In process         These results will be followed up by Urology    Discharge Disposition   Discharged to home  Condition at discharge: Stable    Hospital Course   Ozzie Olmedo is a 44 year old male with diabetes mellitus, hypertension, hyperlipidemia who underwent right orchiectomy on 1/21/2025 at [facility] by Dr. Ai Hopper who presents with swelling, redness and pain of the area.     Patient underwent right orchiectomy on 1/21/2025 through [facility].  He was seen in follow-up on 1/29 and had noted phantom pain at that time.  Patient came to the ER today for severe swelling, pain in the right testicle that had developed.  He first started noticing the pain Friday night into Saturday morning.  It was at that time on Saturday that he knew something was a mess and needed to be seen.  He delayed being seen as his child's birthday was on Sunday.  He noted that the swelling had progressed  from Saturday into today.  He has pain in the area.  No issues with urination.  No fevers or chills.  No other different symptoms.  Denies chest pain or shortness of breath.     In the ED, patient afebrile and hemodynamically stable.  CBC is without leukocytosis and unremarkable.  BMP is also unremarkable.  He underwent ultrasound of the scrotum and testicle that showed 2 complex fluid collections in the right hemiscrotum favored to be a complex postoperative seroma however superimposed infection possible.  Normal left testicle noted.  ER physician discussed with [facility] urologist who recommended compression and admission.  Unfortunately, there are no beds within the [employer] system at [facility].  ER physician then discussed with Dr. Buchanan from urology who graciously agreed to evaluate the patient in the morning for possible drainage of this fluid collection.       # Postoperative scrotal swelling.  Complex postoperative seroma versus infected fluid collection: Patient underwent right orchiectomy on 1/21/2025 through [facility].  He was seen in follow-up on 1/29 and had noted phantom pain at that time.  Patient came to the ER today for severe swelling, pain in the right testicle that had developed.  He first started noticing the pain Friday night into Saturday morning.  It was at that time on Saturday that he knew something was a mess and needed to be seen.  He delayed being seen as his child's birthday was on Sunday.  He noted that the swelling had progressed from Saturday into today.  He has pain in the area.  No issues with urination.  No fevers or chills.  No other different symptoms.  Denies chest pain or shortness of breath.  -ED, patient afebrile and hemodynamically stable.  CBC is without leukocytosis and unremarkable.  BMP is also unremarkable.  He underwent ultrasound of the scrotum and testicle that showed 2 complex fluid collections in the right hemiscrotum favored to be a complex  postoperative seroma however superimposed infection possible.  Normal left testicle noted.  ER physician discussed with [facility] [place] urologist who recommended compression and admission.  Unfortunately, there are no beds within the [facility] [place] system at [facility].  ER physician then discussed with Dr. Buchanan from urology who graciously agreed to evaluate the patient in the morning for possible drainage of this fluid collection.  -N.p.o., IV fluids, as needed pain medications.  -Urology consulted  -Given possible infection and he is about 2 weeks out from surgery, we will start him on vancomycin and Zosyn pending urology evaluation.  -Reviewed urology note. CT A/P to evaluate fluid collection. Possible OR later today. He will remain in hospital at least overnight into 2/5.  He declines transfer to [facility] and would prefer to stay here at [facility].       #Possible pancreatitis?: On CT A/P.  Added on lipase. Not having significant abdominal pain. He is already NPO and getting IVF, pain meds.      # Diabetes mellitus: Sliding scale insulin.  Hold metformin  #HTN: Await pharmacy med rec.  Resume BP meds once verified.  #Hyperlipidemia: Okay to hold statin while under observation    I assumed care of the patient today.  He is doing well.  I spoke with urology.  Patient is able to discharge home later this afternoon if his pain is controlled.  They have already prescribed a weeks worth of Levaquin.  He is to follow-up with his own urologist, Dr. Hopper.  Patient is on chronic narcotics for shoulder and back pain.  He uses 5 mg to 10 mg of oxycodone at home.  We will try to control his pain with oral oxycodone 15 mg here.  If this works, he will discharge home.  I will send him with a small supply of 15 mg tablets to control his postsurgical pain.  His wife is at the bedside.  Her questions were answered.    Consultations This Hospital Stay   UROLOGY IP CONSULT  PHARMACY TO DOSE VANCO    Code Status   Full  Code    Time Spent on this Encounter   I, Cholo Matute MD, personally saw the patient today and spent greater than 30 minutes discharging this patient.       Cholo Matute MD  [facility] OBSERVATION DEPT  [address] [place] [address]  Phone: [phone]  ______________________________________________________________________    Physical Exam   Vital Signs: Temp: 98.1  F (36.7  C) Temp src: Oral BP: (!) 140/88 Pulse: 91   Resp: 18 SpO2: 96 % O2 Device: None (Room air) Oxygen Delivery: 2 LPM  Weight: 281 lbs 3.2 oz  Constitutional: awake, alert, cooperative, no apparent distress, and appears stated age  Eyes: Lids and lashes normal, pupils equal, round and reactive to light, extra ocular muscles intact, sclera clear, conjunctiva normal  ENT: Normocephalic, without obvious abnormality, atraumatic, sinuses nontender on palpation, external ears without lesions, oral pharynx with moist mucous membranes, tonsils without erythema or exudates, gums normal and good dentition.  Respiratory: No increased work of breathing, good air exchange, clear to auscultation bilaterally, no crackles or wheezing  Cardiovascular: Normal apical impulse, regular rate and rhythm, normal S1 and S2, no S3 or S4, and no murmur noted  GI: No scars, normal bowel sounds, soft, non-distended, non-tender, no masses palpated, no hepatosplenomegally   Scrotal incision clean and dry   Primary Care Physician   Bridget Lange    Discharge Orders      Reason for your hospital stay    Post operative scrotal fluid collection  S/p right-sided radical orchiectomy with Dr. Hopper on 01/21/2025, seminoma     Follow-up and recommended labs and tests     Follow up with Dr. Hopper in 1 week as directed for drain removal/follow-up     Activity    Your activity upon discharge: activity as tolerated     Diet    Follow this diet upon discharge: Current Diet:Orders Placed This Encounter      Moderate Consistent Carb (60 g CHO per  Meal) Diet       Significant Results and Procedures   Most Recent 3 CBC's:  Recent Labs   Lab Test 02/04/25  0721 02/03/25  1824 09/28/24  0912   WBC 9.1 7.8 7.6   HGB 14.1 14.3 13.9   MCV 91 91 94    221 209     Most Recent 3 BMP's:  Recent Labs   Lab Test 02/05/25  0717 02/05/25  0647 02/05/25  0212 02/04/25  2139 02/04/25  1115 02/04/25  0721 02/03/25  2341 02/03/25  1824 10/01/24  0829 10/01/24  0753   NA  --   --   --   --   --  136  --  139  --  134*   POTASSIUM  --   --   --   --   --  4.3  --  5.0  --  4.6   CHLORIDE  --   --   --   --   --  97*  --  99  --  96*   CO2  --   --   --   --   --  27  --  28  --  24   BUN  --   --   --   --   --  9.2  --  9.8  --  9.8   CR 0.85  --   --   --   --  0.91  --  0.88  --  1.07   ANIONGAP  --   --   --   --   --  12  --  12  --  14   SYLVESTER  --   --   --   --   --  8.3*  --  8.7*  --  9.6   GLC  --  247* 288* 273*   < > 154*   < > 201*   < > 157*    < > = values in this interval not displayed.     Most Recent 2 LFT's:  Recent Labs   Lab Test 09/28/24  0912 09/26/24  0605   AST 32 23   ALT 31 32   ALKPHOS 86 87   BILITOTAL 0.5 0.6   ,   Results for orders placed or performed during the hospital encounter of 02/03/25   US Testicular & Scrotum w Doppler Ltd    Narrative    EXAM: US TESTICULAR AND SCROTUM WITH DOPPLER LIMITED  LOCATION: [facility]  DATE: 2/3/2025    INDICATION: eval post right orchioectomy swelling  COMPARISON: Ultrasound technologist notes from recent scrotal ultrasound 1/10/2025  TECHNIQUE: Ultrasound of scrotum with color flow and spectral Doppler with waveform analysis performed.    FINDINGS:    RIGHT: Right testis is absent. There is a complex fluid collection with septations in the right scrotum measuring 10.4 x 4.5 x 3.2 cm. A smaller complex collection with a few internal bright reflectors is present in the inferior right scrotum measuring   2.1 x 1.9 x 1.5 cm. These 2 fluid collections may have a thin communication.  There is no vascularity associated with the structures.    LEFT: Left testicle measures 4.6 x 3 x 2 cm. Normal testicle with no masses. The left testis resides high in the inguinal canal. Normal arterial duplex and normal color flow. Normal epididymis. No hydrocele. No varicocele.      Impression    IMPRESSION:    1.  Two complex fluid collection in the right hemiscrotum status post recent orchiectomy favoring complex postoperative seroma. No specific findings to indicate infection, however in this timeframe from surgery superimposed infection is possible.  2.  Normal size left testis.   CT Abdomen Pelvis w Contrast    Narrative    EXAM: CT ABDOMEN PELVIS W CONTRAST  LOCATION: [facility]  DATE: 2/4/2025    INDICATION: post orchiectomy, seroma hematoma; surgical planning  COMPARISON: Scrotal ultrasound 02/03/2025. CT abdomen pelvis 09/25/2024.  TECHNIQUE: CT scan of the abdomen and pelvis was performed following injection of IV contrast. Multiplanar reformats were obtained. Dose reduction techniques were used.  CONTRAST: 100mL Isovue 370    FINDINGS:   LOWER CHEST: Normal.    HEPATOBILIARY: Diffuse hepatic steatosis. The liver is mildly enlarged measuring up to 17.8 cm in craniocaudal dimension. No radiopaque gallstone. No biliary ductal dilatation.    PANCREAS: Peripancreatic inflammatory stranding about the pancreatic head and uncinate process which is worsened from prior, likely representing recurrent acute interstitial pancreatitis no dilatation of the main pancreatic duct.    SPLEEN: Normal.    ADRENAL GLANDS: Normal.    KIDNEYS/BLADDER: Normal.    BOWEL: Mild diffuse wall thickening of the duodenum which likely represents reactive duodenitis. No evidence of bowel obstruction. Normal appendix.    LYMPH NODES: Mildly enlarged portacaval lymph node, favored reactive.    VASCULATURE: Mild vascular calcifications without abdominal aortic aneurysm. The portal vein, splenic vein, and SMV are  patent.    PELVIC ORGANS: Postsurgical changes of right orchiectomy. There is a fluid collection in the right scrotum and right inguinal canal measuring 4.7 x 3.8 x 10.5 cm which demonstrates peripheral enhancement along its inferior aspect. Additional diffuse   scrotal edema and inflammatory stranding. The left testicle is high riding within the inguinal canal.    MUSCULOSKELETAL: Postsurgical changes of the right groin with mild inflammatory stranding and small amount of nonorganized fluid. Multilevel degenerative changes of the spine were charted advanced in the lower lumbar spine. Bilateral L5 pars defects with   grade 1 anterolisthesis of L5 on S1.      Impression    IMPRESSION:   1.  Postsurgical changes of right orchiectomy. There is a peripherally enhancing postoperative fluid collection in the right scrotum and inguinal canal which is sterility indeterminate by imaging. Additional nonorganized scrotal fluid and edema. The left   testicle is high riding within the inguinal canal.  2.  Findings suggestive of recurrent acute interstitial pancreatitis. Recommend correlation with lipase.  3.  Mild diffuse wall thickening of the duodenum which likely represents reactive duodenitis.  4.  Hepatic steatosis and mild hepatomegaly.         Discharge Medications   Current Discharge Medication List        START taking these medications    Details   bacitracin 500 UNIT/GM OINT Apply topically 2 times daily.  Qty: 14 g, Refills: 0    Associated Diagnoses: Scrotal hematoma      levofloxacin (LEVAQUIN) 500 MG tablet Take 1 tablet (500 mg) by mouth daily.  Qty: 7 tablet, Refills: 0    Associated Diagnoses: Scrotal hematoma; Swelling of right half of scrotum      !! oxyCODONE (ROXICODONE) 15 MG tablet Take 1 tablet (15 mg) by mouth every 6 hours as needed for severe pain.  Qty: 10 tablet, Refills: 0    Associated Diagnoses: Scrotal hematoma       !! - Potential duplicate medications found. Please discuss with provider.         CONTINUE these medications which have NOT CHANGED    Details   acetaminophen (TYLENOL) 500 MG tablet Take 1,000 mg by mouth 4 times daily as needed.      albuterol (PROAIR HFA/PROVENTIL HFA/VENTOLIN HFA) 108 (90 Base) MCG/ACT inhaler Inhale 1-2 puffs into the lungs every 6 hours as needed for shortness of breath / dyspnea or wheezing    Comments: Pharmacy may dispense brand covered by insurance (Proair, or proventil or ventolin or generic albuterol inhaler)      celecoxib (CELEBREX) 100 MG capsule Take 100 mg by mouth 2 times daily.      cyclobenzaprine (FLEXERIL) 10 MG tablet Take 10 mg by mouth 3 times daily as needed for muscle spasms.      gabapentin (NEURONTIN) 300 MG capsule Take 1,200 mg by mouth 3 times daily      HUMALOG KWIKPEN 100 UNIT/ML soln Inject subcutaneously 3 times daily (before meals). SLIDING SCALE   <150: 0 UNITS,  150-200: 2 UNITS,   201-250: 4 UNITS,  >250: 6 UNITS.   MAX DOSE OF 20UNITS DAILY      hydrOXYzine HCl (ATARAX) 25 MG tablet Take 50 mg by mouth at bedtime.      losartan (COZAAR) 50 MG tablet Take 50 mg by mouth daily.      metFORMIN (GLUCOPHAGE-XR) 500 MG 24 hr tablet Take 2,000 mg by mouth at bedtime.      !! oxyCODONE (ROXICODONE) 5 MG tablet Take 1-2 tablets (5-10 mg) by mouth every 4 hours as needed for moderate to severe pain  Qty: 20 tablet, Refills: 0    Associated Diagnoses: Postoperative state      pantoprazole (PROTONIX) 40 MG EC tablet Take 1 tablet (40 mg) by mouth daily.  Qty: 30 tablet, Refills: 0    Associated Diagnoses: Alcohol-induced acute pancreatitis without infection or necrosis      rosuvastatin (CRESTOR) 40 MG tablet Take 40 mg by mouth daily.      tiZANidine (ZANAFLEX) 2 MG tablet Take 4-6 mg by mouth at bedtime. And left shoulder      triamcinolone (KENALOG) 0.1 % external cream Apply topically 2 times daily. Apply to affected area  Qty: 15 g, Refills: 0    Associated Diagnoses: Contact dermatitis due to other agent, unspecified contact dermatitis type        !! - Potential duplicate medications found. Please discuss with provider.        Allergies   Allergies   Allergen Reactions    No Known Drug Allergy     Other (Do Not Use) Blisters     Hospital socks     Semaglutide(0.25 Or 0.5mg-Dos) Other (See Comments)     Caused Pancreatitis

## 2025-02-06 LAB
BACTERIA ABSC ANAEROBE+AEROBE CULT: ABNORMAL
BACTERIA ABSC ANAEROBE+AEROBE CULT: NORMAL

## 2025-02-11 LAB — BACTERIA ABSC ANAEROBE+AEROBE CULT: ABNORMAL

## 2025-05-30 ENCOUNTER — HOSPITAL ENCOUNTER (INPATIENT)
Facility: CLINIC | Age: 45
LOS: 2 days | Discharge: HOME | End: 2025-06-02
Attending: EMERGENCY MEDICINE | Admitting: INTERNAL MEDICINE
Payer: COMMERCIAL

## 2025-05-30 ENCOUNTER — APPOINTMENT (OUTPATIENT)
Dept: CT IMAGING | Facility: CLINIC | Age: 45
End: 2025-05-30
Attending: EMERGENCY MEDICINE
Payer: COMMERCIAL

## 2025-05-30 DIAGNOSIS — K86.1 ACUTE ON CHRONIC PANCREATITIS (H): ICD-10-CM

## 2025-05-30 DIAGNOSIS — K85.90 ACUTE ON CHRONIC PANCREATITIS (H): ICD-10-CM

## 2025-05-30 DIAGNOSIS — R10.9 INTRACTABLE ABDOMINAL PAIN: ICD-10-CM

## 2025-05-30 DIAGNOSIS — E83.42 HYPOMAGNESEMIA: ICD-10-CM

## 2025-05-30 LAB
ALBUMIN SERPL BCG-MCNC: 4.4 G/DL (ref 3.5–5.2)
ALP SERPL-CCNC: 94 U/L (ref 40–150)
ALT SERPL W P-5'-P-CCNC: 42 U/L (ref 0–70)
ANION GAP SERPL CALCULATED.3IONS-SCNC: 22 MMOL/L (ref 7–15)
AST SERPL W P-5'-P-CCNC: 23 U/L (ref 0–45)
BASOPHILS # BLD AUTO: 0 10E3/UL (ref 0–0.2)
BASOPHILS NFR BLD AUTO: 0 %
BILIRUB SERPL-MCNC: 0.8 MG/DL
BUN SERPL-MCNC: 13.1 MG/DL (ref 6–20)
CALCIUM SERPL-MCNC: 9.1 MG/DL (ref 8.8–10.4)
CHLORIDE SERPL-SCNC: 89 MMOL/L (ref 98–107)
CREAT SERPL-MCNC: 0.95 MG/DL (ref 0.67–1.17)
EGFRCR SERPLBLD CKD-EPI 2021: >90 ML/MIN/1.73M2
EOSINOPHIL # BLD AUTO: 0.1 10E3/UL (ref 0–0.7)
EOSINOPHIL NFR BLD AUTO: 0 %
ERYTHROCYTE [DISTWIDTH] IN BLOOD BY AUTOMATED COUNT: 12.7 % (ref 10–15)
ETHANOL SERPL-MCNC: <0.01 G/DL
GLUCOSE BLDC GLUCOMTR-MCNC: 180 MG/DL (ref 70–99)
GLUCOSE BLDC GLUCOMTR-MCNC: 215 MG/DL (ref 70–99)
GLUCOSE SERPL-MCNC: 215 MG/DL (ref 70–99)
HCO3 SERPL-SCNC: 23 MMOL/L (ref 22–29)
HCT VFR BLD AUTO: 43.1 % (ref 40–53)
HGB BLD-MCNC: 15.7 G/DL (ref 13.3–17.7)
HOLD SPECIMEN: NORMAL
HOLD SPECIMEN: NORMAL
IMM GRANULOCYTES # BLD: 0.1 10E3/UL
IMM GRANULOCYTES NFR BLD: 1 %
LIPASE SERPL-CCNC: 59 U/L (ref 13–60)
LYMPHOCYTES # BLD AUTO: 1.9 10E3/UL (ref 0.8–5.3)
LYMPHOCYTES NFR BLD AUTO: 14 %
MAGNESIUM SERPL-MCNC: 1.2 MG/DL (ref 1.7–2.3)
MCH RBC QN AUTO: 31.6 PG (ref 26.5–33)
MCHC RBC AUTO-ENTMCNC: 36.4 G/DL (ref 31.5–36.5)
MCV RBC AUTO: 87 FL (ref 78–100)
MONOCYTES # BLD AUTO: 0.7 10E3/UL (ref 0–1.3)
MONOCYTES NFR BLD AUTO: 5 %
NEUTROPHILS # BLD AUTO: 11 10E3/UL (ref 1.6–8.3)
NEUTROPHILS NFR BLD AUTO: 80 %
NRBC # BLD AUTO: 0 10E3/UL
NRBC BLD AUTO-RTO: 0 /100
PLATELET # BLD AUTO: 235 10E3/UL (ref 150–450)
POTASSIUM SERPL-SCNC: 3.5 MMOL/L (ref 3.4–5.3)
PROT SERPL-MCNC: 7.1 G/DL (ref 6.4–8.3)
RBC # BLD AUTO: 4.97 10E6/UL (ref 4.4–5.9)
SODIUM SERPL-SCNC: 134 MMOL/L (ref 135–145)
WBC # BLD AUTO: 13.7 10E3/UL (ref 4–11)

## 2025-05-30 PROCEDURE — 99285 EMERGENCY DEPT VISIT HI MDM: CPT | Mod: 25

## 2025-05-30 PROCEDURE — 83735 ASSAY OF MAGNESIUM: CPT | Performed by: EMERGENCY MEDICINE

## 2025-05-30 PROCEDURE — 96376 TX/PRO/DX INJ SAME DRUG ADON: CPT

## 2025-05-30 PROCEDURE — 99223 1ST HOSP IP/OBS HIGH 75: CPT | Performed by: HOSPITALIST

## 2025-05-30 PROCEDURE — 83690 ASSAY OF LIPASE: CPT | Performed by: EMERGENCY MEDICINE

## 2025-05-30 PROCEDURE — G0378 HOSPITAL OBSERVATION PER HR: HCPCS

## 2025-05-30 PROCEDURE — 250N000013 HC RX MED GY IP 250 OP 250 PS 637: Performed by: EMERGENCY MEDICINE

## 2025-05-30 PROCEDURE — 96361 HYDRATE IV INFUSION ADD-ON: CPT

## 2025-05-30 PROCEDURE — 82962 GLUCOSE BLOOD TEST: CPT

## 2025-05-30 PROCEDURE — 250N000011 HC RX IP 250 OP 636: Mod: JZ | Performed by: EMERGENCY MEDICINE

## 2025-05-30 PROCEDURE — 80053 COMPREHEN METABOLIC PANEL: CPT | Performed by: EMERGENCY MEDICINE

## 2025-05-30 PROCEDURE — 74177 CT ABD & PELVIS W/CONTRAST: CPT

## 2025-05-30 PROCEDURE — 96375 TX/PRO/DX INJ NEW DRUG ADDON: CPT

## 2025-05-30 PROCEDURE — 36415 COLL VENOUS BLD VENIPUNCTURE: CPT | Performed by: EMERGENCY MEDICINE

## 2025-05-30 PROCEDURE — 250N000013 HC RX MED GY IP 250 OP 250 PS 637: Performed by: HOSPITALIST

## 2025-05-30 PROCEDURE — 96365 THER/PROPH/DIAG IV INF INIT: CPT

## 2025-05-30 PROCEDURE — 258N000003 HC RX IP 258 OP 636: Performed by: EMERGENCY MEDICINE

## 2025-05-30 PROCEDURE — 85025 COMPLETE CBC W/AUTO DIFF WBC: CPT | Performed by: EMERGENCY MEDICINE

## 2025-05-30 PROCEDURE — 250N000011 HC RX IP 250 OP 636: Performed by: EMERGENCY MEDICINE

## 2025-05-30 PROCEDURE — 82077 ASSAY SPEC XCP UR&BREATH IA: CPT | Performed by: EMERGENCY MEDICINE

## 2025-05-30 PROCEDURE — 250N000011 HC RX IP 250 OP 636: Mod: JZ | Performed by: HOSPITALIST

## 2025-05-30 PROCEDURE — 250N000012 HC RX MED GY IP 250 OP 636 PS 637: Performed by: HOSPITALIST

## 2025-05-30 PROCEDURE — 250N000009 HC RX 250: Performed by: EMERGENCY MEDICINE

## 2025-05-30 RX ORDER — METFORMIN HYDROCHLORIDE 500 MG/1
2000 TABLET, EXTENDED RELEASE ORAL AT BEDTIME
Status: DISCONTINUED | OUTPATIENT
Start: 2025-05-30 | End: 2025-06-02 | Stop reason: HOSPADM

## 2025-05-30 RX ORDER — DEXTROSE MONOHYDRATE 25 G/50ML
25-50 INJECTION, SOLUTION INTRAVENOUS
Status: DISCONTINUED | OUTPATIENT
Start: 2025-05-30 | End: 2025-06-01

## 2025-05-30 RX ORDER — IOPAMIDOL 755 MG/ML
500 INJECTION, SOLUTION INTRAVASCULAR ONCE
Status: COMPLETED | OUTPATIENT
Start: 2025-05-30 | End: 2025-05-30

## 2025-05-30 RX ORDER — OXYCODONE HYDROCHLORIDE 5 MG/1
5-10 TABLET ORAL EVERY 4 HOURS PRN
Status: DISCONTINUED | OUTPATIENT
Start: 2025-05-30 | End: 2025-05-30

## 2025-05-30 RX ORDER — ONDANSETRON 2 MG/ML
4 INJECTION INTRAMUSCULAR; INTRAVENOUS EVERY 6 HOURS PRN
Status: DISCONTINUED | OUTPATIENT
Start: 2025-05-30 | End: 2025-06-02 | Stop reason: HOSPADM

## 2025-05-30 RX ORDER — PROCHLORPERAZINE MALEATE 10 MG
10 TABLET ORAL EVERY 6 HOURS PRN
Status: DISCONTINUED | OUTPATIENT
Start: 2025-05-30 | End: 2025-06-02 | Stop reason: HOSPADM

## 2025-05-30 RX ORDER — AMOXICILLIN 250 MG
2 CAPSULE ORAL 2 TIMES DAILY PRN
Status: DISCONTINUED | OUTPATIENT
Start: 2025-05-30 | End: 2025-06-02 | Stop reason: HOSPADM

## 2025-05-30 RX ORDER — ENOXAPARIN SODIUM 100 MG/ML
40 INJECTION SUBCUTANEOUS EVERY 24 HOURS
Status: DISCONTINUED | OUTPATIENT
Start: 2025-05-31 | End: 2025-06-02 | Stop reason: HOSPADM

## 2025-05-30 RX ORDER — HYDROXYZINE HYDROCHLORIDE 50 MG/1
50 TABLET, FILM COATED ORAL AT BEDTIME
Status: DISCONTINUED | OUTPATIENT
Start: 2025-05-30 | End: 2025-06-02 | Stop reason: HOSPADM

## 2025-05-30 RX ORDER — MAGNESIUM HYDROXIDE/ALUMINUM HYDROXICE/SIMETHICONE 120; 1200; 1200 MG/30ML; MG/30ML; MG/30ML
30 SUSPENSION ORAL ONCE
Status: COMPLETED | OUTPATIENT
Start: 2025-05-30 | End: 2025-05-30

## 2025-05-30 RX ORDER — HYDROMORPHONE HYDROCHLORIDE 1 MG/ML
0.5 INJECTION, SOLUTION INTRAMUSCULAR; INTRAVENOUS; SUBCUTANEOUS
Status: DISCONTINUED | OUTPATIENT
Start: 2025-05-30 | End: 2025-06-01

## 2025-05-30 RX ORDER — MAGNESIUM SULFATE HEPTAHYDRATE 40 MG/ML
2 INJECTION, SOLUTION INTRAVENOUS ONCE
Status: COMPLETED | OUTPATIENT
Start: 2025-05-30 | End: 2025-05-30

## 2025-05-30 RX ORDER — ONDANSETRON 4 MG/1
4 TABLET, ORALLY DISINTEGRATING ORAL EVERY 6 HOURS PRN
Status: DISCONTINUED | OUTPATIENT
Start: 2025-05-30 | End: 2025-06-02 | Stop reason: HOSPADM

## 2025-05-30 RX ORDER — PANTOPRAZOLE SODIUM 40 MG/1
40 TABLET, DELAYED RELEASE ORAL DAILY
Status: DISCONTINUED | OUTPATIENT
Start: 2025-05-30 | End: 2025-06-02 | Stop reason: HOSPADM

## 2025-05-30 RX ORDER — HYDROMORPHONE HYDROCHLORIDE 1 MG/ML
0.5 INJECTION, SOLUTION INTRAMUSCULAR; INTRAVENOUS; SUBCUTANEOUS EVERY 4 HOURS PRN
Status: DISCONTINUED | OUTPATIENT
Start: 2025-05-30 | End: 2025-05-30

## 2025-05-30 RX ORDER — OXYCODONE HYDROCHLORIDE 10 MG/1
10 TABLET ORAL EVERY 6 HOURS PRN
Refills: 0 | Status: DISCONTINUED | OUTPATIENT
Start: 2025-05-30 | End: 2025-06-01

## 2025-05-30 RX ORDER — SODIUM CHLORIDE AND POTASSIUM CHLORIDE 150; 900 MG/100ML; MG/100ML
INJECTION, SOLUTION INTRAVENOUS CONTINUOUS
Status: DISCONTINUED | OUTPATIENT
Start: 2025-05-30 | End: 2025-05-31

## 2025-05-30 RX ORDER — CELECOXIB 100 MG/1
100 CAPSULE ORAL 2 TIMES DAILY
Status: DISCONTINUED | OUTPATIENT
Start: 2025-05-30 | End: 2025-05-31

## 2025-05-30 RX ORDER — TRIAMCINOLONE ACETONIDE 1 MG/G
CREAM TOPICAL 2 TIMES DAILY PRN
COMMUNITY

## 2025-05-30 RX ORDER — POLYETHYLENE GLYCOL 3350 17 G/17G
17 POWDER, FOR SOLUTION ORAL 2 TIMES DAILY PRN
Status: DISCONTINUED | OUTPATIENT
Start: 2025-05-30 | End: 2025-06-02 | Stop reason: HOSPADM

## 2025-05-30 RX ORDER — LOSARTAN POTASSIUM 25 MG/1
50 TABLET ORAL DAILY
Status: DISCONTINUED | OUTPATIENT
Start: 2025-05-30 | End: 2025-06-02 | Stop reason: HOSPADM

## 2025-05-30 RX ORDER — MORPHINE SULFATE 4 MG/ML
4 INJECTION, SOLUTION INTRAMUSCULAR; INTRAVENOUS
Refills: 0 | Status: COMPLETED | OUTPATIENT
Start: 2025-05-30 | End: 2025-05-30

## 2025-05-30 RX ORDER — HYDROCHLOROTHIAZIDE 12.5 MG/1
12.5 CAPSULE ORAL AT BEDTIME
COMMUNITY

## 2025-05-30 RX ORDER — GABAPENTIN 400 MG/1
1200 CAPSULE ORAL 3 TIMES DAILY
Status: DISCONTINUED | OUTPATIENT
Start: 2025-05-30 | End: 2025-05-31

## 2025-05-30 RX ORDER — AMOXICILLIN 250 MG
1 CAPSULE ORAL 2 TIMES DAILY PRN
Status: DISCONTINUED | OUTPATIENT
Start: 2025-05-30 | End: 2025-06-02 | Stop reason: HOSPADM

## 2025-05-30 RX ORDER — NICOTINE POLACRILEX 4 MG
15-30 LOZENGE BUCCAL
Status: DISCONTINUED | OUTPATIENT
Start: 2025-05-30 | End: 2025-06-01

## 2025-05-30 RX ORDER — CYCLOBENZAPRINE HCL 10 MG
10 TABLET ORAL 3 TIMES DAILY PRN
Status: DISCONTINUED | OUTPATIENT
Start: 2025-05-30 | End: 2025-06-02 | Stop reason: HOSPADM

## 2025-05-30 RX ORDER — FAMOTIDINE 20 MG/1
20 TABLET, FILM COATED ORAL ONCE
Status: COMPLETED | OUTPATIENT
Start: 2025-05-30 | End: 2025-05-30

## 2025-05-30 RX ORDER — OXYCODONE HYDROCHLORIDE 5 MG/1
5 TABLET ORAL EVERY 4 HOURS PRN
Refills: 0 | Status: DISCONTINUED | OUTPATIENT
Start: 2025-05-30 | End: 2025-06-02 | Stop reason: HOSPADM

## 2025-05-30 RX ORDER — OXYCODONE HYDROCHLORIDE 5 MG/1
15 TABLET ORAL EVERY 6 HOURS PRN
Status: DISCONTINUED | OUTPATIENT
Start: 2025-05-30 | End: 2025-05-30

## 2025-05-30 RX ORDER — ACETAMINOPHEN 500 MG
1000 TABLET ORAL EVERY 8 HOURS PRN
Status: DISCONTINUED | OUTPATIENT
Start: 2025-05-30 | End: 2025-06-02 | Stop reason: HOSPADM

## 2025-05-30 RX ORDER — HYDROCHLOROTHIAZIDE 12.5 MG/1
12.5 CAPSULE ORAL AT BEDTIME
Status: DISCONTINUED | OUTPATIENT
Start: 2025-05-30 | End: 2025-05-31

## 2025-05-30 RX ADMIN — HYDROCHLOROTHIAZIDE 12.5 MG: 12.5 CAPSULE ORAL at 21:27

## 2025-05-30 RX ADMIN — INSULIN ASPART 1 UNITS: 100 INJECTION, SOLUTION INTRAVENOUS; SUBCUTANEOUS at 16:43

## 2025-05-30 RX ADMIN — SODIUM CHLORIDE 64 ML: 9 INJECTION, SOLUTION INTRAVENOUS at 12:02

## 2025-05-30 RX ADMIN — ALUMINUM HYDROXIDE, MAGNESIUM HYDROXIDE, AND SIMETHICONE 30 ML: 200; 200; 20 SUSPENSION ORAL at 11:17

## 2025-05-30 RX ADMIN — PANTOPRAZOLE SODIUM 40 MG: 40 TABLET, DELAYED RELEASE ORAL at 14:09

## 2025-05-30 RX ADMIN — HYDROMORPHONE HYDROCHLORIDE 0.5 MG: 1 INJECTION, SOLUTION INTRAMUSCULAR; INTRAVENOUS; SUBCUTANEOUS at 14:03

## 2025-05-30 RX ADMIN — MAGNESIUM SULFATE HEPTAHYDRATE 2 G: 40 INJECTION, SOLUTION INTRAVENOUS at 11:33

## 2025-05-30 RX ADMIN — IOPAMIDOL 100 ML: 755 INJECTION, SOLUTION INTRAVENOUS at 12:02

## 2025-05-30 RX ADMIN — SODIUM CHLORIDE 1000 ML: 0.9 INJECTION, SOLUTION INTRAVENOUS at 10:57

## 2025-05-30 RX ADMIN — MORPHINE SULFATE 4 MG: 4 INJECTION INTRAVENOUS at 12:46

## 2025-05-30 RX ADMIN — FAMOTIDINE 20 MG: 20 TABLET, FILM COATED ORAL at 11:17

## 2025-05-30 RX ADMIN — LOSARTAN POTASSIUM 50 MG: 50 TABLET, FILM COATED ORAL at 14:09

## 2025-05-30 RX ADMIN — MORPHINE SULFATE 4 MG: 4 INJECTION INTRAVENOUS at 11:24

## 2025-05-30 RX ADMIN — ACETAMINOPHEN 1000 MG: 500 TABLET ORAL at 16:30

## 2025-05-30 RX ADMIN — CELECOXIB 100 MG: 100 CAPSULE ORAL at 21:31

## 2025-05-30 RX ADMIN — GABAPENTIN 1200 MG: 400 CAPSULE ORAL at 14:09

## 2025-05-30 RX ADMIN — OXYCODONE HYDROCHLORIDE 10 MG: 10 TABLET ORAL at 14:21

## 2025-05-30 RX ADMIN — CYCLOBENZAPRINE 10 MG: 10 TABLET, FILM COATED ORAL at 16:30

## 2025-05-30 RX ADMIN — HYDROMORPHONE HYDROCHLORIDE 1 MG: 1 INJECTION, SOLUTION INTRAMUSCULAR; INTRAVENOUS; SUBCUTANEOUS at 19:22

## 2025-05-30 RX ADMIN — INSULIN ASPART 2 UNITS: 100 INJECTION, SOLUTION INTRAVENOUS; SUBCUTANEOUS at 21:41

## 2025-05-30 RX ADMIN — HYDROXYZINE HYDROCHLORIDE 50 MG: 50 TABLET, FILM COATED ORAL at 21:29

## 2025-05-30 RX ADMIN — SODIUM CHLORIDE AND POTASSIUM CHLORIDE: .9; .15 SOLUTION INTRAVENOUS at 16:48

## 2025-05-30 RX ADMIN — MORPHINE SULFATE 4 MG: 4 INJECTION INTRAVENOUS at 10:57

## 2025-05-30 RX ADMIN — OXYCODONE HYDROCHLORIDE 10 MG: 10 TABLET ORAL at 21:36

## 2025-05-30 RX ADMIN — GABAPENTIN 1200 MG: 400 CAPSULE ORAL at 21:29

## 2025-05-30 ASSESSMENT — ACTIVITIES OF DAILY LIVING (ADL)
ADLS_ACUITY_SCORE: 26
ADLS_ACUITY_SCORE: 52
ADLS_ACUITY_SCORE: 52
ADLS_ACUITY_SCORE: 26
ADLS_ACUITY_SCORE: 52
ADLS_ACUITY_SCORE: 26
ADLS_ACUITY_SCORE: 52
ADLS_ACUITY_SCORE: 26

## 2025-05-30 NOTE — PHARMACY-ADMISSION MEDICATION HISTORY
Pharmacist Admission Medication History    Admission medication history is complete. The information provided in this note is only as accurate as the sources available at the time of the update.    Information Source(s): Patient and Family member via in-person    Changes made to PTA medication list:  Added: hctz  Deleted: bacitracin  Changed: tylenol to scheduled, hydroxyzine    Allergies reviewed with patient and updates made in EHR: yes    Medication History Completed By: Luis A Sterling [facility] 5/30/2025 1:29 PM    PTA Med List   Medication Sig Last Dose/Taking    acetaminophen (TYLENOL) 500 MG tablet Take 1,000 mg by mouth 4 times daily. 5/29/2025 Bedtime    albuterol (PROAIR HFA/PROVENTIL HFA/VENTOLIN HFA) 108 (90 Base) MCG/ACT inhaler Inhale 1-2 puffs into the lungs every 6 hours as needed for shortness of breath / dyspnea or wheezing Taking As Needed    celecoxib (CELEBREX) 100 MG capsule Take 100 mg by mouth 2 times daily. 5/29/2025 Bedtime    cyclobenzaprine (FLEXERIL) 10 MG tablet Take 10 mg by mouth 3 times daily as needed for muscle spasms. Taking As Needed    gabapentin (NEURONTIN) 300 MG capsule Take 1,200 mg by mouth 3 times daily 5/30/2025 Morning    HUMALOG KWIKPEN 100 UNIT/ML soln Inject subcutaneously 3 times daily (before meals). SLIDING SCALE   <150: 0 UNITS,  150-200: 2 UNITS,   201-250: 4 UNITS,  >250: 6 UNITS.   MAX DOSE OF 20UNITS DAILY Taking    hydrochlorothiazide (MICROZIDE) 12.5 MG capsule Take 12.5 mg by mouth at bedtime. 5/29/2025 Bedtime    hydrOXYzine HCl (ATARAX) 25 MG tablet Take 50 mg by mouth at bedtime. 5/29/2025 Bedtime    losartan (COZAAR) 50 MG tablet Take 50 mg by mouth at bedtime. 5/29/2025 Bedtime    metFORMIN (GLUCOPHAGE-XR) 500 MG 24 hr tablet Take 2,000 mg by mouth at bedtime. 5/29/2025 Bedtime    oxyCODONE (ROXICODONE) 5 MG tablet Take 1-2 tablets (5-10 mg) by mouth every 4 hours as needed for moderate to severe pain Taking As Needed    pantoprazole (PROTONIX) 40 MG EC  tablet Take 1 tablet (40 mg) by mouth daily. 5/29/2025 Bedtime    rosuvastatin (CRESTOR) 40 MG tablet Take 40 mg by mouth at bedtime. 5/29/2025 Bedtime    tiZANidine (ZANAFLEX) 2 MG tablet Take 4-6 mg by mouth at bedtime. And left shoulder 5/29/2025 Bedtime    triamcinolone (KENALOG) 0.1 % external cream Apply topically 2 times daily as needed for irritation. Taking As Needed

## 2025-05-30 NOTE — ED NOTES
[facility]  ED Nurse Handoff Report    ED Chief complaint: Abdominal Pain  . ED Diagnosis:   Final diagnoses:   Intractable abdominal pain   Acute on chronic pancreatitis (H)   Hypomagnesemia       Allergies:   Allergies   Allergen Reactions    No Known Drug Allergy     Other (Do Not Use) Blisters     Hospital socks     Semaglutide(0.25 Or 0.5mg-Dos) Other (See Comments)     Caused Pancreatitis        Code Status: Full Code    Activity level - Baseline/Home:  independent.  Activity Level - Current:   standby.   Lift room needed: No.   Bariatric: No   Needed: No   Isolation: No.   Infection: Not Applicable.     Respiratory status: Room air    Vital Signs (within 30 minutes):   Vitals:    05/30/25 1025 05/30/25 1245   BP: (!) 147/100 (!) 158/97   Pulse: 101 90   Resp: 18 26   Temp: 98.4  F (36.9  C)    TempSrc: Tympanic    SpO2: 100% 93%   Weight: 119.1 kg (262 lb 9.1 oz)    Height: 1.829 m (6')        Cardiac Rhythm:  ,   Cardiac  Cardiac Rhythm: Normal sinus rhythm  Pain level:    Patient confused: No.   Patient Falls Risk: nonskid shoes/slippers when out of bed, arm band in place, patient and family education, assistive device/personal items within reach, activity supervised, and mobility aid in reach.   Elimination Status: Has voided     Patient Report - Initial Complaint: Abdominal pain and backpain.   Focused Assessment:      Abnormal Results:   Labs Ordered and Resulted from Time of ED Arrival to Time of ED Departure   COMPREHENSIVE METABOLIC PANEL - Abnormal       Result Value    Sodium 134 (*)     Potassium 3.5      Carbon Dioxide (CO2) 23      Anion Gap 22 (*)     Urea Nitrogen 13.1      Creatinine 0.95      GFR Estimate >90      Calcium 9.1      Chloride 89 (*)     Glucose 215 (*)     Alkaline Phosphatase 94      AST 23      ALT 42      Protein Total 7.1      Albumin 4.4      Bilirubin Total 0.8     MAGNESIUM - Abnormal    Magnesium 1.2 (*)    CBC WITH PLATELETS AND  DIFFERENTIAL - Abnormal    WBC Count 13.7 (*)     RBC Count 4.97      Hemoglobin 15.7      Hematocrit 43.1      MCV 87      MCH 31.6      MCHC 36.4      RDW 12.7      Platelet Count 235      % Neutrophils 80      % Lymphocytes 14      % Monocytes 5      % Eosinophils 0      % Basophils 0      % Immature Granulocytes 1      NRBCs per 100 WBC 0      Absolute Neutrophils 11.0 (*)     Absolute Lymphocytes 1.9      Absolute Monocytes 0.7      Absolute Eosinophils 0.1      Absolute Basophils 0.0      Absolute Immature Granulocytes 0.1      Absolute NRBCs 0.0     LIPASE - Normal    Lipase 59     ETHANOL LEVEL BLOOD - Normal    Ethanol Level Blood <0.01          CT Abdomen Pelvis w Contrast   Final Result   IMPRESSION:    1.  Mild inflammatory changes around the pancreatic head suggestive of acute interstitial edematous pancreatitis. No peripancreatic fluid collections.   2.  Hepatic steatosis.             Treatments provided: See mar  Family Comments: Wife  OBS brochure/video discussed/provided to patient:  N/A  ED Medications:   Medications   acetaminophen (TYLENOL) tablet 1,000 mg (has no administration in time range)   celecoxib (celeBREX) capsule 100 mg (has no administration in time range)   cyclobenzaprine (FLEXERIL) tablet 10 mg (has no administration in time range)   gabapentin (NEURONTIN) capsule 1,200 mg (has no administration in time range)   hydrOXYzine HCl (ATARAX) tablet 50 mg (has no administration in time range)   losartan (COZAAR) tablet 50 mg (has no administration in time range)   metFORMIN (GLUCOPHAGE XR) 24 hr tablet 2,000 mg ( Oral Automatically Held 6/2/25 2200)   pantoprazole (PROTONIX) EC tablet 40 mg (has no administration in time range)   senna-docusate (SENOKOT-S/PERICOLACE) 8.6-50 MG per tablet 1 tablet (has no administration in time range)     Or   senna-docusate (SENOKOT-S/PERICOLACE) 8.6-50 MG per tablet 2 tablet (has no administration in time range)   ondansetron (ZOFRAN ODT) ODT tab 4 mg  (has no administration in time range)     Or   ondansetron (ZOFRAN) injection 4 mg (has no administration in time range)   prochlorperazine (COMPAZINE) injection 10 mg (has no administration in time range)     Or   prochlorperazine (COMPAZINE) tablet 10 mg (has no administration in time range)   enoxaparin ANTICOAGULANT (LOVENOX) injection 40 mg (has no administration in time range)   0.9% sodium chloride + KCl 20 mEq/L infusion (has no administration in time range)   HYDROmorphone (PF) (DILAUDID) injection 0.5 mg (has no administration in time range)   HYDROmorphone (DILAUDID) injection 1 mg (has no administration in time range)   polyethylene glycol (MIRALAX) Packet 17 g (has no administration in time range)   oxyCODONE (ROXICODONE) tablet 5 mg (has no administration in time range)   oxyCODONE (ROXICODONE) tablet 10 mg (has no administration in time range)   sodium chloride 0.9% BOLUS 1,000 mL (0 mLs Intravenous Stopped 5/30/25 1214)   morphine (PF) injection 4 mg (4 mg Intravenous $Given 5/30/25 1246)   famotidine (PEPCID) tablet 20 mg (20 mg Oral $Given 5/30/25 1117)   alum & mag hydroxide-simethicone (MAALOX) suspension 30 mL (30 mLs Oral $Given 5/30/25 1117)   magnesium sulfate 2 g in 50 mL sterile water intermittent infusion (0 g Intravenous Stopped 5/30/25 1243)   iopamidol (ISOVUE-370) solution 500 mL (100 mLs Intravenous $Given 5/30/25 1202)   sodium chloride 0.9 % bag for CT scan flush (64 mLs Intravenous $Given 5/30/25 1202)       Drips infusing:  No  For the majority of the shift this patient was Green.   Interventions performed were pain medications.    Sepsis treatment initiated: No    Cares/treatment/interventions/medications to be completed following ED care: See orders    ED Nurse Name: William Waggoner RN  1:01 PM  RECEIVING UNIT ED HANDOFF REVIEW    Above ED Nurse Handoff Report was reviewed: Yes  Reviewed by: Alberto Gee RN on May 30, 2025 at 2:03 PM   CHRISTIAN Kelley called the ED to inform  them the note was read: No

## 2025-05-30 NOTE — ED TRIAGE NOTES
Here with concerns of abdominal pain that wraps around his upper abdomen. Concerned about pancreatitis which he has had twice before. States that he has not been able to eat or drink since Tuesday. Pain 9/10. Took 2 oxycodone about 3 hours ago. No vomiting but dry heaving.      Triage Assessment (Adult)       Row Name 05/30/25 1026          Triage Assessment    Airway WDL WDL        Respiratory WDL    Respiratory WDL WDL        Skin Circulation/Temperature WDL    Skin Circulation/Temperature WDL WDL        Cardiac WDL    Cardiac WDL WDL     Cardiac Rhythm NSR        Peripheral/Neurovascular WDL    Peripheral Neurovascular WDL WDL        Cognitive/Neuro/Behavioral WDL    Cognitive/Neuro/Behavioral WDL WDL
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
## 2025-05-30 NOTE — PLAN OF CARE
PRIMARY DIAGNOSIS: ACUTE PAIN  OUTPATIENT/OBSERVATION GOALS TO BE MET BEFORE DISCHARGE:  1. Pain Status: Improved but still requiring IV narcotics.    2. Return to near baseline physical activity: Yes    3. Cleared for discharge by consultants (if involved): No    Discharge Planner Nurse   Safe discharge environment identified: Yes  Barriers to discharge: Yes       Entered by: Alberto Gee RN 05/30/2025 3:54 PM     Please review provider order for any additional goals.   Nurse to notify provider when observation goals have been met and patient is ready for discharge.Goal Outcome Evaluation:       Pt a/o x4 up SBA  here with Abdominal pain and pancreatitis. NS with KCL Karishma at 125 hr. NPO

## 2025-05-30 NOTE — ED PROVIDER NOTES
"  Emergency Department Note      History of Present Illness     Chief Complaint   Abdominal Pain      LAURIE Olmedo is a 44 year old male with a history of pancreatitis, Type 2 DM, Hyperlipidemia, HTN and who presents with pain all across this upper abdomen that began 4 days ago. Patient reports his pain feels similar to pancreatitis which he has had twice. He reports drinking beer and whisky on Saturday and his pain developed on Tuesday while on his way home from work. Patient states he is urinating less than normal. He reports chills, diaphoresis, nausea and dry heaving. He denies nausea currently. He had diarrhea earlier this week that has now resolved.  He says his pain does not radiate to his back. He took two 5 mg tablets of oxycodone this morning for his pain with minimal relief. Patient is prescribed this medication as needed for his shoulder pain. The patient denies vomiting, black or bloody stools, back pain, chest pain, fever or history of abdominal surgeries. He reports history of \"stomach problems\" and cannot take NSAIDs.     Independent Historian   None    Review of External Notes   Oncology note from 2/12/25 reviewed.     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  Testicular mass  Curtis's esophagus without dysphagia  Upper GI bleed    Medications   Levaquin  Protonix  Celebrex  Flexeril  Neurontin  Kwikpen  Atarax  Cozaar  Glucophage  Crestor  Zanaflex    Surgical History   Cardiac ablation  EGD  I & D right abscess scrotum  Lumbar laminectomy two levels  Bilateral repair hammertoe  Right ulnar ligament (elbow) repair    Physical Exam     Patient Vitals for the past 24 hrs:   BP Temp Temp src Pulse Resp SpO2 Height Weight   05/30/25 1245 (!) " 158/97 -- -- 90 26 93 % -- --   05/30/25 1025 (!) 147/100 98.4  F (36.9  C) Tympanic 101 18 100 % 1.829 m (6') 119.1 kg (262 lb 9.1 oz)     Physical Exam  General: Large adult sitting upright  Eyes: PERRL, Conjunctive within normal limits. No scleral icterus.  ENT: Moist mucous membranes, oropharynx clear.   CV: Normal S1S2, no murmur, rub or gallop. Regular rate and rhythm  Resp: Clear to auscultation bilaterally, no wheezes, rales or rhonchi. Normal respiratory effort.  GI: Abdomen is soft and nondistended. Diffuse upper abdominal tenderness to palpation localizing to the epigastrium. No palpable masses. No rebound or guarding.  MSK: No edema. Nontender. Normal active range of motion.  Skin: Warm and dry. No rashes or lesions or ecchymoses on visible skin.  Neuro: Alert and oriented. Responds appropriately to all questions and commands. No focal findings appreciated. Normal muscle tone.  Psych: Normal mood and affect. Pleasant.     Diagnostics     Lab Results   Labs Ordered and Resulted from Time of ED Arrival to Time of ED Departure   COMPREHENSIVE METABOLIC PANEL - Abnormal       Result Value    Sodium 134 (*)     Potassium 3.5      Carbon Dioxide (CO2) 23      Anion Gap 22 (*)     Urea Nitrogen 13.1      Creatinine 0.95      GFR Estimate >90      Calcium 9.1      Chloride 89 (*)     Glucose 215 (*)     Alkaline Phosphatase 94      AST 23      ALT 42      Protein Total 7.1      Albumin 4.4      Bilirubin Total 0.8     MAGNESIUM - Abnormal    Magnesium 1.2 (*)    CBC WITH PLATELETS AND DIFFERENTIAL - Abnormal    WBC Count 13.7 (*)     RBC Count 4.97      Hemoglobin 15.7      Hematocrit 43.1      MCV 87      MCH 31.6      MCHC 36.4      RDW 12.7      Platelet Count 235      % Neutrophils 80      % Lymphocytes 14      % Monocytes 5      % Eosinophils 0      % Basophils 0      % Immature Granulocytes 1      NRBCs per 100 WBC 0      Absolute Neutrophils 11.0 (*)     Absolute Lymphocytes 1.9      Absolute Monocytes  0.7      Absolute Eosinophils 0.1      Absolute Basophils 0.0      Absolute Immature Granulocytes 0.1      Absolute NRBCs 0.0     LIPASE - Normal    Lipase 59     ETHANOL LEVEL BLOOD - Normal    Ethanol Level Blood <0.01       Imaging   CT Abdomen Pelvis w Contrast   Final Result   IMPRESSION:    1.  Mild inflammatory changes around the pancreatic head suggestive of acute interstitial edematous pancreatitis. No peripancreatic fluid collections.   2.  Hepatic steatosis.           Independent Interpretation   None    ED Course      Medications Administered   Medications   sodium chloride 0.9% BOLUS 1,000 mL (0 mLs Intravenous Stopped 5/30/25 1214)   morphine (PF) injection 4 mg (4 mg Intravenous $Given 5/30/25 1246)   famotidine (PEPCID) tablet 20 mg (20 mg Oral $Given 5/30/25 1117)   alum & mag hydroxide-simethicone (MAALOX) suspension 30 mL (30 mLs Oral $Given 5/30/25 1117)   magnesium sulfate 2 g in 50 mL sterile water intermittent infusion (0 g Intravenous Stopped 5/30/25 1243)   iopamidol (ISOVUE-370) solution 500 mL (100 mLs Intravenous $Given 5/30/25 1202)   sodium chloride 0.9 % bag for CT scan flush (64 mLs Intravenous $Given 5/30/25 1202)     Procedures   Procedures     Discussion of Management   Admitting Hospitalist, Dr. Matute who accepts the patient for admission    ED Course   ED Course as of 05/30/25 1244   Fri May 30, 2025   1036 I obtained history and examined the patient as noted above.     1242 I rechecked the patient and explained findings. Patient has some improvement but still complains of severe pain. We discussed plan for admission and patient is in agreement with plan.        Additional Documentation  None    Medical Decision Making / Diagnosis     CMS Diagnoses: None    MIPS   None             MDM   Ozzie Olmedo is a 44 year old male with a history of pancreatitis (alcohol induced) who presents with epigastric abdominal pain and difficulty with po intake related to pain.  The differential  diagnosis would include GERD, GIB, esophageal spasm, atypical cardiac sx's, pancreatitis, biliary colic or gallstone disease, AAA, gastroenteritis, gastritis, large vs small bowel disease, etc.  Based on history and CT,  the most likely explanation is pancreatitis although there may be some component of gastritis or PUD. No evidence of necrosis or abscess. He had no fever or toxic appearance.  There is difficulty with pain control in the ED with IV meds and therefore the patient will be admitted for ongoing fluids.  Of note, magnesium low, suspect chronic in this situation, but due to level initiated supplementation IV due to difficulties with po intake.     Disposition   The patient was admitted to the hospital.     Diagnosis     ICD-10-CM    1. Intractable abdominal pain  R10.9       2. Acute on chronic pancreatitis (H)  K85.90     K86.1       3. Hypomagnesemia  E83.42              Scribe Disclosure:  Mary GONZALES, am serving as a scribe at 10:31 AM on 5/30/2025 to document services personally performed by Mechelle Moreno MD based on my observations and the provider's statements to me.        Mechelle Moreno MD  05/30/25 [id number]

## 2025-05-31 PROBLEM — E83.42 HYPOMAGNESEMIA: Status: ACTIVE | Noted: 2025-05-31

## 2025-05-31 LAB
ANION GAP SERPL CALCULATED.3IONS-SCNC: 15 MMOL/L (ref 7–15)
BASOPHILS # BLD AUTO: 0.1 10E3/UL (ref 0–0.2)
BASOPHILS NFR BLD AUTO: 1 %
BUN SERPL-MCNC: 12.9 MG/DL (ref 6–20)
CALCIUM SERPL-MCNC: 8.8 MG/DL (ref 8.8–10.4)
CHLORIDE SERPL-SCNC: 92 MMOL/L (ref 98–107)
CREAT SERPL-MCNC: 1.82 MG/DL (ref 0.67–1.17)
CREAT UR-MCNC: 44 MG/DL
EGFRCR SERPLBLD CKD-EPI 2021: 47 ML/MIN/1.73M2
EOSINOPHIL # BLD AUTO: 0.1 10E3/UL (ref 0–0.7)
EOSINOPHIL NFR BLD AUTO: 2 %
ERYTHROCYTE [DISTWIDTH] IN BLOOD BY AUTOMATED COUNT: 13.5 % (ref 10–15)
GLUCOSE BLDC GLUCOMTR-MCNC: 140 MG/DL (ref 70–99)
GLUCOSE BLDC GLUCOMTR-MCNC: 141 MG/DL (ref 70–99)
GLUCOSE BLDC GLUCOMTR-MCNC: 146 MG/DL (ref 70–99)
GLUCOSE BLDC GLUCOMTR-MCNC: 158 MG/DL (ref 70–99)
GLUCOSE BLDC GLUCOMTR-MCNC: 178 MG/DL (ref 70–99)
GLUCOSE BLDC GLUCOMTR-MCNC: 205 MG/DL (ref 70–99)
GLUCOSE SERPL-MCNC: 163 MG/DL (ref 70–99)
HCO3 SERPL-SCNC: 23 MMOL/L (ref 22–29)
HCT VFR BLD AUTO: 38 % (ref 40–53)
HGB BLD-MCNC: 13.9 G/DL (ref 13.3–17.7)
IMM GRANULOCYTES # BLD: 0 10E3/UL
IMM GRANULOCYTES NFR BLD: 0 %
LYMPHOCYTES # BLD AUTO: 2.5 10E3/UL (ref 0.8–5.3)
LYMPHOCYTES NFR BLD AUTO: 27 %
MAGNESIUM SERPL-MCNC: 1.9 MG/DL (ref 1.7–2.3)
MCH RBC QN AUTO: 32.8 PG (ref 26.5–33)
MCHC RBC AUTO-ENTMCNC: 36.6 G/DL (ref 31.5–36.5)
MCV RBC AUTO: 90 FL (ref 78–100)
MONOCYTES # BLD AUTO: 0.8 10E3/UL (ref 0–1.3)
MONOCYTES NFR BLD AUTO: 8 %
NEUTROPHILS # BLD AUTO: 5.6 10E3/UL (ref 1.6–8.3)
NEUTROPHILS NFR BLD AUTO: 62 %
NRBC # BLD AUTO: 0 10E3/UL
NRBC BLD AUTO-RTO: 0 /100
PLATELET # BLD AUTO: 192 10E3/UL (ref 150–450)
POTASSIUM SERPL-SCNC: 4 MMOL/L (ref 3.4–5.3)
RBC # BLD AUTO: 4.24 10E6/UL (ref 4.4–5.9)
SODIUM SERPL-SCNC: 130 MMOL/L (ref 135–145)
SODIUM UR-SCNC: <20 MMOL/L
WBC # BLD AUTO: 9 10E3/UL (ref 4–11)

## 2025-05-31 PROCEDURE — G0378 HOSPITAL OBSERVATION PER HR: HCPCS

## 2025-05-31 PROCEDURE — 84300 ASSAY OF URINE SODIUM: CPT | Performed by: HOSPITALIST

## 2025-05-31 PROCEDURE — 96361 HYDRATE IV INFUSION ADD-ON: CPT

## 2025-05-31 PROCEDURE — 80048 BASIC METABOLIC PNL TOTAL CA: CPT | Performed by: HOSPITALIST

## 2025-05-31 PROCEDURE — 250N000011 HC RX IP 250 OP 636: Performed by: HOSPITALIST

## 2025-05-31 PROCEDURE — 99233 SBSQ HOSP IP/OBS HIGH 50: CPT | Performed by: HOSPITALIST

## 2025-05-31 PROCEDURE — 96372 THER/PROPH/DIAG INJ SC/IM: CPT | Performed by: HOSPITALIST

## 2025-05-31 PROCEDURE — 83735 ASSAY OF MAGNESIUM: CPT | Performed by: HOSPITALIST

## 2025-05-31 PROCEDURE — 96376 TX/PRO/DX INJ SAME DRUG ADON: CPT

## 2025-05-31 PROCEDURE — 82962 GLUCOSE BLOOD TEST: CPT

## 2025-05-31 PROCEDURE — 250N000013 HC RX MED GY IP 250 OP 250 PS 637: Performed by: HOSPITALIST

## 2025-05-31 PROCEDURE — 85025 COMPLETE CBC W/AUTO DIFF WBC: CPT | Performed by: HOSPITALIST

## 2025-05-31 PROCEDURE — 36415 COLL VENOUS BLD VENIPUNCTURE: CPT | Performed by: HOSPITALIST

## 2025-05-31 PROCEDURE — 258N000003 HC RX IP 258 OP 636: Performed by: HOSPITALIST

## 2025-05-31 PROCEDURE — 120N000001 HC R&B MED SURG/OB

## 2025-05-31 PROCEDURE — 82570 ASSAY OF URINE CREATININE: CPT | Performed by: HOSPITALIST

## 2025-05-31 RX ORDER — GABAPENTIN 400 MG/1
400 CAPSULE ORAL 2 TIMES DAILY
Status: DISCONTINUED | OUTPATIENT
Start: 2025-05-31 | End: 2025-06-02 | Stop reason: HOSPADM

## 2025-05-31 RX ORDER — SODIUM CHLORIDE 9 MG/ML
INJECTION, SOLUTION INTRAVENOUS CONTINUOUS
Status: DISCONTINUED | OUTPATIENT
Start: 2025-05-31 | End: 2025-06-02 | Stop reason: HOSPADM

## 2025-05-31 RX ORDER — AMLODIPINE BESYLATE 5 MG/1
5 TABLET ORAL DAILY
Status: DISCONTINUED | OUTPATIENT
Start: 2025-05-31 | End: 2025-06-02 | Stop reason: HOSPADM

## 2025-05-31 RX ADMIN — HYDROXYZINE HYDROCHLORIDE 50 MG: 50 TABLET, FILM COATED ORAL at 20:30

## 2025-05-31 RX ADMIN — NICOTINE POLACRILEX 2 MG: 2 GUM, CHEWING BUCCAL at 12:40

## 2025-05-31 RX ADMIN — INSULIN ASPART 1 UNITS: 100 INJECTION, SOLUTION INTRAVENOUS; SUBCUTANEOUS at 09:27

## 2025-05-31 RX ADMIN — HYDROMORPHONE HYDROCHLORIDE 1 MG: 1 INJECTION, SOLUTION INTRAMUSCULAR; INTRAVENOUS; SUBCUTANEOUS at 16:38

## 2025-05-31 RX ADMIN — HYDROMORPHONE HYDROCHLORIDE 1 MG: 1 INJECTION, SOLUTION INTRAMUSCULAR; INTRAVENOUS; SUBCUTANEOUS at 07:21

## 2025-05-31 RX ADMIN — OXYCODONE HYDROCHLORIDE 10 MG: 10 TABLET ORAL at 15:17

## 2025-05-31 RX ADMIN — AMLODIPINE BESYLATE 5 MG: 5 TABLET ORAL at 09:09

## 2025-05-31 RX ADMIN — GABAPENTIN 400 MG: 400 CAPSULE ORAL at 20:30

## 2025-05-31 RX ADMIN — INSULIN ASPART 1 UNITS: 100 INJECTION, SOLUTION INTRAVENOUS; SUBCUTANEOUS at 15:48

## 2025-05-31 RX ADMIN — SODIUM CHLORIDE: 0.9 INJECTION, SOLUTION INTRAVENOUS at 22:16

## 2025-05-31 RX ADMIN — SODIUM CHLORIDE 1000 ML: 0.9 INJECTION, SOLUTION INTRAVENOUS at 10:16

## 2025-05-31 RX ADMIN — INSULIN ASPART 2 UNITS: 100 INJECTION, SOLUTION INTRAVENOUS; SUBCUTANEOUS at 03:15

## 2025-05-31 RX ADMIN — NICOTINE POLACRILEX 2 MG: 2 GUM, CHEWING BUCCAL at 20:47

## 2025-05-31 RX ADMIN — CYCLOBENZAPRINE 10 MG: 10 TABLET, FILM COATED ORAL at 11:14

## 2025-05-31 RX ADMIN — NICOTINE POLACRILEX 2 MG: 2 GUM, CHEWING BUCCAL at 07:03

## 2025-05-31 RX ADMIN — NICOTINE POLACRILEX 2 MG: 2 GUM, CHEWING BUCCAL at 17:55

## 2025-05-31 RX ADMIN — OXYCODONE HYDROCHLORIDE 10 MG: 10 TABLET ORAL at 22:53

## 2025-05-31 RX ADMIN — GABAPENTIN 1200 MG: 400 CAPSULE ORAL at 07:22

## 2025-05-31 RX ADMIN — OXYCODONE HYDROCHLORIDE 10 MG: 10 TABLET ORAL at 09:09

## 2025-05-31 RX ADMIN — PANTOPRAZOLE SODIUM 40 MG: 40 TABLET, DELAYED RELEASE ORAL at 07:22

## 2025-05-31 RX ADMIN — HYDROMORPHONE HYDROCHLORIDE 1 MG: 1 INJECTION, SOLUTION INTRAMUSCULAR; INTRAVENOUS; SUBCUTANEOUS at 12:44

## 2025-05-31 RX ADMIN — ENOXAPARIN SODIUM 40 MG: 40 INJECTION SUBCUTANEOUS at 07:23

## 2025-05-31 RX ADMIN — SODIUM CHLORIDE: 0.9 INJECTION, SOLUTION INTRAVENOUS at 11:14

## 2025-05-31 RX ADMIN — OXYCODONE HYDROCHLORIDE 10 MG: 10 TABLET ORAL at 03:10

## 2025-05-31 RX ADMIN — NICOTINE POLACRILEX 2 MG: 2 GUM, CHEWING BUCCAL at 10:16

## 2025-05-31 RX ADMIN — SODIUM CHLORIDE AND POTASSIUM CHLORIDE: .9; .15 SOLUTION INTRAVENOUS at 01:23

## 2025-05-31 RX ADMIN — ACETAMINOPHEN 1000 MG: 500 TABLET ORAL at 11:14

## 2025-05-31 RX ADMIN — HYDROMORPHONE HYDROCHLORIDE 1 MG: 1 INJECTION, SOLUTION INTRAMUSCULAR; INTRAVENOUS; SUBCUTANEOUS at 01:29

## 2025-05-31 RX ADMIN — SODIUM CHLORIDE: 0.9 INJECTION, SOLUTION INTRAVENOUS at 08:56

## 2025-05-31 RX ADMIN — HYDROMORPHONE HYDROCHLORIDE 1 MG: 1 INJECTION, SOLUTION INTRAMUSCULAR; INTRAVENOUS; SUBCUTANEOUS at 20:35

## 2025-05-31 RX ADMIN — INSULIN ASPART 1 UNITS: 100 INJECTION, SOLUTION INTRAVENOUS; SUBCUTANEOUS at 12:40

## 2025-05-31 RX ADMIN — INSULIN ASPART 1 UNITS: 100 INJECTION, SOLUTION INTRAVENOUS; SUBCUTANEOUS at 20:42

## 2025-05-31 ASSESSMENT — ACTIVITIES OF DAILY LIVING (ADL)
ADLS_ACUITY_SCORE: 26

## 2025-05-31 NOTE — PROGRESS NOTES
[facility]    Hospitalist Progress Note             Date of Admission:  5/30/2025                   Day of hospitalization: 0    Assessment and Plan:   Ozzie Olmedo is a 44 year old male with history of DM, HTN, HLP, R testicular ca (seminoma) admitted on 5/30/2025 with acute pancreatitis due to alcohol.     Acute pancreatitis due to alcohol  -patient drank 1/2 liter of Fireball on Sat which is likely what triggered his pancreatitis  -CT shows mild edema around the head of the pancreas, lipase level okay  -The patient was placed on diet and has had continued abdominal pain will change him back to n.p.o., bolusing  1 L and  IV fluid continue fluids at 125 ml/hour    Acute kidney injury  -Likely volume depletion did not receive contrast yesterday, CT  abdomen pelvis no evidence of hydronephrosis  -Bladder scan okay, urine electrolytes ordered bolus 1 L of IV fluids continue maintain his at 125 mL/h  -Hold his lisinopril, hydrochlorothiazide, renally dosed his gabapentin     Hypomagnesemia    - replacement protocol     DM  -hold home metformin while NPO  -ISS  -last HbA1c was7.2 on 1/15/2025     HTN  -Hold his losartan and hydrochlorothiazide amlodipine while here     HLP  -Crestor on hold     Testicular Ca  Seminoma    - s/p surgery    - stable     Current tobacco use    - patient declines patch, would like gum    # Code status: Full Lovenox  # DVT: Lovenox monitor his GFR  # IVF: NS at 125 ml/hour        Medically Ready for Discharge: Anticipated in 2-4 Days                    Jennyfer Patterson MD    Subjective   Chief Complaint:  Abdominal pain  Subjective patient continues to have abdominal pain throughout the evening especially while eating meals.  He is requesting changes in his opiate therapy including adding hydroxyzine to his pain regimen  States he is having no issues with urination otherwise bowel movements okay     Objective   /79 (BP Location: Left arm)   Pulse 95   Temp 98.5  F  "(36.9  C) (Oral)   Resp 12   Ht 1.829 m (6' 0.01\")   Wt 118.8 kg (261 lb 14.5 oz)   SpO2 95%   BMI 35.51 kg/m       Physical Exam  General: Pt in NAD, normal appearance  HEENT: OP clear MMM, no JVD  Lungs: Clear to Auscultation Bilateral, normal breathing  without accessory muscle usage, no wheezing, rhonchi or crackles  Cardiac: +S1, S2, RRR, no MRG, no edema  Abdominal: normal bowel sounds, NT/ND  Skin: warm, dry, normal turgor, no rash  Psyche: A& O x3, appropriate affect             Intake/Output Summary (Last 24 hours) at 5/31/2025 1208  Last data filed at 5/31/2025 1153  Gross per 24 hour   Intake --   Output 450 ml   Net -450 ml           Labs and Imaging Results:      Recent Labs   Lab 05/31/25  0711 05/30/25  1045   WBC 9.0 13.7*   HGB 13.9 15.7    235        Recent Labs   Lab 05/31/25  0711 05/30/25  1045   * 134*   CO2 23 23   BUN 12.9 13.1      No results for input(s): \"INR\", \"PTT\" in the last 168 hours.   No results for input(s): \"CKMB\" in the last 168 hours.    Invalid input(s): \"TROPONINT\"     Recent Labs   Lab 05/30/25  1045   ALBUMIN 4.4   AST 23   ALT 42   ALKPHOS 94        Micro:     Radio:  CT Abdomen Pelvis w Contrast   Final Result   IMPRESSION:    1.  Mild inflammatory changes around the pancreatic head suggestive of acute interstitial edematous pancreatitis. No peripancreatic fluid collections.   2.  Hepatic steatosis.                 Medications:      Scheduled Meds:    Current Facility-Administered Medications   Medication Dose Route Frequency Provider Last Rate Last Admin    amLODIPine (NORVASC) tablet 5 mg  5 mg Oral Daily Jennyfer Patterson MD   5 mg at 05/31/25 0909    enoxaparin ANTICOAGULANT (LOVENOX) injection 40 mg  40 mg Subcutaneous Q24H Cholo Matute MD   40 mg at 05/31/25 0723    gabapentin (NEURONTIN) capsule 400 mg  400 mg Oral BID Jennyfer Patterson MD        hydrOXYzine HCl (ATARAX) tablet 50 mg  50 mg Oral At Bedtime Cholo Matute MD   50 mg at " 05/30/25 2129    insulin aspart (NovoLOG) injection (RAPID ACTING)  1-7 Units Subcutaneous Q4H Cholo Matute MD   1 Units at 05/31/25 0927    [Held by provider] losartan (COZAAR) tablet 50 mg  50 mg Oral Daily Cholo Matute MD   50 mg at 05/30/25 1409    [Held by provider] metFORMIN (GLUCOPHAGE XR) 24 hr tablet 2,000 mg  2,000 mg Oral At Bedtime Cholo Matute MD        pantoprazole (PROTONIX) EC tablet 40 mg  40 mg Oral Daily Cholo Matute MD   40 mg at 05/31/25 0722     Continuous Infusions:    Current Facility-Administered Medications   Medication Dose Route Frequency Provider Last Rate Last Admin    sodium chloride 0.9 % infusion   Intravenous Continuous Jennyfer Patterson  mL/hr at 05/31/25 1114 New Bag at 05/31/25 1114     PRN Meds:    Current Facility-Administered Medications   Medication Dose Route Frequency Provider Last Rate Last Admin    acetaminophen (TYLENOL) tablet 1,000 mg  1,000 mg Oral Q8H PRN Cholo Matute MD   1,000 mg at 05/31/25 1114    cyclobenzaprine (FLEXERIL) tablet 10 mg  10 mg Oral TID PRN Cholo Matute MD   10 mg at 05/31/25 1114    glucose gel 15-30 g  15-30 g Oral Q15 Min PRN Cholo Matute MD        Or    dextrose 50 % injection 25-50 mL  25-50 mL Intravenous Q15 Min PRN Cholo Matute MD        Or    glucagon injection 1 mg  1 mg Subcutaneous Q15 Min PRN Cholo Matute MD        HYDROmorphone (DILAUDID) injection 1 mg  1 mg Intravenous Q3H PRN Cholo Matute MD   1 mg at 05/31/25 0721    HYDROmorphone (PF) (DILAUDID) injection 0.5 mg  0.5 mg Intravenous Q3H PRN Cholo Matute MD        nicotine (NICORETTE) gum 2 mg  2 mg Buccal Q1H PRN Cholo Matute MD   2 mg at 05/31/25 1016    ondansetron (ZOFRAN ODT) ODT tab 4 mg  4 mg Oral Q6H PRN Cholo Matute MD        Or    ondansetron (ZOFRAN) injection 4 mg  4 mg Intravenous Q6H PRN Cholo Matute MD        oxyCODONE (ROXICODONE) tablet 5 mg  5 mg Oral Q4H PRCholo Taylor MD         oxyCODONE IR (ROXICODONE) tablet 10 mg  10 mg Oral Q6H PRCholo Taylor MD   10 mg at 05/31/25 0909    polyethylene glycol (MIRALAX) Packet 17 g  17 g Oral BID PRCholo Taylor MD        prochlorperazine (COMPAZINE) injection 10 mg  10 mg Intravenous Q6H PRCholo Taylor MD        Or    prochlorperazine (COMPAZINE) tablet 10 mg  10 mg Oral Q6H PRCholo Taylor MD        senna-docusate (SENOKOT-S/PERICOLACE) 8.6-50 MG per tablet 1 tablet  1 tablet Oral BID Cholo Schmidt MD        Or    senna-docusate (SENOKOT-S/PERICOLACE) 8.6-50 MG per tablet 2 tablet  2 tablet Oral BID Cholo Schmidt MD

## 2025-05-31 NOTE — PLAN OF CARE
"AOX4, RA. Given oxy, dilaudid for pain. Up ind in the room. Voding. fluid at 125 ml/hr. Achs checks. Mg protocol recheck in the morning. Full liquid diet.  /88 (BP Location: Left arm, Patient Position: Semi-Ruelas's, Cuff Size: Adult Regular)   Pulse 97   Temp 97.7  F (36.5  C) (Oral)   Resp 18   Ht 1.829 m (6' 0.01\")   Wt 118.8 kg (261 lb 14.5 oz)   SpO2 94%   BMI 35.51 kg/m        Goal Outcome Evaluation:      Plan of Care Reviewed With: patient    Overall Patient Progress: improvingOverall Patient Progress: improving    Problem: Adult Inpatient Plan of Care  Goal: Plan of Care Review  Description: The Plan of Care Review/Shift note should be completed every shift.  The Outcome Evaluation is a brief statement about your assessment that the patient is improving, declining, or no change.  This information will be displayed automatically on your shift  note.  Outcome: Progressing  Flowsheets (Taken 5/30/2025 2157)  Plan of Care Reviewed With: patient  Overall Patient Progress: improving  Goal: Patient-Specific Goal (Individualized)  Description: You can add care plan individualizations to a care plan. Examples of Individualization might be:  \"Parent requests to be called daily at 9am for status\", \"I have a hard time hearing out of my right ear\", or \"Do not touch me to wake me up as it startles  me\".  Outcome: Progressing  Goal: Absence of Hospital-Acquired Illness or Injury  Outcome: Progressing  Goal: Optimal Comfort and Wellbeing  Outcome: Progressing  Intervention: Monitor Pain and Promote Comfort  Recent Flowsheet Documentation  Taken 5/30/2025 2136 by Leanna Corbett, RN  Pain Management Interventions: medication (see MAR)  Goal: Readiness for Transition of Care  Outcome: Progressing     Problem: Pain Acute  Goal: Optimal Pain Control and Function  Outcome: Progressing  Intervention: Develop Pain Management Plan  Recent Flowsheet Documentation  Taken 5/30/2025 2136 by Leanna Corbett, " RN  Pain Management Interventions: medication (see MAR)

## 2025-05-31 NOTE — PLAN OF CARE
Goal Outcome Evaluation:      Plan of Care Reviewed With: patient    Overall Patient Progress: decliningOverall Patient Progress: declining    Outcome Evaluation: Pt A&O. MD notified d/t increased pain. Pt now NPO, denies N/V. , 141, 146, 158.  Pain managed with tylenol, flexeril, oxycodone, and IV dilaudid. Transfers ind. Receiving nicotine gum. AUO.        Problem: Adult Inpatient Plan of Care  Goal: Plan of Care Review  Description: The Plan of Care Review/Shift note should be completed every shift.  The Outcome Evaluation is a brief statement about your assessment that the patient is improving, declining, or no change.  This information will be displayed automatically on your shift  note.  Outcome: Progressing  Flowsheets (Taken 5/31/2025 1846)  Outcome Evaluation: Pt A&O. MD notified d/t increased pain. Pt now NPO, denies N/V. , 141, 146, 158.  Pain managed with tylenol, flexeril, oxycodone, and IV dilaudid. Transfers ind. Receiving nicotine gum. AUO.  Plan of Care Reviewed With: patient  Overall Patient Progress: declining  Goal: Absence of Hospital-Acquired Illness or Injury  Outcome: Progressing  Intervention: Identify and Manage Fall Risk  Recent Flowsheet Documentation  Taken 5/31/2025 0840 by Kiara Costello RN  Safety Promotion/Fall Prevention:   increased rounding and observation   clutter free environment maintained   increase visualization of patient   lighting adjusted   mobility aid in reach   nonskid shoes/slippers when out of bed   patient and family education   safety round/check completed  Intervention: Prevent Skin Injury  Recent Flowsheet Documentation  Taken 5/31/2025 0840 by Kiara Costello RN  Body Position: position changed independently  Taken 5/31/2025 0718 by Kiara Costello RN  Body Position: position changed independently  Goal: Optimal Comfort and Wellbeing  Outcome: Progressing  Intervention: Monitor Pain and Promote Comfort  Recent Flowsheet  Documentation  Taken 5/31/2025 1559 by Kiara Costello, RN  Pain Management Interventions:   pain management plan reviewed with patient/caregiver   cold applied  Goal: Readiness for Transition of Care  Outcome: Progressing     Problem: Pain Acute  Goal: Optimal Pain Control and Function  Outcome: Progressing  Intervention: Develop Pain Management Plan  Recent Flowsheet Documentation  Taken 5/31/2025 1559 by Kiara Costello, RN  Pain Management Interventions:   pain management plan reviewed with patient/caregiver   cold applied  Intervention: Prevent or Manage Pain  Recent Flowsheet Documentation  Taken 5/31/2025 0840 by Kiara Costello, RN  Medication Review/Management: medications reviewed

## 2025-06-01 LAB
ANION GAP SERPL CALCULATED.3IONS-SCNC: 11 MMOL/L (ref 7–15)
BUN SERPL-MCNC: 9.3 MG/DL (ref 6–20)
CALCIUM SERPL-MCNC: 9.1 MG/DL (ref 8.8–10.4)
CHLORIDE SERPL-SCNC: 96 MMOL/L (ref 98–107)
CREAT SERPL-MCNC: 0.9 MG/DL (ref 0.67–1.17)
EGFRCR SERPLBLD CKD-EPI 2021: >90 ML/MIN/1.73M2
GLUCOSE BLDC GLUCOMTR-MCNC: 145 MG/DL (ref 70–99)
GLUCOSE BLDC GLUCOMTR-MCNC: 162 MG/DL (ref 70–99)
GLUCOSE BLDC GLUCOMTR-MCNC: 164 MG/DL (ref 70–99)
GLUCOSE BLDC GLUCOMTR-MCNC: 179 MG/DL (ref 70–99)
GLUCOSE BLDC GLUCOMTR-MCNC: 180 MG/DL (ref 70–99)
GLUCOSE BLDC GLUCOMTR-MCNC: 184 MG/DL (ref 70–99)
GLUCOSE BLDC GLUCOMTR-MCNC: 203 MG/DL (ref 70–99)
GLUCOSE SERPL-MCNC: 144 MG/DL (ref 70–99)
HCO3 SERPL-SCNC: 28 MMOL/L (ref 22–29)
HOLD SPECIMEN: NORMAL
MAGNESIUM SERPL-MCNC: 1.4 MG/DL (ref 1.7–2.3)
MAGNESIUM SERPL-MCNC: 2.2 MG/DL (ref 1.7–2.3)
POTASSIUM SERPL-SCNC: 3.9 MMOL/L (ref 3.4–5.3)
SODIUM SERPL-SCNC: 135 MMOL/L (ref 135–145)

## 2025-06-01 PROCEDURE — 36415 COLL VENOUS BLD VENIPUNCTURE: CPT | Performed by: HOSPITALIST

## 2025-06-01 PROCEDURE — 99232 SBSQ HOSP IP/OBS MODERATE 35: CPT | Performed by: INTERNAL MEDICINE

## 2025-06-01 PROCEDURE — 258N000003 HC RX IP 258 OP 636: Performed by: HOSPITALIST

## 2025-06-01 PROCEDURE — 250N000013 HC RX MED GY IP 250 OP 250 PS 637: Performed by: HOSPITALIST

## 2025-06-01 PROCEDURE — 83735 ASSAY OF MAGNESIUM: CPT | Performed by: INTERNAL MEDICINE

## 2025-06-01 PROCEDURE — 250N000011 HC RX IP 250 OP 636: Performed by: INTERNAL MEDICINE

## 2025-06-01 PROCEDURE — 80048 BASIC METABOLIC PNL TOTAL CA: CPT | Performed by: HOSPITALIST

## 2025-06-01 PROCEDURE — 250N000011 HC RX IP 250 OP 636: Mod: JZ | Performed by: HOSPITALIST

## 2025-06-01 PROCEDURE — 250N000013 HC RX MED GY IP 250 OP 250 PS 637: Performed by: INTERNAL MEDICINE

## 2025-06-01 PROCEDURE — 120N000001 HC R&B MED SURG/OB

## 2025-06-01 PROCEDURE — 36415 COLL VENOUS BLD VENIPUNCTURE: CPT | Performed by: INTERNAL MEDICINE

## 2025-06-01 RX ORDER — NICOTINE POLACRILEX 4 MG
15-30 LOZENGE BUCCAL
Status: DISCONTINUED | OUTPATIENT
Start: 2025-06-01 | End: 2025-06-01

## 2025-06-01 RX ORDER — MAGNESIUM SULFATE HEPTAHYDRATE 40 MG/ML
4 INJECTION, SOLUTION INTRAVENOUS ONCE
Status: COMPLETED | OUTPATIENT
Start: 2025-06-01 | End: 2025-06-01

## 2025-06-01 RX ORDER — NICOTINE POLACRILEX 4 MG
15-30 LOZENGE BUCCAL
Status: DISCONTINUED | OUTPATIENT
Start: 2025-06-01 | End: 2025-06-02 | Stop reason: HOSPADM

## 2025-06-01 RX ORDER — DEXTROSE MONOHYDRATE 25 G/50ML
25-50 INJECTION, SOLUTION INTRAVENOUS
Status: DISCONTINUED | OUTPATIENT
Start: 2025-06-01 | End: 2025-06-01

## 2025-06-01 RX ORDER — NALOXONE HYDROCHLORIDE 0.4 MG/ML
0.4 INJECTION, SOLUTION INTRAMUSCULAR; INTRAVENOUS; SUBCUTANEOUS
Status: DISCONTINUED | OUTPATIENT
Start: 2025-06-01 | End: 2025-06-02 | Stop reason: HOSPADM

## 2025-06-01 RX ORDER — NALOXONE HYDROCHLORIDE 0.4 MG/ML
0.2 INJECTION, SOLUTION INTRAMUSCULAR; INTRAVENOUS; SUBCUTANEOUS
Status: DISCONTINUED | OUTPATIENT
Start: 2025-06-01 | End: 2025-06-02 | Stop reason: HOSPADM

## 2025-06-01 RX ORDER — HYDROMORPHONE HYDROCHLORIDE 1 MG/ML
0.5 INJECTION, SOLUTION INTRAMUSCULAR; INTRAVENOUS; SUBCUTANEOUS
Status: DISCONTINUED | OUTPATIENT
Start: 2025-06-01 | End: 2025-06-02 | Stop reason: HOSPADM

## 2025-06-01 RX ORDER — DEXTROSE MONOHYDRATE 25 G/50ML
25-50 INJECTION, SOLUTION INTRAVENOUS
Status: DISCONTINUED | OUTPATIENT
Start: 2025-06-01 | End: 2025-06-02 | Stop reason: HOSPADM

## 2025-06-01 RX ORDER — OXYCODONE HYDROCHLORIDE 10 MG/1
10 TABLET ORAL EVERY 4 HOURS PRN
Refills: 0 | Status: DISCONTINUED | OUTPATIENT
Start: 2025-06-01 | End: 2025-06-02 | Stop reason: HOSPADM

## 2025-06-01 RX ADMIN — NICOTINE POLACRILEX 2 MG: 2 GUM, CHEWING BUCCAL at 03:25

## 2025-06-01 RX ADMIN — NICOTINE POLACRILEX 2 MG: 2 GUM, CHEWING BUCCAL at 22:32

## 2025-06-01 RX ADMIN — INSULIN ASPART 1 UNITS: 100 INJECTION, SOLUTION INTRAVENOUS; SUBCUTANEOUS at 00:08

## 2025-06-01 RX ADMIN — GABAPENTIN 400 MG: 400 CAPSULE ORAL at 20:15

## 2025-06-01 RX ADMIN — CYCLOBENZAPRINE 10 MG: 10 TABLET, FILM COATED ORAL at 22:36

## 2025-06-01 RX ADMIN — PANTOPRAZOLE SODIUM 40 MG: 40 TABLET, DELAYED RELEASE ORAL at 08:18

## 2025-06-01 RX ADMIN — OXYCODONE HYDROCHLORIDE 10 MG: 10 TABLET ORAL at 05:32

## 2025-06-01 RX ADMIN — OXYCODONE HYDROCHLORIDE 10 MG: 10 TABLET ORAL at 20:13

## 2025-06-01 RX ADMIN — HYDROMORPHONE HYDROCHLORIDE 0.5 MG: 1 INJECTION, SOLUTION INTRAMUSCULAR; INTRAVENOUS; SUBCUTANEOUS at 01:32

## 2025-06-01 RX ADMIN — OXYCODONE HYDROCHLORIDE 10 MG: 10 TABLET ORAL at 11:16

## 2025-06-01 RX ADMIN — SODIUM CHLORIDE: 0.9 INJECTION, SOLUTION INTRAVENOUS at 21:50

## 2025-06-01 RX ADMIN — CYCLOBENZAPRINE 10 MG: 10 TABLET, FILM COATED ORAL at 12:59

## 2025-06-01 RX ADMIN — INSULIN ASPART 1 UNITS: 100 INJECTION, SOLUTION INTRAVENOUS; SUBCUTANEOUS at 03:32

## 2025-06-01 RX ADMIN — INSULIN ASPART 1 UNITS: 100 INJECTION, SOLUTION INTRAVENOUS; SUBCUTANEOUS at 08:19

## 2025-06-01 RX ADMIN — HYDROXYZINE HYDROCHLORIDE 50 MG: 50 TABLET, FILM COATED ORAL at 20:16

## 2025-06-01 RX ADMIN — SODIUM CHLORIDE: 0.9 INJECTION, SOLUTION INTRAVENOUS at 09:42

## 2025-06-01 RX ADMIN — ACETAMINOPHEN 1000 MG: 500 TABLET ORAL at 12:59

## 2025-06-01 RX ADMIN — AMLODIPINE BESYLATE 5 MG: 5 TABLET ORAL at 08:19

## 2025-06-01 RX ADMIN — ENOXAPARIN SODIUM 40 MG: 40 INJECTION SUBCUTANEOUS at 08:19

## 2025-06-01 RX ADMIN — MAGNESIUM SULFATE HEPTAHYDRATE 4 G: 40 INJECTION, SOLUTION INTRAVENOUS at 13:00

## 2025-06-01 RX ADMIN — HYDROMORPHONE HYDROCHLORIDE 0.5 MG: 1 INJECTION, SOLUTION INTRAMUSCULAR; INTRAVENOUS; SUBCUTANEOUS at 08:19

## 2025-06-01 RX ADMIN — GABAPENTIN 400 MG: 400 CAPSULE ORAL at 08:18

## 2025-06-01 ASSESSMENT — ACTIVITIES OF DAILY LIVING (ADL)
ADLS_ACUITY_SCORE: 26

## 2025-06-01 NOTE — PLAN OF CARE
"Provided cares [phone]    Goal Outcome Evaluation:      Plan of Care Reviewed With: patient    Overall Patient Progress: improvingOverall Patient Progress: improving    Outcome Evaluation: diet advanced to low fat, on RA, PRN medications given for pain, IV infusing NS @125ml/hr, ACHS BG's    Problem: Adult Inpatient Plan of Care  Goal: Plan of Care Review  Description: The Plan of Care Review/Shift note should be completed every shift.  The Outcome Evaluation is a brief statement about your assessment that the patient is improving, declining, or no change.  This information will be displayed automatically on your shift  note.  Outcome: Progressing  Flowsheets (Taken 6/1/2025 1603)  Outcome Evaluation: diet advanced to low fat, on RA, PRN medications given for pain, IV infusing NS @125ml/hr, ACHS BG's  Plan of Care Reviewed With: patient  Overall Patient Progress: improving  Goal: Patient-Specific Goal (Individualized)  Description: You can add care plan individualizations to a care plan. Examples of Individualization might be:  \"Parent requests to be called daily at 9am for status\", \"I have a hard time hearing out of my right ear\", or \"Do not touch me to wake me up as it startles  me\".  Outcome: Progressing  Goal: Absence of Hospital-Acquired Illness or Injury  Outcome: Progressing  Intervention: Identify and Manage Fall Risk  Recent Flowsheet Documentation  Taken 6/1/2025 0811 by Cara Jennings RN  Safety Promotion/Fall Prevention:   safety round/check completed   nonskid shoes/slippers when out of bed   increase visualization of patient  Intervention: Prevent Skin Injury  Recent Flowsheet Documentation  Taken 6/1/2025 0811 by Cara Jennings RN  Body Position: position changed independently  Intervention: Prevent Infection  Recent Flowsheet Documentation  Taken 6/1/2025 0811 by Cara Jennings, RN  Infection Prevention:   rest/sleep promoted   hand hygiene promoted  Goal: Optimal Comfort and Wellbeing  Outcome: " Progressing  Intervention: Monitor Pain and Promote Comfort  Recent Flowsheet Documentation  Taken 6/1/2025 1255 by Cara Jennings, RN  Pain Management Interventions: medication (see MAR)  Taken 6/1/2025 1116 by Cara Jennings, RN  Pain Management Interventions:   medication (see MAR)   rest  Taken 6/1/2025 0811 by Cara Jennings, RN  Pain Management Interventions:   medication (see MAR)   rest  Goal: Readiness for Transition of Care  Outcome: Progressing

## 2025-06-01 NOTE — PROGRESS NOTES
[facility]  Hospitalist Progress Note             Date of Admission:  5/30/2025                   Assessment and Plan:   Ozzie Olmedo is a 44 year old male with history of DM, HTN, HLP, R testicular ca (seminoma) admitted on 5/30/2025 with acute pancreatitis due to alcohol.     Acute pancreatitis due to alcohol  -patient drank 1/2 liter of Fireball on Sat which is likely what triggered his pancreatitis  -CT shows mild edema around the head of the pancreas, lipase level okay  -The patient was placed on diet yesterday and continued to have abdominal pain and was made n.p.o.   - Today patient felt better, pain is improving requests to try oral intake.  - Full liquid diet started, if he tolerates will advance to low-fat diet.  - Continue IV fluids.    ASHLEY  - Patient received contrast dye and also volume contracted due to Pooling related to pancreatitis .  - Has been hydrated, was given bolus of IV fluid as well.  - Creatinine was 1.8 yesterday, improved to 0.9 today.  - Continue to hold HCTZ, will resume Cozaar 50 mg daily.  - No electrolytes ordered bolus 1 L of IV fluids continue maintain his at 125 mL/h     Hypomagnesemia    - Replace magnesium per protocol.     Other chronic medical conditions  DM: Metformin on hold, started on sliding scale.  A1c was 7.2 at the beginning of the   insulin hold home metformin while NPO  HTN: Will resume Cozaar, amlodipine, continue to hold hydrochlorothiazide  HLP: Crestor on hold, will resume tomorrow   Testicular Ca/Seminoma: S/p surgery  Current tobacco use: Patient declines patch, would like gum     Code status: Full Lovenox   DVT: Lovenox monitor his GFR   IVF: NS at 125 ml/hour        Medically Ready for Discharge: Anticipated Tomorrow if continues to improve and tolerates oral intake    Total time: 35 minutes           Anirudh Steven MD    Subjective   Patient seen and examined, assumed care today, chart, medications, labs, overnight events  "reviewed.  He denied any nausea or vomiting, felt hungry and requested if he try some oral intake.  Ordered full liquid diet for the patient and will advance to low-fat if he tolerates it.  He also had back pain which is chronic for him and now no significant abdominal pain.     Objective   BP (!) 148/92   Pulse 98   Temp 98.2  F (36.8  C) (Oral)   Resp 14   Ht 1.829 m (6' 0.01\")   Wt 118.8 kg (261 lb 14.5 oz)   SpO2 98%   BMI 35.51 kg/m       Physical Exam  General: Awake and alert, pleasant and cooperative, not in distress.  HEENT: Pink, nonicteric, moist oral no JVD  CHEST: Clear to Auscultation Bilateral, normal breathing  without accessory muscle usage, no wheezing, rhonchi or crackles  CVS: +S1, S2, RRR, no MRG, no edema  ABD: normal bowel sounds, NT/ND  Skin: warm, dry, normal turgor, no rash  Psyche: A& O x3, appropriate affect         Labs and Imaging Results:      Recent Labs   Lab 05/31/25  0711 05/30/25  1045   WBC 9.0 13.7*   HGB 13.9 15.7    235        Recent Labs   Lab 06/01/25  0734 05/31/25  0711    130*   CO2 28 23   BUN 9.3 12.9      No results for input(s): \"INR\", \"PTT\" in the last 168 hours.   No results for input(s): \"CKMB\" in the last 168 hours.    Invalid input(s): \"TROPONINT\"     Recent Labs   Lab 05/30/25  1045   ALBUMIN 4.4   AST 23   ALT 42   ALKPHOS 94        Micro:     Radio:  CT Abdomen Pelvis w Contrast   Final Result   IMPRESSION:    1.  Mild inflammatory changes around the pancreatic head suggestive of acute interstitial edematous pancreatitis. No peripancreatic fluid collections.   2.  Hepatic steatosis.                 Medications:      Scheduled Meds:    Current Facility-Administered Medications   Medication Dose Route Frequency Provider Last Rate Last Admin    amLODIPine (NORVASC) tablet 5 mg  5 mg Oral Daily Jennyfer Patterson MD   5 mg at 06/01/25 0819    enoxaparin ANTICOAGULANT (LOVENOX) injection 40 mg  40 mg Subcutaneous Q24H Cholo Matute MD   40 " mg at 06/01/25 0819    gabapentin (NEURONTIN) capsule 400 mg  400 mg Oral BID Jennyfer Patterson MD   400 mg at 06/01/25 0818    hydrOXYzine HCl (ATARAX) tablet 50 mg  50 mg Oral At Bedtime Cholo Matute MD   50 mg at 05/31/25 2030    insulin aspart (NovoLOG) injection (RAPID ACTING)  1-7 Units Subcutaneous Q4H Cholo Matute MD   1 Units at 06/01/25 0819    [Held by provider] losartan (COZAAR) tablet 50 mg  50 mg Oral Daily Cholo Matute MD   50 mg at 05/30/25 1409    magnesium sulfate 4 g in 100 mL sterile water intermittent infusion  4 g Intravenous Once Anirudh Steven MD        [Held by provider] metFORMIN (GLUCOPHAGE XR) 24 hr tablet 2,000 mg  2,000 mg Oral At Bedtime Cholo Matute MD        pantoprazole (PROTONIX) EC tablet 40 mg  40 mg Oral Daily Cholo Matute MD   40 mg at 06/01/25 0818     Continuous Infusions:    Current Facility-Administered Medications   Medication Dose Route Frequency Provider Last Rate Last Admin    sodium chloride 0.9 % infusion   Intravenous Continuous Jennyfer Patterson  mL/hr at 06/01/25 0942 New Bag at 06/01/25 0942     PRN Meds:    Current Facility-Administered Medications   Medication Dose Route Frequency Provider Last Rate Last Admin    acetaminophen (TYLENOL) tablet 1,000 mg  1,000 mg Oral Q8H PRN Cholo Matute MD   1,000 mg at 05/31/25 1114    cyclobenzaprine (FLEXERIL) tablet 10 mg  10 mg Oral TID PRN Cholo Matute MD   10 mg at 05/31/25 1114    glucose gel 15-30 g  15-30 g Oral Q15 Min PRN Cholo Matute MD        Or    dextrose 50 % injection 25-50 mL  25-50 mL Intravenous Q15 Min PRN Cholo Matute MD        Or    glucagon injection 1 mg  1 mg Subcutaneous Q15 Min PRN Cholo Matute MD        HYDROmorphone (DILAUDID) injection 1 mg  1 mg Intravenous Q3H PRN Cholo Matute MD   1 mg at 05/31/25 2035    HYDROmorphone (PF) (DILAUDID) injection 0.5 mg  0.5 mg Intravenous Q3H PRN Cholo Matute MD   0.5 mg at 06/01/25  0819    nicotine (NICORETTE) gum 2 mg  2 mg Buccal Q1H PRN Cholo Matute MD   2 mg at 06/01/25 0325    ondansetron (ZOFRAN ODT) ODT tab 4 mg  4 mg Oral Q6H PRN Cholo Matute MD        Or    ondansetron (ZOFRAN) injection 4 mg  4 mg Intravenous Q6H PRN Cholo Matute MD        oxyCODONE (ROXICODONE) tablet 5 mg  5 mg Oral Q4H PRN Cholo Matute MD        oxyCODONE IR (ROXICODONE) tablet 10 mg  10 mg Oral Q6H PRN Cholo Matute MD   10 mg at 06/01/25 1116    polyethylene glycol (MIRALAX) Packet 17 g  17 g Oral BID PRCholo Taylor MD        prochlorperazine (COMPAZINE) injection 10 mg  10 mg Intravenous Q6H PRN Cholo Matute MD        Or    prochlorperazine (COMPAZINE) tablet 10 mg  10 mg Oral Q6H PRCholo Taylor MD        senna-docusate (SENOKOT-S/PERICOLACE) 8.6-50 MG per tablet 1 tablet  1 tablet Oral BID Cholo Schmidt MD        Or    senna-docusate (SENOKOT-S/PERICOLACE) 8.6-50 MG per tablet 2 tablet  2 tablet Oral BID Cholo Schmidt MD

## 2025-06-02 VITALS
OXYGEN SATURATION: 98 % | RESPIRATION RATE: 10 BRPM | HEIGHT: 72 IN | BODY MASS INDEX: 35.47 KG/M2 | WEIGHT: 261.91 LBS | DIASTOLIC BLOOD PRESSURE: 99 MMHG | SYSTOLIC BLOOD PRESSURE: 152 MMHG | TEMPERATURE: 98.4 F | HEART RATE: 87 BPM

## 2025-06-02 LAB
CREAT SERPL-MCNC: 0.91 MG/DL (ref 0.67–1.17)
EGFRCR SERPLBLD CKD-EPI 2021: >90 ML/MIN/1.73M2
GLUCOSE BLDC GLUCOMTR-MCNC: 150 MG/DL (ref 70–99)
GLUCOSE BLDC GLUCOMTR-MCNC: 177 MG/DL (ref 70–99)
MAGNESIUM SERPL-MCNC: 1.6 MG/DL (ref 1.7–2.3)
MCV RBC AUTO: 91 FL (ref 78–100)
PLATELET # BLD AUTO: 194 10E3/UL (ref 150–450)

## 2025-06-02 PROCEDURE — 36415 COLL VENOUS BLD VENIPUNCTURE: CPT | Performed by: HOSPITALIST

## 2025-06-02 PROCEDURE — 99238 HOSP IP/OBS DSCHRG MGMT 30/<: CPT | Performed by: INTERNAL MEDICINE

## 2025-06-02 PROCEDURE — 85049 AUTOMATED PLATELET COUNT: CPT | Performed by: HOSPITALIST

## 2025-06-02 PROCEDURE — 250N000013 HC RX MED GY IP 250 OP 250 PS 637: Performed by: INTERNAL MEDICINE

## 2025-06-02 PROCEDURE — 250N000013 HC RX MED GY IP 250 OP 250 PS 637: Performed by: HOSPITALIST

## 2025-06-02 PROCEDURE — 83735 ASSAY OF MAGNESIUM: CPT | Performed by: INTERNAL MEDICINE

## 2025-06-02 PROCEDURE — 82565 ASSAY OF CREATININE: CPT | Performed by: HOSPITALIST

## 2025-06-02 PROCEDURE — 250N000011 HC RX IP 250 OP 636: Performed by: HOSPITALIST

## 2025-06-02 RX ADMIN — ENOXAPARIN SODIUM 40 MG: 40 INJECTION SUBCUTANEOUS at 08:30

## 2025-06-02 RX ADMIN — AMLODIPINE BESYLATE 5 MG: 5 TABLET ORAL at 08:29

## 2025-06-02 RX ADMIN — OXYCODONE HYDROCHLORIDE 10 MG: 10 TABLET ORAL at 09:47

## 2025-06-02 RX ADMIN — ACETAMINOPHEN 1000 MG: 500 TABLET ORAL at 09:47

## 2025-06-02 RX ADMIN — OXYCODONE HYDROCHLORIDE 10 MG: 10 TABLET ORAL at 05:28

## 2025-06-02 RX ADMIN — PANTOPRAZOLE SODIUM 40 MG: 40 TABLET, DELAYED RELEASE ORAL at 08:29

## 2025-06-02 RX ADMIN — GABAPENTIN 400 MG: 400 CAPSULE ORAL at 08:29

## 2025-06-02 RX ADMIN — NICOTINE POLACRILEX 2 MG: 2 GUM, CHEWING BUCCAL at 08:29

## 2025-06-02 RX ADMIN — OXYCODONE HYDROCHLORIDE 10 MG: 10 TABLET ORAL at 00:09

## 2025-06-02 RX ADMIN — LOSARTAN POTASSIUM 50 MG: 50 TABLET, FILM COATED ORAL at 08:29

## 2025-06-02 ASSESSMENT — ACTIVITIES OF DAILY LIVING (ADL)
ADLS_ACUITY_SCORE: 26

## 2025-06-02 NOTE — DISCHARGE SUMMARY
"[facility]  Hospitalist Discharge Summary      Date of Admission:  5/30/2025  Date of Discharge:  6/2/2025  Discharging Provider: Anirudh Steven MD  Discharge Service: Hospitalist Service    Discharge Diagnoses   Please see hospital course below.    Clinically Significant Risk Factors     # Obesity: Estimated body mass index is 35.51 kg/m  as calculated from the following:    Height as of this encounter: 1.829 m (6' 0.01\").    Weight as of this encounter: 118.8 kg (261 lb 14.5 oz).       Follow-ups Needed After Discharge   Follow-up Appointments       Hospital Follow-up with Existing Primary Care Provider (PCP)          Schedule Primary Care visit within: 30 Days                 Discharge Disposition   Discharged to home  Condition at discharge: Stable    Hospital Course   Ozzie Olmedo is a 44 year old male with history of DM, HTN, HLP, R testicular ca (seminoma) admitted on 5/30/2025 with acute pancreatitis due to alcohol.     Acute pancreatitis due to alcohol  Patient presented with epigastric abdominal pain, suspected to be due to alcohol induced acute pancreatitis.  He drank 1/2 liter of Fireball on Sat which is likely what triggered his pancreatitis. CT shows mild edema around the head of the pancreas, lipase level remained ok.  On admission he was n.p.o., restarted on diet and felt nausea and worsening pain and placed back on n.p.o. on 5/31.  He was treated with IV fluid aggressively.  His pain improved and, started on liquid diet, diet advanced to low-fat patient tolerated well and being discharged home in relatively stable condition .  rates will advance to low-fat diet.  - Continue IV fluids.     ASHLEY  - Patient received contrast dye and also volume contracted due to Pooling related to pancreatitis and was hydrated with IV fluids,.  -His creatinine improved, initially HCTZ was on hold and also restarted at the time of discharge.  -Overall he did well, remained stable and renal " function at baseline .     Hypomagnesemia: Replace magnesium per protocol.    Consultations This Hospital Stay   None    Code Status   Full Code    Time Spent on this Encounter   I, Anirudh Steven MD, personally saw the patient today and spent less than or equal to 30 minutes discharging this patient.       Anirudh Steven MD  [facility]  [address]  [facility] [address]  Phone: [phone]  Fax: [phone]  ______________________________________________________________________    Physical Exam   Vital Signs: Temp: 98.2  F (36.8  C) Temp src: Oral BP: (!) 131/93 Pulse: 86   Resp: 18 SpO2: 95 % O2 Device: None (Room air)    Weight: 261 lbs 14.5 oz  General: Awake and alert, pleasant and cooperative, not in distress.  HEENT: Pink, nonicteric, moist oral no JVD  CHEST: Clear to Auscultation Bilateral, normal breathing  without accessory muscle usage, no wheezing, rhonchi or crackles  CVS: +S1, S2, RRR, no MRG, no edema  ABD: normal bowel sounds, NT/ND  Skin: warm, dry, normal turgor, no rash  Psyche: A& O x3, appropriate affect       Primary Care Physician   Bridget Lange    Discharge Orders      Reason for your hospital stay    Acute pancreatitis, suspected alcohol related     Activity    Your activity upon discharge: activity as tolerated     Diet    Follow this diet upon discharge: Current Diet:Orders Placed This Encounter      Combination Diet; Low Fat Diet (up to 50g)     Hospital Follow-up with Existing Primary Care Provider (PCP)            Significant Results and Procedures   Most Recent 3 CBC's:  Recent Labs   Lab Test 06/02/25  0710 05/31/25  0711 05/30/25  1045 02/04/25  0721   WBC  --  9.0 13.7* 9.1   HGB  --  13.9 15.7 14.1   MCV 91 90 87 91    192 235 195     Most Recent 3 BMP's:  Recent Labs   Lab Test 06/02/25  0802 06/02/25  0710 06/02/25  0312 06/01/25  2122 06/01/25  0759 06/01/25  0734 05/31/25  0751 05/31/25  0711 05/30/25  1639  05/30/25  1045   NA  --   --   --   --   --  135  --  130*  --  134*   POTASSIUM  --   --   --   --   --  3.9  --  4.0  --  3.5   CHLORIDE  --   --   --   --   --  96*  --  92*  --  89*   CO2  --   --   --   --   --  28  --  23  --  23   BUN  --   --   --   --   --  9.3  --  12.9  --  13.1   CR  --  0.91  --   --   --  0.90  --  1.82*  --  0.95   ANIONGAP  --   --   --   --   --  11  --  15  --  22*   SYLVESTER  --   --   --   --   --  9.1  --  8.8  --  9.1   *  --  150* 180*   < > 144*   < > 163*   < > 215*    < > = values in this interval not displayed.     Most Recent 2 LFT's:  Recent Labs   Lab Test 05/30/25  1045 09/28/24  0912   AST 23 32   ALT 42 31   ALKPHOS 94 86   BILITOTAL 0.8 0.5   ,   Results for orders placed or performed during the hospital encounter of 05/30/25   CT Abdomen Pelvis w Contrast    Narrative    EXAM: CT ABDOMEN PELVIS W CONTRAST  LOCATION: [facility]  DATE: 5/30/2025    INDICATION: abd pain  COMPARISON: 2/4/2025  TECHNIQUE: CT scan of the abdomen and pelvis was performed following injection of IV contrast. Multiplanar reformats were obtained. Dose reduction techniques were used.  CONTRAST: 100mL Isovue 370    FINDINGS:   LOWER CHEST: Trace fluid in the visualized distal esophagus.    HEPATOBILIARY: Hepatic steatosis. No calcified gallstones or biliary ductal dilatation.    PANCREAS: Mild inflammatory changes around the pancreatic head. No pancreatic duct dilatation or peripancreatic fluid collections.    SPLEEN: Normal.    ADRENAL GLANDS: Normal.    KIDNEYS/BLADDER: Normal.    BOWEL: No small bowel or colonic obstruction, or inflammatory changes. Normal appendix.    LYMPH NODES: No lymphadenopathy.    VASCULATURE: Normal.    PELVIC ORGANS: No pelvic masses.    MUSCULOSKELETAL: No suspicious lesions in the bones. Bilateral L5 pars defects with mild anterolisthesis of L5 on S1.      Impression    IMPRESSION:   1.  Mild inflammatory changes around the pancreatic head  suggestive of acute interstitial edematous pancreatitis. No peripancreatic fluid collections.  2.  Hepatic steatosis.         Discharge Medications   Current Discharge Medication List        CONTINUE these medications which have NOT CHANGED    Details   acetaminophen (TYLENOL) 500 MG tablet Take 1,000 mg by mouth 4 times daily.      albuterol (PROAIR HFA/PROVENTIL HFA/VENTOLIN HFA) 108 (90 Base) MCG/ACT inhaler Inhale 1-2 puffs into the lungs every 6 hours as needed for shortness of breath / dyspnea or wheezing    Comments: Pharmacy may dispense brand covered by insurance (Proair, or proventil or ventolin or generic albuterol inhaler)      celecoxib (CELEBREX) 100 MG capsule Take 100 mg by mouth 2 times daily.      cyclobenzaprine (FLEXERIL) 10 MG tablet Take 10 mg by mouth 3 times daily as needed for muscle spasms.      gabapentin (NEURONTIN) 300 MG capsule Take 1,200 mg by mouth 3 times daily      HUMALOG KWIKPEN 100 UNIT/ML soln Inject subcutaneously 3 times daily (before meals). SLIDING SCALE   <150: 0 UNITS,  150-200: 2 UNITS,   201-250: 4 UNITS,  >250: 6 UNITS.   MAX DOSE OF 20UNITS DAILY      hydrochlorothiazide (MICROZIDE) 12.5 MG capsule Take 12.5 mg by mouth at bedtime.      hydrOXYzine HCl (ATARAX) 25 MG tablet Take 25-50 mg by mouth at bedtime.      losartan (COZAAR) 50 MG tablet Take 50 mg by mouth at bedtime.      metFORMIN (GLUCOPHAGE-XR) 500 MG 24 hr tablet Take 2,000 mg by mouth at bedtime.      oxyCODONE (ROXICODONE) 5 MG tablet Take 1-2 tablets (5-10 mg) by mouth every 4 hours as needed for moderate to severe pain  Qty: 20 tablet, Refills: 0    Associated Diagnoses: Postoperative state      pantoprazole (PROTONIX) 40 MG EC tablet Take 1 tablet (40 mg) by mouth daily.  Qty: 30 tablet, Refills: 0    Associated Diagnoses: Alcohol-induced acute pancreatitis without infection or necrosis      rosuvastatin (CRESTOR) 40 MG tablet Take 40 mg by mouth at bedtime.      tiZANidine (ZANAFLEX) 2 MG tablet Take  4-6 mg by mouth at bedtime. And left shoulder      triamcinolone (KENALOG) 0.1 % external cream Apply topically 2 times daily as needed for irritation.           Allergies   Allergies   Allergen Reactions    No Known Drug Allergy     Other (Do Not Use) Blisters     Hospital socks     Semaglutide(0.25 Or 0.5mg-Dos) Other (See Comments)     Caused Pancreatitis

## 2025-06-02 NOTE — PLAN OF CARE
"AOX4, RA. Given PRN po oxy x2, Flexeril, nicotine gum. Up ind, pt took few walks in the hospital, voiding. ACHS checks. Mg protocol, recheck this morning.  BP (!) 131/96 (BP Location: Right arm)   Pulse 68   Temp 97.9  F (36.6  C) (Oral)   Resp 16   Ht 1.829 m (6' 0.01\")   Wt 118.8 kg (261 lb 14.5 oz)   SpO2 97%   BMI 35.51 kg/m      Goal Outcome Evaluation:      Plan of Care Reviewed With: patient    Overall Patient Progress: improvingOverall Patient Progress: improving    Problem: Adult Inpatient Plan of Care  Goal: Plan of Care Review  Description: The Plan of Care Review/Shift note should be completed every shift.  The Outcome Evaluation is a brief statement about your assessment that the patient is improving, declining, or no change.  This information will be displayed automatically on your shift  note.  Outcome: Progressing  Flowsheets (Taken 6/2/2025 0454)  Plan of Care Reviewed With: patient  Overall Patient Progress: improving  Goal: Patient-Specific Goal (Individualized)  Description: You can add care plan individualizations to a care plan. Examples of Individualization might be:  \"Parent requests to be called daily at 9am for status\", \"I have a hard time hearing out of my right ear\", or \"Do not touch me to wake me up as it startles  me\".  Outcome: Progressing  Goal: Absence of Hospital-Acquired Illness or Injury  Outcome: Progressing  Intervention: Identify and Manage Fall Risk  Recent Flowsheet Documentation  Taken 6/1/2025 2018 by Leanna Corbett RN  Safety Promotion/Fall Prevention: safety round/check completed  Intervention: Prevent Infection  Recent Flowsheet Documentation  Taken 6/1/2025 2018 by Leanna Corbett, RN  Infection Prevention:   rest/sleep promoted   hand hygiene promoted  Goal: Optimal Comfort and Wellbeing  Outcome: Progressing  Intervention: Monitor Pain and Promote Comfort  Recent Flowsheet Documentation  Taken 6/1/2025 2018 by Leanna Corbett, RN  Pain Management " Interventions: medication (see MAR)  Goal: Readiness for Transition of Care  Outcome: Progressing     Problem: Pain Acute  Goal: Optimal Pain Control and Function  Outcome: Progressing  Intervention: Develop Pain Management Plan  Recent Flowsheet Documentation  Taken 6/1/2025 2018 by Leanna Corbett, RN  Pain Management Interventions: medication (see MAR)  Intervention: Prevent or Manage Pain  Recent Flowsheet Documentation  Taken 6/1/2025 2018 by Leanna Corbett, RN  Medication Review/Management: medications reviewed

## 2025-06-02 NOTE — PLAN OF CARE
"Pt is A&Ox4, pleasant. tolerating diet well, no nausea. c/o upper & lower back pain that radiates to abdomen at times, oxycodone and tylenol given with good relief. IVF stopped. on Mag protocol. per MD at bedside, pt is okay to discharge this afternoon. Independent in room.    10:30: Discharge order in, discharge vitals obtained. IV removed. AVS reviewed with pt, no new medications. Pt declined W/C, ambulated off unit.    Problem: Adult Inpatient Plan of Care  Goal: Plan of Care Review  Description: The Plan of Care Review/Shift note should be completed every shift.  The Outcome Evaluation is a brief statement about your assessment that the patient is improving, declining, or no change.  This information will be displayed automatically on your shift  note.  Outcome: Progressing  Flowsheets (Taken 6/2/2025 1027)  Outcome Evaluation: pt A&Ox4, pleasant. tolerating diet well, no nausea. c/o upper & lower back pain that radiates to abd at times, oxycodone and tylenol given with good relief. IVF stopped. on Mag protocol. per MD at bedside, pt is okay to discharge today.  Plan of Care Reviewed With: patient  Overall Patient Progress: improving  Goal: Patient-Specific Goal (Individualized)  Description: You can add care plan individualizations to a care plan. Examples of Individualization might be:  \"Parent requests to be called daily at 9am for status\", \"I have a hard time hearing out of my right ear\", or \"Do not touch me to wake me up as it startles  me\".  Outcome: Progressing  Goal: Absence of Hospital-Acquired Illness or Injury  Outcome: Progressing  Intervention: Identify and Manage Fall Risk  Recent Flowsheet Documentation  Taken 6/2/2025 0836 by Elle Rodriguez, RN  Safety Promotion/Fall Prevention: safety round/check completed  Intervention: Prevent Skin Injury  Recent Flowsheet Documentation  Taken 6/2/2025 0836 by Elle Rodriguez, RN  Body Position: position changed independently  Skin Protection: adhesive use " limited  Intervention: Prevent and Manage VTE (Venous Thromboembolism) Risk  Recent Flowsheet Documentation  Taken 6/2/2025 0836 by Elle Rodriguez RN  VTE Prevention/Management:   SCDs off (sequential compression devices)   patient refused intervention  Intervention: Prevent Infection  Recent Flowsheet Documentation  Taken 6/2/2025 0836 by Elle Rodriguez RN  Infection Prevention:   rest/sleep promoted   hand hygiene promoted   single patient room provided  Goal: Optimal Comfort and Wellbeing  Outcome: Progressing  Intervention: Monitor Pain and Promote Comfort  Recent Flowsheet Documentation  Taken 6/2/2025 1019 by Elle Rodriguez RN  Pain Management Interventions:   rest   repositioned   relaxation techniques promoted   quiet environment facilitated  Taken 6/2/2025 0836 by Elle Rodriguez RN  Pain Management Interventions: medication (see MAR)  Goal: Readiness for Transition of Care  Outcome: Progressing     Problem: Pain Acute  Goal: Optimal Pain Control and Function  Outcome: Progressing  Intervention: Develop Pain Management Plan  Recent Flowsheet Documentation  Taken 6/2/2025 1019 by Elle Rodriguez RN  Pain Management Interventions:   rest   repositioned   relaxation techniques promoted   quiet environment facilitated  Taken 6/2/2025 0836 by Elle Rodriguez RN  Pain Management Interventions: medication (see MAR)  Intervention: Prevent or Manage Pain  Recent Flowsheet Documentation  Taken 6/2/2025 0836 by Elle Rodriguez RN  Sensory Stimulation Regulation:   care clustered   quiet environment promoted  Bowel Elimination Promotion:   adequate fluid intake promoted   ambulation promoted  Medication Review/Management: medications reviewed     Problem: Adult Inpatient Plan of Care  Goal: Plan of Care Review  Description: The Plan of Care Review/Shift note should be completed every shift.  The Outcome Evaluation is a brief statement about your assessment that the patient is improving, declining, or no change.  This information  will be displayed automatically on your shift  note.  Outcome: Progressing  Flowsheets (Taken 6/2/2025 1027)  Outcome Evaluation: pt A&Ox4, pleasant. tolerating diet well, no nausea. c/o upper & lower back pain that radiates to abd at times, oxycodone and tylenol given with good relief. IVF stopped. on Mag protocol. per MD at bedside, pt is okay to discharge today.  Plan of Care Reviewed With: patient  Overall Patient Progress: improving

## 2025-06-14 ENCOUNTER — HEALTH MAINTENANCE LETTER (OUTPATIENT)
Age: 45
End: 2025-06-14

## (undated) DEVICE — DRSG KERLIX FLUFFS X5

## (undated) DEVICE — KIT ENDO TURNOVER/PROCEDURE W/CLEAN A SCOPE LINERS 103888

## (undated) DEVICE — LINEN TOWEL PACK X10 5473

## (undated) DEVICE — SU VICRYL 3-0 PS-1 18" UND J683

## (undated) DEVICE — BLADE KNIFE SURG 15 371115

## (undated) DEVICE — SOL NACL 0.9% IRRIG 1000ML BOTTLE 2F7124

## (undated) DEVICE — SYR 10ML FINGER CONTROL W/O NDL 309695

## (undated) DEVICE — GLOVE PROTEXIS POWDER FREE 8.0 ORTHOPEDIC 2D73ET80

## (undated) DEVICE — GLOVE BIOGEL PI MICRO SZ 7.0 48570

## (undated) DEVICE — DRAIN PENROSE 0.50"X18" LATEX FREE GR203

## (undated) DEVICE — GLOVE BIOGEL PI MICRO INDICATOR UNDERGLOVE SZ 7.0 48970

## (undated) DEVICE — SUCTION TIP YANKAUER W/O VENT K86

## (undated) DEVICE — TUBING SUCTION 12"X1/4" N612

## (undated) DEVICE — ESU GROUND PAD ADULT W/CORD E7507

## (undated) DEVICE — LINEN TOWEL PACK X5 5464

## (undated) DEVICE — LINEN DRAPE 54X72" 5467

## (undated) DEVICE — SYR BULB IRRIG DOVER 60 ML LATEX FREE 67000

## (undated) DEVICE — LINEN FULL SHEET 5511

## (undated) DEVICE — PANTIES MESH LG/XLG 2PK 706M2

## (undated) DEVICE — LINEN HALF SHEET 5512

## (undated) DEVICE — LIGHT HANDLE X2

## (undated) DEVICE — SPONGE BALL KERLIX ROUND XL W/O STRING LATEX 4935

## (undated) DEVICE — PACK MINOR CUSTOM RIDGES SBA32RMRMA

## (undated) DEVICE — Device

## (undated) DEVICE — PACK HAND WRIST SOP15HWFSP

## (undated) DEVICE — GLOVE PROTEXIS BLUE W/NEU-THERA 6.5  2D73EB65

## (undated) DEVICE — SU CHROMIC 3-0 SH 27" G122H

## (undated) DEVICE — PREP POVIDONE IODINE SOLUTION 10% 4OZ BOTTLE 29906-004

## (undated) DEVICE — GLOVE PROTEXIS POWDER FREE 6.5 ORTHOPEDIC 2D73ET65

## (undated) DEVICE — DRAPE LAP PEDS DISP 29492

## (undated) DEVICE — SU VICRYL 3-0 SH 27" J316H

## (undated) DEVICE — SU ETHILON 4-0 PC-3 18" 1864G

## (undated) DEVICE — SU ETHILON 2-0 PS 18" 585H

## (undated) DEVICE — GLOVE PROTEXIS W/NEU-THERA 8.0  2D73TE80

## (undated) DEVICE — TRAY PREP DRY SKIN SCRUB 067

## (undated) DEVICE — BAG CLEAR TRASH 1.3M 39X33" P4040C

## (undated) DEVICE — SOL WATER IRRIG 1000ML BOTTLE 2F7114

## (undated) DEVICE — PREP POVIDONE-IODINE 7.5% SCRUB 4OZ BOTTLE MDS093945

## (undated) DEVICE — NDL 25GA 1.5" 305127

## (undated) RX ORDER — LIDOCAINE HYDROCHLORIDE 20 MG/ML
INJECTION, SOLUTION EPIDURAL; INFILTRATION; INTRACAUDAL; PERINEURAL
Status: DISPENSED
Start: 2019-07-11

## (undated) RX ORDER — ONDANSETRON 2 MG/ML
INJECTION INTRAMUSCULAR; INTRAVENOUS
Status: DISPENSED
Start: 2019-07-11

## (undated) RX ORDER — KETOROLAC TROMETHAMINE 30 MG/ML
INJECTION, SOLUTION INTRAMUSCULAR; INTRAVENOUS
Status: DISPENSED
Start: 2025-02-04

## (undated) RX ORDER — FENTANYL CITRATE 50 UG/ML
INJECTION, SOLUTION INTRAMUSCULAR; INTRAVENOUS
Status: DISPENSED
Start: 2021-03-20

## (undated) RX ORDER — IPRATROPIUM BROMIDE AND ALBUTEROL SULFATE 2.5; .5 MG/3ML; MG/3ML
SOLUTION RESPIRATORY (INHALATION)
Status: DISPENSED
Start: 2025-02-04

## (undated) RX ORDER — CEFAZOLIN SODIUM IN 0.9 % NACL 3 G/100 ML
INTRAVENOUS SOLUTION, PIGGYBACK (ML) INTRAVENOUS
Status: DISPENSED
Start: 2019-07-11

## (undated) RX ORDER — FENTANYL CITRATE 50 UG/ML
INJECTION, SOLUTION INTRAMUSCULAR; INTRAVENOUS
Status: DISPENSED
Start: 2025-02-04

## (undated) RX ORDER — PROPOFOL 10 MG/ML
INJECTION, EMULSION INTRAVENOUS
Status: DISPENSED
Start: 2025-02-04

## (undated) RX ORDER — PROPOFOL 10 MG/ML
INJECTION, EMULSION INTRAVENOUS
Status: DISPENSED
Start: 2019-07-11

## (undated) RX ORDER — HYDROMORPHONE HCL IN WATER/PF 6 MG/30 ML
PATIENT CONTROLLED ANALGESIA SYRINGE INTRAVENOUS
Status: DISPENSED
Start: 2025-02-04

## (undated) RX ORDER — ONDANSETRON 2 MG/ML
INJECTION INTRAMUSCULAR; INTRAVENOUS
Status: DISPENSED
Start: 2025-02-04

## (undated) RX ORDER — BUPIVACAINE HYDROCHLORIDE 2.5 MG/ML
INJECTION, SOLUTION EPIDURAL; INFILTRATION; INTRACAUDAL
Status: DISPENSED
Start: 2025-02-04

## (undated) RX ORDER — ACETAMINOPHEN 325 MG/1
TABLET ORAL
Status: DISPENSED
Start: 2025-02-04

## (undated) RX ORDER — FENTANYL CITRATE 50 UG/ML
INJECTION, SOLUTION INTRAMUSCULAR; INTRAVENOUS
Status: DISPENSED
Start: 2019-07-11

## (undated) RX ORDER — GINSENG 100 MG
CAPSULE ORAL
Status: DISPENSED
Start: 2025-02-04

## (undated) RX ORDER — DEXAMETHASONE SODIUM PHOSPHATE 4 MG/ML
INJECTION, SOLUTION INTRA-ARTICULAR; INTRALESIONAL; INTRAMUSCULAR; INTRAVENOUS; SOFT TISSUE
Status: DISPENSED
Start: 2025-02-04

## (undated) RX ORDER — LIDOCAINE HYDROCHLORIDE 10 MG/ML
INJECTION, SOLUTION EPIDURAL; INFILTRATION; INTRACAUDAL; PERINEURAL
Status: DISPENSED
Start: 2025-02-04

## (undated) RX ORDER — LABETALOL HYDROCHLORIDE 5 MG/ML
INJECTION, SOLUTION INTRAVENOUS
Status: DISPENSED
Start: 2025-02-04

## (undated) RX ORDER — CEFAZOLIN SODIUM/WATER 3 G/30 ML
SYRINGE (ML) INTRAVENOUS
Status: DISPENSED
Start: 2025-02-04

## (undated) RX ORDER — FENTANYL CITRATE-0.9 % NACL/PF 10 MCG/ML
PLASTIC BAG, INJECTION (ML) INTRAVENOUS
Status: DISPENSED
Start: 2025-02-04